# Patient Record
Sex: FEMALE | Race: ASIAN | Employment: OTHER | URBAN - METROPOLITAN AREA
[De-identification: names, ages, dates, MRNs, and addresses within clinical notes are randomized per-mention and may not be internally consistent; named-entity substitution may affect disease eponyms.]

---

## 2017-01-09 DIAGNOSIS — R51.9 PERSISTENT HEADACHES: ICD-10-CM

## 2017-01-18 ENCOUNTER — DOCUMENTATION ONLY (OUTPATIENT)
Dept: FAMILY MEDICINE CLINIC | Age: 67
End: 2017-01-18

## 2017-01-18 ENCOUNTER — OFFICE VISIT (OUTPATIENT)
Dept: FAMILY MEDICINE CLINIC | Age: 67
End: 2017-01-18

## 2017-01-18 VITALS
HEART RATE: 88 BPM | BODY MASS INDEX: 26.88 KG/M2 | WEIGHT: 142.4 LBS | SYSTOLIC BLOOD PRESSURE: 130 MMHG | DIASTOLIC BLOOD PRESSURE: 72 MMHG | HEIGHT: 61 IN | OXYGEN SATURATION: 96 % | TEMPERATURE: 98.3 F

## 2017-01-18 DIAGNOSIS — Z13.1 SCREENING FOR DIABETES MELLITUS: ICD-10-CM

## 2017-01-18 DIAGNOSIS — Z13.6 SCREENING FOR ISCHEMIC HEART DISEASE: ICD-10-CM

## 2017-01-18 DIAGNOSIS — Z23 ENCOUNTER FOR IMMUNIZATION: ICD-10-CM

## 2017-01-18 DIAGNOSIS — Z00.00 ROUTINE GENERAL MEDICAL EXAMINATION AT A HEALTH CARE FACILITY: Primary | ICD-10-CM

## 2017-01-18 DIAGNOSIS — M54.2 NECK PAIN, MUSCULOSKELETAL: ICD-10-CM

## 2017-01-18 DIAGNOSIS — Z13.39 SCREENING FOR ALCOHOLISM: ICD-10-CM

## 2017-01-18 NOTE — PATIENT INSTRUCTIONS
Medicare Part B Preventive Services Limitations Recommendation Scheduled   Bone Mass Measurement  (age 72 & older, biennial) Requires diagnosis related to osteoporosis or estrogen deficiency. Biennial benefit unless patient has history of long-term glucocorticoid tx or baseline is needed because initial test was by other method     Cardiovascular Screening Blood Tests (every 5 years)  Total cholesterol, HDL, Triglycerides Order as a panel if possible     Colorectal Cancer Screening  -Fecal occult blood test (annual)  -Flexible sigmoidoscopy (5y)  -Screening colonoscopy (10y)  -Barium Enema      Counseling to Prevent Tobacco Use (up to 8 sessions per year)  - Counseling greater than 3 and up to 10 minutes  - Counseling greater than 10 minutes Patients must be asymptomatic of tobacco-related conditions to receive as preventive service     Diabetes Screening Tests (at least every 3 years, Medicare covers annually or at 6-month intervals for prediabetic patients)    Fasting blood sugar (FBS) or glucose tolerance test (GTT) Patient must be diagnosed with one of the following:  -Hypertension, Dyslipidemia, obesity, previous impaired FBS or GTT  Or any two of the following: overweight, FH of diabetes, age ? 72, history of gestational diabetes, birth of baby weighing more than 9 pounds     Diabetes Self-Management Training (DSMT) (no USPSTF recommendation) Requires referral by treating physician for patient with diabetes or renal disease. 10 hours of initial DSMT session of no less than 30 minutes each in a continuous 12-month period. 2 hours of follow-up DSMT in subsequent years.      Glaucoma Screening (no USPSTF recommendation) Diabetes mellitus, family history, , age 48 or over,  American, age 72 or over     Human Immunodeficiency Virus (HIV) Screening (annually for increased risk patients)  HIV-1 and HIV-2 by EIA, RAQUEL, rapid antibody test, or oral mucosa transudate Patient must be at increased risk for HIV infection per USPSTF guidelines or pregnant. Tests covered annually for patients at increased risk. Pregnant patients may receive up to 3 test during pregnancy. Medical Nutrition Therapy (MNT) (for diabetes or renal disease not recommended schedule) Requires referral by treating physician for patient with diabetes or renal disease. Can be provided in same year as diabetes self-management training (DSMT), and CMS recommends medical nutrition therapy take place after DSMT. Up to 3 hours for initial year and 2 hours in subsequent years. Shingles Vaccination A shingles vaccine is also recommended once in a lifetime after age 61     Seasonal Influenza Vaccination (annually)      Pneumococcal Vaccination (once after 72)      Hepatitis B Vaccinations (if medium/high risk) Medium/high risk factors:  End-stage renal disease,  Hemophiliacs who received Factor VIII or IX concentrates, Clients of institutions for the mentally retarded, Persons who live in the same house as a HepB virus carrier, Homosexual men, Illicit injectable drug abusers. Screening Mammography (biennial age 54-69) Annually (age 36 or over)     Screening Pap Tests and Pelvic Examination (up to age 79 and after 79 if unknown history or abnormal study last 10 years) Every 25 months except high risk     Ultrasound Screening for Abdominal Aortic Aneurysm (AAA) (once) Patient must be referred through [facility] and not have had a screening for abdominal aortic aneurysm before under Medicare. Limited to patients who meet one of the following criteria:  - Men who are 73-68 years old and have smoked more than 100 cigarettes in their lifetime.  -Anyone with a FH of AAA  -Anyone recommended for screening by USPSTF         Medicare Part B Preventive Services Limitations Recommendation Scheduled   Bone Mass Measurement  (age 72 & older, biennial) Requires diagnosis related to osteoporosis or estrogen deficiency.  Biennial benefit unless patient has history of long-term glucocorticoid tx or baseline is needed because initial test was by other method     Cardiovascular Screening Blood Tests (every 5 years)  Total cholesterol, HDL, Triglycerides Order as a panel if possible     Colorectal Cancer Screening  -Fecal occult blood test (annual)  -Flexible sigmoidoscopy (5y)  -Screening colonoscopy (10y)  -Barium Enema      Counseling to Prevent Tobacco Use (up to 8 sessions per year)  - Counseling greater than 3 and up to 10 minutes  - Counseling greater than 10 minutes Patients must be asymptomatic of tobacco-related conditions to receive as preventive service     Diabetes Screening Tests (at least every 3 years, Medicare covers annually or at 6-month intervals for prediabetic patients)    Fasting blood sugar (FBS) or glucose tolerance test (GTT) Patient must be diagnosed with one of the following:  -Hypertension, Dyslipidemia, obesity, previous impaired FBS or GTT  Or any two of the following: overweight, FH of diabetes, age ? 72, history of gestational diabetes, birth of baby weighing more than 9 pounds     Diabetes Self-Management Training (DSMT) (no USPSTF recommendation) Requires referral by treating physician for patient with diabetes or renal disease. 10 hours of initial DSMT session of no less than 30 minutes each in a continuous 12-month period. 2 hours of follow-up DSMT in subsequent years. Glaucoma Screening (no USPSTF recommendation) Diabetes mellitus, family history, , age 48 or over,  American, age 72 or over     Human Immunodeficiency Virus (HIV) Screening (annually for increased risk patients)  HIV-1 and HIV-2 by EIA, RAQUEL, rapid antibody test, or oral mucosa transudate Patient must be at increased risk for HIV infection per USPSTF guidelines or pregnant. Tests covered annually for patients at increased risk. Pregnant patients may receive up to 3 test during pregnancy.      Medical Nutrition Therapy (MNT) (for diabetes or renal disease not recommended schedule) Requires referral by treating physician for patient with diabetes or renal disease. Can be provided in same year as diabetes self-management training (DSMT), and CMS recommends medical nutrition therapy take place after DSMT. Up to 3 hours for initial year and 2 hours in subsequent years. Shingles Vaccination A shingles vaccine is also recommended once in a lifetime after age 61     Seasonal Influenza Vaccination (annually)      Pneumococcal Vaccination (once after 72)      Hepatitis B Vaccinations (if medium/high risk) Medium/high risk factors:  End-stage renal disease,  Hemophiliacs who received Factor VIII or IX concentrates, Clients of institutions for the mentally retarded, Persons who live in the same house as a HepB virus carrier, Homosexual men, Illicit injectable drug abusers. Screening Mammography (biennial age 54-69) Annually (age 36 or over)     Screening Pap Tests and Pelvic Examination (up to age 79 and after 79 if unknown history or abnormal study last 10 years) Every 25 months except high risk     Ultrasound Screening for Abdominal Aortic Aneurysm (AAA) (once) Patient must be referred through [facility] and not have had a screening for abdominal aortic aneurysm before under Medicare.   Limited to patients who meet one of the following criteria:  - Men who are 73-68 years old and have smoked more than 100 cigarettes in their lifetime.  -Anyone with a FH of AAA  -Anyone recommended for screening by USPSTF

## 2017-01-18 NOTE — MR AVS SNAPSHOT
Visit Information Date & Time Provider Department Dept. Phone Encounter #  
 1/18/2017 10:30 AM Christel Stephen [facility] [phone] [id number] Follow-up Instructions Return in 4 months (on 5/18/2017). Your Appointments 1/25/2017 10:30 AM  
PRE OP with Christel Stephen MD  
[employer] [facility]) Appt Note: [place] surgery procedure 1/31 Dr. Thanh Ding [phone]  
 [address] [facility] [address]  
[phone]  
  
   
 [address] [facility] [address] [facility] Upcoming Health Maintenance Date Due Pneumococcal 65+ Low/Medium Risk (1 of 2 - PCV13) 4/5/2016 BREAST CANCER SCRN MAMMOGRAM 3/10/2017 HEMOGLOBIN A1C Q6M 5/28/2017 FOOT EXAM Q1 8/25/2017 MICROALBUMIN Q1 8/25/2017 LIPID PANEL Q1 8/25/2017 MEDICARE YEARLY EXAM 8/26/2017 EYE EXAM RETINAL OR DILATED Q1 12/7/2017 GLAUCOMA SCREENING Q2Y 12/7/2018 DTaP/Tdap/Td series (2 - Td) 8/11/2020 COLONOSCOPY 12/21/2021 Allergies as of 1/18/2017  Review Complete On: 1/18/2017 By: Gabriel Walton LPN Severity Noted Reaction Type Reactions Codeine  04/01/2016    Nausea and Vomiting Current Immunizations  Reviewed on 1/18/2017 Name Date Influenza High Dose Vaccine PF 8/25/2016 11:56 AM  
 Influenza Vaccine 10/23/2015 Pneumococcal Polysaccharide (PPSV-23) 11/14/2014 Tdap 8/11/2010 Zoster Vaccine, Live 7/20/2011 Reviewed by Christel Stephen MD on 1/18/2017 at 10:46 AM  
You Were Diagnosed With   
  
 Codes Comments Type 2 diabetes mellitus with diabetic polyneuropathy, without long-term current use of insulin (HCC)    -  Primary ICD-10-CM: E11.42 
ICD-9-CM: 250.60, 357.2 Essential hypertension     ICD-10-CM: I10 
ICD-9-CM: 401.9 Pure hypercholesterolemia     ICD-10-CM: E78.00 ICD-9-CM: 272.0 Acquired hypothyroidism     ICD-10-CM: E03.9 ICD-9-CM: 244.9 Neck pain, musculoskeletal     ICD-10-CM: M54.2 ICD-9-CM: 723.1 Routine general medical examination at a health care facility     ICD-10-CM: Z00.00 ICD-9-CM: V70.0 Screening for alcoholism     ICD-10-CM: Z13.89 ICD-9-CM: V79.1 Encounter for immunization     ICD-10-CM: I53 ICD-9-CM: V03.89 Screening for diabetes mellitus     ICD-10-CM: Z13.1 ICD-9-CM: V77.1 Screening for ischemic heart disease     ICD-10-CM: Z13.6 ICD-9-CM: V81.0 Vitals BP Pulse Temp Height(growth percentile) Weight(growth percentile) SpO2  
 130/72 (BP 1 Location: Right arm, BP Patient Position: Sitting) 88 98.3 °F (36.8 °C) (Oral) 5' 1\" (1.549 m) 142 lb 6.4 oz (64.6 kg) 96% BMI OB Status Smoking Status 26.91 kg/m2 Postmenopausal Never Smoker Vitals History BMI and BSA Data Body Mass Index Body Surface Area  
 26.91 kg/m 2 1.67 m 2 Preferred Pharmacy Pharmacy Name Phone [address]  [address] [facility], [facility] Gurwinder Willis Moore [phone] Your Updated Medication List  
  
   
This list is accurate as of: 1/18/17 11:01 AM.  Always use your most recent med list.  
  
  
  
  
 atorvastatin 10 mg tablet Commonly known as:  LIPITOR Take 1 Tab by mouth daily. esomeprazole 20 mg capsule Commonly known as:  Rosalee Benders Take  by mouth daily. fenofibrate 54 mg tablet Commonly known as:  LOFIBRA Take  by mouth daily. fluticasone-salmeterol 100-50 mcg/dose diskus inhaler Commonly known as:  ADVAIR Take 1 Puff by inhalation every twelve (12) hours. gabapentin 100 mg capsule Commonly known as:  NEURONTIN Take 1 Cap by mouth three (3) times daily. hydroCHLOROthiazide 12.5 mg capsule Commonly known as:  Olden Reeders Take 12.5 mg by mouth daily. levothyroxine 50 mcg tablet Commonly known as:  SYNTHROID Take 1 Tab by mouth Daily (before breakfast). lisinopril 40 mg tablet Commonly known as:  Winnie Brianna Take 40 mg by mouth daily. LORazepam 1 mg tablet Commonly known as:  ATIVAN Start with 1/2 tab at night as needed. Can increase to full tab if needed. Indications: ANXIETY, INSOMNIA  
  
 metFORMIN 1,000 mg tablet Commonly known as:  GLUCOPHAGE Take 1 Tab by mouth two (2) times daily (with meals). OCUVITE ADULT 50+ PO Take  by mouth daily. Omega-3-DHA-EPA-Fish Oil 1,000 mg (120 mg-180 mg) Cap Take  by mouth daily. PARoxetine 40 mg tablet Commonly known as:  PAXIL Take 1 Tab by mouth daily. pneumococcal 13 temo conj dip 0.5 mL Syrg injection Commonly known as:  PREVNAR 13 (PF)  
0.5 mL by IntraMUSCular route once for 1 dose. propranolol LA 60 mg SR capsule Commonly known as:  INDERAL LA Take 1 Cap by mouth daily. Indications: MIGRAINE PREVENTION SITagliptin 100 mg tablet Commonly known as:  Shante Chari Take 100 mg by mouth daily. Prescriptions Printed Refills  
 pneumococcal 13 temo conj dip (PREVNAR 13, PF,) 0.5 mL syrg injection 0 Si.5 mL by IntraMUSCular route once for 1 dose. Class: Print Route: IntraMUSCular We Performed the Following REFERRAL TO PHYSICAL THERAPY [XAA90 Custom] Follow-up Instructions Return in 4 months (on 2017). Referral Information Referral ID Referred By Referred To  
  
 [id number] Jacquie SMART Not Available Visits Status Start Date End Date 1 New Request 17 If your referral has a status of pending review or denied, additional information will be sent to support the outcome of this decision. Patient Instructions Medicare Part B Preventive Services Limitations Recommendation Scheduled Bone Mass Measurement 
(age 72 & older, biennial) Requires diagnosis related to osteoporosis or estrogen deficiency.  Biennial benefit unless patient has history of long-term glucocorticoid tx or baseline is needed because initial test was by other method Cardiovascular Screening Blood Tests (every 5 years) Total cholesterol, HDL, Triglycerides Order as a panel if possible Colorectal Cancer Screening 
-Fecal occult blood test (annual) -Flexible sigmoidoscopy (5y) 
-Screening colonoscopy (10y) -Barium Enema Counseling to Prevent Tobacco Use (up to 8 sessions per year) - Counseling greater than 3 and up to 10 minutes - Counseling greater than 10 minutes Patients must be asymptomatic of tobacco-related conditions to receive as preventive service Diabetes Screening Tests (at least every 3 years, Medicare covers annually or at 6-month intervals for prediabetic patients) Fasting blood sugar (FBS) or glucose tolerance test (GTT) Patient must be diagnosed with one of the following: 
-Hypertension, Dyslipidemia, obesity, previous impaired FBS or GTT 
Or any two of the following: overweight, FH of diabetes, age ? 72, history of gestational diabetes, birth of baby weighing more than 9 pounds Diabetes Self-Management Training (DSMT) (no USPSTF recommendation) Requires referral by treating physician for patient with diabetes or renal disease. 10 hours of initial DSMT session of no less than 30 minutes each in a continuous 12-month period. 2 hours of follow-up DSMT in subsequent years. Glaucoma Screening (no USPSTF recommendation) Diabetes mellitus, family history, , age 48 or over,  American, age 72 or over Human Immunodeficiency Virus (HIV) Screening (annually for increased risk patients) HIV-1 and HIV-2 by EIA, RAQUEL, rapid antibody test, or oral mucosa transudate Patient must be at increased risk for HIV infection per USPSTF guidelines or pregnant. Tests covered annually for patients at increased risk. Pregnant patients may receive up to 3 test during pregnancy. Medical Nutrition Therapy (MNT) (for diabetes or renal disease not recommended schedule) Requires referral by treating physician for patient with diabetes or renal disease. Can be provided in same year as diabetes self-management training (DSMT), and CMS recommends medical nutrition therapy take place after DSMT. Up to 3 hours for initial year and 2 hours in subsequent years. Shingles Vaccination A shingles vaccine is also recommended once in a lifetime after age 61 Seasonal Influenza Vaccination (annually) Pneumococcal Vaccination (once after 65) Hepatitis B Vaccinations (if medium/high risk) Medium/high risk factors:  End-stage renal disease, Hemophiliacs who received Factor VIII or IX concentrates, Clients of institutions for the mentally retarded, Persons who live in the same house as a HepB virus carrier, Homosexual men, Illicit injectable drug abusers. Screening Mammography (biennial age 54-69) Annually (age 36 or over) Screening Pap Tests and Pelvic Examination (up to age 79 and after 79 if unknown history or abnormal study last 10 years) Every 24 months except high risk Ultrasound Screening for Abdominal Aortic Aneurysm (AAA) (once) Patient must be referred through IPPE and not have had a screening for abdominal aortic aneurysm before under Medicare. Limited to patients who meet one of the following criteria: 
- Men who are 73-68 years old and have smoked more than 100 cigarettes in their lifetime. 
-Anyone with a FH of AAA 
-Anyone recommended for screening by USPSTF Introducing [facility]! [facility] introduces [employer] patient portal. Now you can access parts of your medical record, email your doctor's office, and request medication refills online. 1. In your internet browser, go to [email or web address] 2. Click on the First Time User? Click Here link in the Sign In box. You will see the New Member Sign Up page. 3. Enter your [employer] Access Code exactly as it appears below. You will not need to use this code after youve completed the sign-up process. If you do not sign up before the expiration date, you must request a new code. · [employer] Access Code: [email or web address] Expires: 2/26/2017 11:14 AM 
 
4. Enter the last four digits of your Social Security Number (xxxx) and Date of Birth (mm/dd/yyyy) as indicated and click Submit. You will be taken to the next sign-up page. 5. Create a Abazab ID. This will be your Abazab login ID and cannot be changed, so think of one that is secure and easy to remember. 6. Create a [employer] password. You can change your password at any time. 7. Enter your Password Reset Question and Answer. This can be used at a later time if you forget your password. 8. Enter your e-mail address. You will receive e-mail notification when new information is available in [address] 9. Click Sign Up. You can now view and download portions of your medical record. 10. Click the Download Summary menu link to download a portable copy of your medical information. If you have questions, please visit the Frequently Asked Questions section of the [employer] website. Remember, [employer]ab is NOT to be used for urgent needs. For medical emergencies, dial 911. Now available from your iPhone and Android! Please provide this summary of care documentation to your next provider. Your primary care clinician is listed as Hector 51. If you have any questions after today's visit, please call [phone].

## 2017-01-18 NOTE — PROGRESS NOTES
Heath Hein is a 73 y.o. female  Chief Complaint   Patient presents with   t Annual Wellness Visit     1. Have you been to the ER, urgent care clinic since your last visit? Hospitalized since your last visit? No    2. Have you seen or consulted any other health care providers outside of the [address] [place] [address] since your last visit? Include any pap smears or colon screening.  No

## 2017-01-19 NOTE — PROGRESS NOTES
This is a \"Welcome to [employer]"  Initial Preventive Physical Examination (IPPE) providing Personalized Prevention Plan Services (Performed in the first 12 months of enrollment)    I have reviewed the patient's medical history in detail and updated the computerized patient record. History     Past Medical History   Diagnosis Date    Acquired hypothyroidism 2016    Asthma 2016    Depression     Diabetes ([address]     Diabetic neuropathy (HCC)      feet    Dizziness     GERD (gastroesophageal reflux disease)     HLD (hyperlipidemia) 2016    Hypercholesterolemia     Hypertension     Sleep disturbance 2016    Thyroid disease       Past Surgical History   Procedure Laterality Date    Hx  section      Hx cholecystectomy      Hx hernia repair  2016    Hx appendectomy      Hx colonoscopy  2016     Repeat colonoscopy in 5 years per Dr. Nando Fan; last 16     Current Outpatient Prescriptions   Medication Sig Dispense Refill    PARoxetine (PAXIL) 40 mg tablet Take 1 Tab by mouth daily. 90 Tab 2    gabapentin (NEURONTIN) 100 mg capsule Take 1 Cap by mouth three (3) times daily. 270 Cap 2    levothyroxine (SYNTHROID) 50 mcg tablet Take 1 Tab by mouth Daily (before breakfast). 90 Tab 2    propranolol LA (INDERAL LA) 60 mg SR capsule Take 1 Cap by mouth daily. Indications: MIGRAINE PREVENTION 90 Cap 1    fluticasone-salmeterol (ADVAIR) 100-50 mcg/dose diskus inhaler Take 1 Puff by inhalation every twelve (12) hours. 3 Inhaler 2    atorvastatin (LIPITOR) 10 mg tablet Take 1 Tab by mouth daily. 90 Tab 0    Omega-3-DHA-EPA-Fish Oil 1,000 mg (120 mg-180 mg) cap Take  by mouth daily.  fenofibrate (LOFIBRA) 54 mg tablet Take  by mouth daily.  esomeprazole (NEXIUM) 20 mg capsule Take  by mouth daily.  lisinopril (PRINIVIL, ZESTRIL) 40 mg tablet Take 40 mg by mouth daily.  hydrochlorothiazide (MICROZIDE) 12.5 mg capsule Take 12.5 mg by mouth daily.  ANTIOX #11/OM3/DHA/EPA/LUT/SHASHANK (OCUVITE ADULT 50+ PO) Take  by mouth daily.  sitaGLIPtin (JANUVIA) 100 mg tablet Take 100 mg by mouth daily.  metFORMIN (GLUCOPHAGE) 1,000 mg tablet Take 1 Tab by mouth two (2) times daily (with meals). 180 Tab 1    LORazepam (ATIVAN) 1 mg tablet Start with 1/2 tab at night as needed. Can increase to full tab if needed. Indications: ANXIETY, INSOMNIA 30 Tab 1     Allergies   Allergen Reactions    Codeine Nausea and Vomiting     Family History   Problem Relation Age of Onset    Diabetes Mother     Heart Disease Mother     Thyroid Disease Sister      Social History   Substance Use Topics    Smoking status: Never Smoker    Smokeless tobacco: Never Used    Alcohol use Yes     Diet, Lifestyle: generally follows a low sodium diet    Exercise level: mildly active    Depression Risk Screen     PHQ 2 / 9, over the last two weeks 4/1/2016   Little interest or pleasure in doing things Several days   Feeling down, depressed or hopeless Several days   Total Score PHQ 2 2     Alcohol Risk Screen   On any occasion during the past 3 months, have you had more than 3 drinks containing alcohol? No    Do you average more than 7 drinks per week? No    Functional Ability and Level of Safety     Hearing Loss   none    Activities of Daily Living   Self-care     Fall Risk Screen     Fall Risk Assessment, last 12 mths 1/18/2017   Able to walk? Yes   Fall in past 12 months? No   Fall with injury? -   Number of falls in past 12 months -   Fall Risk Score -     Abuse Screen   Patient is not abused    Review of Systems   Pertinent items are noted in HPI.     Physical Examination     No exam data present     Visit Vitals    /72 (BP 1 Location: Right arm, BP Patient Position: Sitting)    Pulse 88    Temp 98.3 °F (36.8 °C) (Oral)    Ht 5' 1\" (1.549 m)    Wt 142 lb 6.4 oz (64.6 kg)    SpO2 96%    BMI 26.91 kg/m2     General appearance: alert, cooperative, no distress, appears stated age      Patient Care Team:  Corinna Ramos MD as PCP - General (Internal Medicine)  Kayla Brennan NP (Nurse Practitioner)  Kavita Varela MD (Colon and Rectal Surgery)     End-of-life planning  Advanced Directive discussed and documented: NO: will discuss on next visit. Assessment/Plan   Education and counseling provided:  Are appropriate based on today's review and evaluation    ICD-10-CM ICD-9-CM    1. Routine general medical examination at a health care facility Z00.00 V70.0    2. Neck pain, musculoskeletal M54.2 723.1 REFERRAL TO PHYSICAL THERAPY   3. Screening for alcoholism Z13.89 V79.1    4. Encounter for immunization Z23 V03.89 pneumococcal 13 temo conj dip (PREVNAR 13, PF,) 0.5 mL syrg injection   5. Screening for diabetes mellitus Z13.1 V77.1    6. Screening for ischemic heart disease Z13.6 V81.0    .

## 2017-01-25 ENCOUNTER — OFFICE VISIT (OUTPATIENT)
Dept: FAMILY MEDICINE CLINIC | Age: 67
End: 2017-01-25

## 2017-01-25 ENCOUNTER — TELEPHONE (OUTPATIENT)
Dept: FAMILY MEDICINE CLINIC | Age: 67
End: 2017-01-25

## 2017-01-25 VITALS
SYSTOLIC BLOOD PRESSURE: 132 MMHG | DIASTOLIC BLOOD PRESSURE: 70 MMHG | BODY MASS INDEX: 26.73 KG/M2 | OXYGEN SATURATION: 95 % | HEART RATE: 73 BPM | TEMPERATURE: 98.6 F | RESPIRATION RATE: 20 BRPM | HEIGHT: 61 IN | WEIGHT: 141.6 LBS

## 2017-01-25 DIAGNOSIS — Z01.818 PREOP EXAMINATION: ICD-10-CM

## 2017-01-25 DIAGNOSIS — H53.8 BLURRED VISION: Primary | ICD-10-CM

## 2017-01-25 DIAGNOSIS — I10 ESSENTIAL HYPERTENSION: ICD-10-CM

## 2017-01-25 RX ORDER — LISINOPRIL 40 MG/1
40 TABLET ORAL DAILY
Qty: 90 TAB | Refills: 2 | Status: SHIPPED | OUTPATIENT
Start: 2017-01-25 | End: 2017-03-22 | Stop reason: SDUPTHER

## 2017-01-25 RX ORDER — HYDROCHLOROTHIAZIDE 12.5 MG/1
12.5 CAPSULE ORAL DAILY
Qty: 90 CAP | Refills: 2 | Status: SHIPPED | OUTPATIENT
Start: 2017-01-25 | End: 2017-03-22 | Stop reason: SDUPTHER

## 2017-01-25 NOTE — PROGRESS NOTES
Taylor Patino is a 73 y.o. female  Chief Complaint   Patient presents with    Pre-op Exam     eye surgery     1. Have you been to the ER, urgent care clinic since your last visit? Hospitalized since your last visit? No    2. Have you seen or consulted any other health care providers outside of the [address] [place] [address] since your last visit? Include any pap smears or colon screening.  No

## 2017-01-25 NOTE — MR AVS SNAPSHOT
Visit Information Date & Time Provider Department Dept. Phone Encounter #  
 1/25/2017 10:30 AM Deny Menjivar [facility] [phone] [id number] Your Appointments 3/20/2017  1:00 PM  
Follow Up with Deny Menjivar MD  
[employer] [facility]) Appt Note: 2 month f/u appt [address] [facility] [id number] [phone]  
  
   
 [address] Upcoming Health Maintenance Date Due Pneumococcal 65+ Low/Medium Risk (1 of 2 - PCV13) 4/5/2016 BREAST CANCER SCRN MAMMOGRAM 3/10/2017 HEMOGLOBIN A1C Q6M 5/28/2017 FOOT EXAM Q1 8/25/2017 MICROALBUMIN Q1 8/25/2017 LIPID PANEL Q1 8/25/2017 MEDICARE YEARLY EXAM 8/26/2017 EYE EXAM RETINAL OR DILATED Q1 12/7/2017 GLAUCOMA SCREENING Q2Y 12/7/2018 DTaP/Tdap/Td series (2 - Td) 8/11/2020 COLONOSCOPY 12/21/2021 Allergies as of 1/25/2017  Review Complete On: 1/25/2017 By: Deny Menjivar MD  
  
 Severity Noted Reaction Type Reactions Codeine  04/01/2016    Nausea and Vomiting Current Immunizations  Reviewed on 1/18/2017 Name Date Influenza High Dose Vaccine PF 8/25/2016 11:56 AM  
 Influenza Vaccine 10/23/2015 Pneumococcal Polysaccharide (PPSV-23) 11/14/2014 Tdap 8/11/2010 Zoster Vaccine, Live 7/20/2011 Not reviewed this visit You Were Diagnosed With   
  
 Codes Comments Blurred vision    -  Primary ICD-10-CM: H53.8 ICD-9-CM: 368.8 Preop examination     ICD-10-CM: D37.358 ICD-9-CM: V72.84 Essential hypertension     ICD-10-CM: I10 
ICD-9-CM: 401.9 Vitals BP Pulse Temp Resp Height(growth percentile) Weight(growth percentile) 132/70 (BP 1 Location: Left arm, BP Patient Position: Sitting) 73 98.6 °F (37 °C) (Oral) 20 5' 1\" (1.549 m) 141 lb 9.6 oz (64.2 kg) SpO2 BMI OB Status Smoking Status 95% 26.76 kg/m2 Postmenopausal Never Smoker Vitals History BMI and BSA Data Body Mass Index Body Surface Area  
 26.76 kg/m 2 1.66 m 2 Preferred Pharmacy Pharmacy Name Phone [address], [place] [phone] Your Updated Medication List  
  
   
This list is accurate as of: 1/25/17 11:26 AM.  Always use your most recent med list.  
  
  
  
  
 atorvastatin 10 mg tablet Commonly known as:  LIPITOR Take 1 Tab by mouth daily. esomeprazole 20 mg capsule Commonly known as:  Marrion Favorite Take  by mouth daily. fenofibrate 54 mg tablet Commonly known as:  LOFIBRA Take  by mouth daily. fluticasone-salmeterol 100-50 mcg/dose diskus inhaler Commonly known as:  ADVAIR Take 1 Puff by inhalation every twelve (12) hours. gabapentin 100 mg capsule Commonly known as:  NEURONTIN Take 1 Cap by mouth three (3) times daily. hydroCHLOROthiazide 12.5 mg capsule Commonly known as:  Darnella Ashley Take 1 Cap by mouth daily. levothyroxine 50 mcg tablet Commonly known as:  SYNTHROID Take 1 Tab by mouth Daily (before breakfast). lisinopril 40 mg tablet Commonly known as:  Sarahi Michelle Take 1 Tab by mouth daily. LORazepam 1 mg tablet Commonly known as:  ATIVAN Start with 1/2 tab at night as needed. Can increase to full tab if needed. Indications: ANXIETY, INSOMNIA  
  
 metFORMIN 1,000 mg tablet Commonly known as:  GLUCOPHAGE Take 1 Tab by mouth two (2) times daily (with meals). OCUVITE ADULT 50+ PO Take  by mouth daily. Omega-3-DHA-EPA-Fish Oil 1,000 mg (120 mg-180 mg) Cap Take  by mouth daily. PARoxetine 40 mg tablet Commonly known as:  PAXIL Take 1 Tab by mouth daily. propranolol LA 60 mg SR capsule Commonly known as:  INDERAL LA Take 1 Cap by mouth daily. Indications: MIGRAINE PREVENTION SITagliptin 100 mg tablet Commonly known as:  Gustavo Oats Take 1 Tab by mouth daily. Prescriptions Sent to Pharmacy Refills  
 lisinopril (PRINIVIL, ZESTRIL) 40 mg tablet 2 Sig: Take 1 Tab by mouth daily. Class: Normal  
 Pharmacy: [address], [place] Ph #: [phone] Route: Oral  
 SITagliptin (JANUVIA) 100 mg tablet 2 Sig: Take 1 Tab by mouth daily. Class: Normal  
 Pharmacy: [address], [place] Ph #: [phone] Route: Oral  
 hydroCHLOROthiazide (MICROZIDE) 12.5 mg capsule 2 Sig: Take 1 Cap by mouth daily. Class: Normal  
 Pharmacy: [address], [place] Ph #: [phone] Route: Oral  
  
To-Do List   
 01/26/2017 10:00 AM  
  Appointment with Angelito Kuo, PT at [facility] [address]  ([phone]) Patient Instructions High Blood Pressure: Care Instructions Your Care Instructions If your blood pressure is usually above 140/90, you have high blood pressure, or hypertension. That means the top number is 140 or higher or the bottom number is 90 or higher, or both. Despite what a lot of people think, high blood pressure usually doesn't cause headaches or make you feel dizzy or lightheaded. It usually has no symptoms. But it does increase your risk for heart attack, stroke, and kidney or eye damage. The higher your blood pressure, the more your risk increases. Your doctor will give you a goal for your blood pressure. Your goal will be based on your health and your age. An example of a goal is to keep your blood pressure below 140/90. Lifestyle changes, such as eating healthy and being active, are always important to help lower blood pressure. You might also take medicine to reach your blood pressure goal. 
Follow-up care is a key part of your treatment and safety.  Be sure to make and go to all appointments, and call your doctor if you are having problems. It's also a good idea to know your test results and keep a list of the medicines you take. How can you care for yourself at home? Medical treatment · If you stop taking your medicine, your blood pressure will go back up. You may take one or more types of medicine to lower your blood pressure. Be safe with medicines. Take your medicine exactly as prescribed. Call your doctor if you think you are having a problem with your medicine. · Talk to your doctor before you start taking aspirin every day. Aspirin can help certain people lower their risk of a heart attack or stroke. But taking aspirin isn't right for everyone, because it can cause serious bleeding. · See your doctor regularly. You may need to see the doctor more often at first or until your blood pressure comes down. · If you are taking blood pressure medicine, talk to your doctor before you take decongestants or anti-inflammatory medicine, such as ibuprofen. Some of these medicines can raise blood pressure. · Learn how to check your blood pressure at home. Lifestyle changes · Stay at a healthy weight. This is especially important if you put on weight around the waist. Losing even 10 pounds can help you lower your blood pressure. · If your doctor recommends it, get more exercise. Walking is a good choice. Bit by bit, increase the amount you walk every day. Try for at least 30 minutes on most days of the week. You also may want to swim, bike, or do other activities. · Avoid or limit alcohol. Talk to your doctor about whether you can drink any alcohol. · Try to limit how much sodium you eat to less than 2,300 milligrams (mg) a day. Your doctor may ask you to try to eat less than 1,500 mg a day. · Eat plenty of fruits (such as bananas and oranges), vegetables, legumes, whole grains, and low-fat dairy products. · Lower the amount of saturated fat in your diet. Saturated fat is found in animal products such as milk, cheese, and meat. Limiting these foods may help you lose weight and also lower your risk for heart disease. · Do not smoke. Smoking increases your risk for heart attack and stroke. If you need help quitting, talk to your doctor about stop-smoking programs and medicines. These can increase your chances of quitting for good. When should you call for help? Call 911 anytime you think you may need emergency care. This may mean having symptoms that suggest that your blood pressure is causing a serious heart or blood vessel problem. Your blood pressure may be over 180/110. For example, call 911 if: 
· You have symptoms of a heart attack. These may include: ¨ Chest pain or pressure, or a strange feeling in the chest. 
¨ Sweating. ¨ Shortness of breath. ¨ Nausea or vomiting. ¨ Pain, pressure, or a strange feeling in the back, neck, jaw, or upper belly or in one or both shoulders or arms. ¨ Lightheadedness or sudden weakness. ¨ A fast or irregular heartbeat. · You have symptoms of a stroke. These may include: 
¨ Sudden numbness, tingling, weakness, or loss of movement in your face, arm, or leg, especially on only one side of your body. ¨ Sudden vision changes. ¨ Sudden trouble speaking. ¨ Sudden confusion or trouble understanding simple statements. ¨ Sudden problems with walking or balance. ¨ A sudden, severe headache that is different from past headaches. · You have severe back or belly pain. Do not wait until your blood pressure comes down on its own. Get help right away. Call your doctor now or seek immediate care if: 
· Your blood pressure is much higher than normal (such as 180/110 or higher), but you don't have symptoms. · You think high blood pressure is causing symptoms, such as: ¨ Severe headache. ¨ Blurry vision.  
Watch closely for changes in your health, and be sure to contact your doctor if: 
· Your blood pressure measures 140/90 or higher at least 2 times. That means the top number is 140 or higher or the bottom number is 90 or higher, or both. · You think you may be having side effects from your blood pressure medicine. · Your blood pressure is usually normal, but it goes above normal at least 2 times. Where can you learn more? Go to http://bertha-shayla.info/. Enter O335 in the search box to learn more about \"High Blood Pressure: Care Instructions. \" Current as of: August 8, 2016 Content Version: 11.1 © 3764-5938 [employer]. Care instructions adapted under license by [employer] (which disclaims liability or warranty for this information). If you have questions about a medical condition or this instruction, always ask your healthcare professional. [address] any warranty or liability for your use of this information. Introducing [facility]! Kate Barlow introduces [employer] patient portal. Now you can access parts of your medical record, email your doctor's office, and request medication refills online. 1. In your internet browser, go to [email or web address] 2. Click on the First Time User? Click Here link in the Sign In box. You will see the New Member Sign Up page. 3. Enter your [employer] Access Code exactly as it appears below. You will not need to use this code after youve completed the sign-up process. If you do not sign up before the expiration date, you must request a new code. · [employer] Access Code: [email or web address] Expires: 2/26/2017 11:14 AM 
 
4. Enter the last four digits of your Social Security Number (xxxx) and Date of Birth (mm/dd/yyyy) as indicated and click Submit. You will be taken to the next sign-up page. 5. Create a [employer] ID. This will be your [employer] login ID and cannot be changed, so think of one that is secure and easy to remember. 6. Create a [employer] password. You can change your password at any time. 7. Enter your Password Reset Question and Answer. This can be used at a later time if you forget your password. 8. Enter your e-mail address. You will receive e-mail notification when new information is available in [address] 9. Click Sign Up. You can now view and download portions of your medical record. 10. Click the Download Summary menu link to download a portable copy of your medical information. If you have questions, please visit the Frequently Asked Questions section of the [employer] website. Remember, [employer] is NOT to be used for urgent needs. For medical emergencies, dial 911. Now available from your iPhone and Android! Please provide this summary of care documentation to your next provider. Your primary care clinician is listed as H[address]. If you have any questions after today's visit, please call [phone].

## 2017-01-25 NOTE — PROGRESS NOTES
Preoperative Evaluation    Date of Exam: 2017    Mine Meyers is a 73 y.o. female (:1950) who presents for preoperative evaluation. Pt will be having RT eye cataract extraction with IOL placement, with Dr. Ildefonso Falk on 17. HTN: well controlled. Latex Allergy: no    Problem List:     Patient Active Problem List    Diagnosis Date Noted    Sleep disturbance 2016    HLD (hyperlipidemia) 2016    Acquired hypothyroidism 2016    Dizziness 2016    Asthma 2016    Hypertension     Diabetes ([address]     GERD (gastroesophageal reflux disease)     Depression      Allergies: Allergies   Allergen Reactions    Codeine Nausea and Vomiting      Medications:     Current Outpatient Prescriptions   Medication Sig    lisinopril (PRINIVIL, ZESTRIL) 40 mg tablet Take 1 Tab by mouth daily.  SITagliptin (JANUVIA) 100 mg tablet Take 1 Tab by mouth daily.  hydroCHLOROthiazide (MICROZIDE) 12.5 mg capsule Take 1 Cap by mouth daily.  LORazepam (ATIVAN) 1 mg tablet Start with 1/2 tab at night as needed. Can increase to full tab if needed. Indications: ANXIETY, INSOMNIA    PARoxetine (PAXIL) 40 mg tablet Take 1 Tab by mouth daily.  gabapentin (NEURONTIN) 100 mg capsule Take 1 Cap by mouth three (3) times daily.  levothyroxine (SYNTHROID) 50 mcg tablet Take 1 Tab by mouth Daily (before breakfast).  propranolol LA (INDERAL LA) 60 mg SR capsule Take 1 Cap by mouth daily. Indications: MIGRAINE PREVENTION    fluticasone-salmeterol (ADVAIR) 100-50 mcg/dose diskus inhaler Take 1 Puff by inhalation every twelve (12) hours.  atorvastatin (LIPITOR) 10 mg tablet Take 1 Tab by mouth daily.  Omega-3-DHA-EPA-Fish Oil 1,000 mg (120 mg-180 mg) cap Take  by mouth daily.  fenofibrate (LOFIBRA) 54 mg tablet Take  by mouth daily.  esomeprazole (NEXIUM) 20 mg capsule Take  by mouth daily.     ANTIOX #11/OM3/DHA/EPA/LUT/SHASHANK (OCUVITE ADULT 50+ PO) Take  by mouth daily.  metFORMIN (GLUCOPHAGE) 1,000 mg tablet Take 1 Tab by mouth two (2) times daily (with meals). No current facility-administered medications for this visit. Surgical History:     Past Surgical History   Procedure Laterality Date    Hx  section      Hx cholecystectomy      Hx hernia repair  2016    Hx appendectomy      Hx colonoscopy  2016     Repeat colonoscopy in 5 years per Dr. Michael Moreno; last 16     Social History:     Social History     Social History    Marital status:      Spouse name: N/A    Number of children: N/A    Years of education: N/A     Social History Main Topics    Smoking status: Never Smoker    Smokeless tobacco: Never Used    Alcohol use Yes    Drug use: No    Sexual activity: No     Other Topics Concern    None     Social History Narrative       Anesthesia Complications: None  History of abnormal bleeding : None  History of Blood Transfusions: no      Objective:     Review of Systems - Negative       Physical Examination: General appearance - alert, well appearing, and in no distress  Chest - clear to auscultation, no wheezes, rales or rhonchi, symmetric air entry  Heart - normal rate and regular rhythm  Abdomen - soft, nontender, nondistended, no masses or organomegaly  Extremities - peripheral pulses normal, no pedal edema, no clubbing or cyanosis      Wilver Daily was seen today for pre-op exam.    Diagnoses and all orders for this visit:    Blurred vision    Preop examination    Essential hypertension    Other orders  -     lisinopril (PRINIVIL, ZESTRIL) 40 mg tablet; Take 1 Tab by mouth daily.  -     SITagliptin (JANUVIA) 100 mg tablet; Take 1 Tab by mouth daily. -     hydroCHLOROthiazide (MICROZIDE) 12.5 mg capsule; Take 1 Cap by mouth daily. IMPRESSION:   Low risk for planned surgery  No contraindications to planned surgery.     Evonne Hendricks MD   [address], [address] [facility]  [phone] (office #)  [phone] (fax #)  1/25/2017

## 2017-01-25 NOTE — PATIENT INSTRUCTIONS

## 2017-01-26 ENCOUNTER — HOSPITAL ENCOUNTER (OUTPATIENT)
Dept: PHYSICAL THERAPY | Age: 67
Discharge: HOME | End: 2017-01-26
Payer: MEDICARE

## 2017-01-26 PROCEDURE — G8982 BODY POS GOAL STATUS: HCPCS

## 2017-01-26 PROCEDURE — 97162 PT EVAL MOD COMPLEX 30 MIN: CPT

## 2017-01-26 PROCEDURE — G8983 BODY POS D/C STATUS: HCPCS

## 2017-01-26 PROCEDURE — G8981 BODY POS CURRENT STATUS: HCPCS

## 2017-01-26 PROCEDURE — 97110 THERAPEUTIC EXERCISES: CPT

## 2017-01-26 NOTE — PROGRESS NOTES
[address]  [facility] PHYSICAL THERAPY  [address], [address], [place], [address] - Phone: [phone]  Fax: [phone] / [address]  Patient Name: Casper Wan : 1950   Medical   Diagnosis: Cervical Spine Pain Treatment Diagnosis: Neck pain [M54.2]   Onset Date: 3 Weeks ago     Referral Source: Ailssa Paniagua MD [facility]): 2017   Prior Hospitalization: See medical history Provider #: [id number]   Prior Level of Function: No difficulty with lying on her back, turning her head, looking up/down   Comorbidities: Depression, Type II Diabetes, Arthritis, Hypothyroidism, HTN, Asthma   Medications: Verified on Patient Summary List   The Plan of Care and following information is based on the information from the initial evaluation.   ===========================================================================================  Assessment / key information:  Patient is a 73year old female presenting to therapy with signs and symptoms consistent with cervical spine pain. Patient reports her symptoms began insidiously about 3 weeks ago and have been persistent ever since. Patient also notes feeling dizzy on occasion, particularly when standing after sitting for awhile. Patient denies any trauma to the head or neck over the past year and reports she did have an MRI of her brain but is unable to recall the results. Patient has been utilizing ice to help with neck pain and HA's. Patient reports increased difficulty with turning her head, looking up/down and lying on her back. Patient notes symptoms feel better with ice and rest. Patient rates pain at worst 8/10 and 6/10 at best.   Objective Data: Inspection-Poor seated posture, forward head, rounded shoulders. Cervical Spine AROM: flex 51, ext 31 (P!), R rot 50, L rot 25 (P!), R SB 18 (P!), L Sb 20 (P!).  B shoulder AROM grossly limited into flexion and abduction. Strength Testing 4/5 gross strength for B shoulder strength testing. Increased dizziness reported with horizontal and vertical tracking, no symptoms with horizontal or vertical VOR. (+) Cervical compression test, (+) response to manual cervical traction. Increased tenderness and muscular tone to B cervical paraspinals, suboccipitals and UT/LS. Poor cervical spine PIVM to all planes. FOTO: 32/100. Patient educated on diagnosis, prognosis, POC and HEP. Patient issued copy of HEP and denied additional questions. Patient will benefit from skilled PT in order to address these impairments and functional limitations.   ===========================================================================================  Eval Complexity: History HIGH Complexity :3+ comorbidities / personal factors will impact the outcome/ POC ;  Examination  HIGH Complexity : 4+ Standardized tests and measures addressing body structure, function, activity limitation and / or participation in recreation ; Presentation MEDIUM Complexity : Evolving with changing characteristics ; Decision Making MEDIUM Complexity : FOTO score of 26-74; Overall Complexity MEDIUM  Problem List: pain affecting function, decrease ROM, decrease strength, decrease ADL/ functional abilitiies, decrease activity tolerance and decrease flexibility/ joint mobility   Treatment Plan may include any combination of the following: Therapeutic exercise, Therapeutic activities, Neuromuscular re-education, Physical agent/modality, Manual therapy, Patient education and Functional mobility training  Patient / Family readiness to learn indicated by: asking questions, trying to perform skills and interest  Persons(s) to be included in education: patient (P)  Barriers to Learning/Limitations: no  Measures taken:    Patient Goal (s): Move neck without pain, reduce dizziness. Patient self reported health status: fair  Rehabilitation Potential: good   Short Term Goals:  To be accomplished in  3  weeks:  1. Patient will demonstrate independence with HEP for self management of symptoms. 2. Patient will report reduction in pain at worst to 4-5/10 in order to improve tolerance to lying supine.  Long Term Goals: To be accomplished in  6  weeks:  1. Patient will improve FOTO to >/= 55/100 in order to improve quality of life. 2. Patient will report reduction in pain at worst to 1-2/10 in order to improve tolerance to head turns. 3.patient will improve cervical spine AROM by 10-15 degrees in areas of deficit in order to improve ADL function. Frequency / Duration:   Patient to be seen  2  times per week for 6  weeks:  Patient / Caregiver education and instruction: self care, activity modification and exercises  G-Codes (GP): [id number] Current  CL= 60-79%   Goal  CK= 40-59%. The severity rating is based on the FOTO Score  Therapist Signature: Cara Smallwood PT Date: 7/68/8268   Certification Period: 1/26/17-4/20/17 Time: 11:03 AM   ===========================================================================================  I certify that the above Physical Therapy Services are being furnished while the patient is under my care. I agree with the treatment plan and certify that this therapy is necessary. Physician Signature:        Date:       Time:     Please sign and return to [facility] or you may fax the signed copy to [phone]. Thank you.

## 2017-01-31 ENCOUNTER — APPOINTMENT (OUTPATIENT)
Dept: PHYSICAL THERAPY | Age: 67
End: 2017-01-31
Payer: MEDICARE

## 2017-02-02 ENCOUNTER — APPOINTMENT (OUTPATIENT)
Dept: PHYSICAL THERAPY | Age: 67
End: 2017-02-02

## 2017-02-09 NOTE — PROGRESS NOTES
DOS 01/18/17, based on documentation should of been billed as a . But was billed as [phone], have corrected code to match documentation. Reviewed to make sure the code could be billed.

## 2017-02-13 ENCOUNTER — APPOINTMENT (OUTPATIENT)
Dept: PHYSICAL THERAPY | Age: 67
End: 2017-02-13

## 2017-02-16 ENCOUNTER — APPOINTMENT (OUTPATIENT)
Dept: PHYSICAL THERAPY | Age: 67
End: 2017-02-16

## 2017-02-17 DIAGNOSIS — F32.89 OTHER DEPRESSION: ICD-10-CM

## 2017-02-17 RX ORDER — LISINOPRIL 40 MG/1
40 TABLET ORAL DAILY
Qty: 90 TAB | Refills: 2 | Status: CANCELLED | OUTPATIENT
Start: 2017-02-17

## 2017-02-17 RX ORDER — HYDROCHLOROTHIAZIDE 12.5 MG/1
12.5 CAPSULE ORAL DAILY
Qty: 90 CAP | Refills: 2 | Status: CANCELLED | OUTPATIENT
Start: 2017-02-17

## 2017-02-17 RX ORDER — ATORVASTATIN CALCIUM 10 MG/1
10 TABLET, FILM COATED ORAL DAILY
Qty: 90 TAB | Refills: 0 | Status: CANCELLED | OUTPATIENT
Start: 2017-02-17

## 2017-02-17 RX ORDER — FENOFIBRATE 54 MG/1
TABLET ORAL DAILY
Status: CANCELLED | OUTPATIENT
Start: 2017-02-17

## 2017-02-17 RX ORDER — PAROXETINE HYDROCHLORIDE 40 MG/1
40 TABLET, FILM COATED ORAL DAILY
Qty: 90 TAB | Refills: 2 | Status: CANCELLED | OUTPATIENT
Start: 2017-02-17

## 2017-02-17 NOTE — TELEPHONE ENCOUNTER
Pt put in a call for refills on all her meds. But, she should have refills on all.  She needs to check with the base first because all these were ordered between Nov and Jan.

## 2017-02-21 ENCOUNTER — APPOINTMENT (OUTPATIENT)
Dept: PHYSICAL THERAPY | Age: 67
End: 2017-02-21

## 2017-02-23 ENCOUNTER — APPOINTMENT (OUTPATIENT)
Dept: PHYSICAL THERAPY | Age: 67
End: 2017-02-23

## 2017-02-24 NOTE — PROGRESS NOTES
[address]  [facility] PHYSICAL THERAPY  [address] , [facility], [address] [place] [address] - Phone: [phone]  Fax: [phone] FOR PHYSICAL THERAPY          Patient Name: Kemi Chávez : 1950   Treatment/Medical Diagnosis: Neck pain [M54.2]   Onset Date: 2017    Referral Source: Jf Duarte MD [facility]): 17   Prior Hospitalization: See Medical History Provider #: [id number]   Prior Level of Function: No difficulty with lying on her back, turning her head, looking up/down   Comorbidities: Depression, Type II Diabetes, Arthritis, Hypothyroidism, HTN, Asthma   Medications: Verified on Patient Summary List   Visits from [place] campus: 1 Missed Visits: 2   Key Functional Changes/Progress: Patient contacted our office on 17 stating she would like to be discharged as she recently underwent eye surgery and will be heading out of town for vacation. At this time, patient will be discharged from PT, thank you for this referral.     G-Codes (GP): [id number] Goal  CK= 40-59%   D/C  CL= 60-79%. The severity rating is based on the FOTO Score  Assessments/Recommendations: Discontinue therapy due to lack of attendance or compliance. If you have any questions/comments please contact us directly at [phone]. Thank you for allowing us to assist in the care of your patient. Therapist Signature:  Pola Zurita PT Date: 17   Reporting Period: 17-17 Time: 9:21 AM

## 2017-03-10 DIAGNOSIS — F32.89 OTHER DEPRESSION: ICD-10-CM

## 2017-03-10 RX ORDER — PAROXETINE HYDROCHLORIDE 40 MG/1
40 TABLET, FILM COATED ORAL DAILY
Qty: 90 TAB | Refills: 2 | Status: SHIPPED | OUTPATIENT
Start: 2017-03-10 | End: 2018-01-16 | Stop reason: SDUPTHER

## 2017-03-22 ENCOUNTER — OFFICE VISIT (OUTPATIENT)
Dept: FAMILY MEDICINE CLINIC | Age: 67
End: 2017-03-22

## 2017-03-22 VITALS
TEMPERATURE: 97.2 F | RESPIRATION RATE: 20 BRPM | HEIGHT: 61 IN | HEART RATE: 63 BPM | BODY MASS INDEX: 26.36 KG/M2 | WEIGHT: 139.6 LBS | SYSTOLIC BLOOD PRESSURE: 128 MMHG | OXYGEN SATURATION: 95 % | DIASTOLIC BLOOD PRESSURE: 58 MMHG

## 2017-03-22 DIAGNOSIS — E11.42 TYPE 2 DIABETES MELLITUS WITH DIABETIC POLYNEUROPATHY, WITHOUT LONG-TERM CURRENT USE OF INSULIN (HCC): Primary | ICD-10-CM

## 2017-03-22 DIAGNOSIS — R21 RASH: ICD-10-CM

## 2017-03-22 DIAGNOSIS — E78.00 PURE HYPERCHOLESTEROLEMIA: ICD-10-CM

## 2017-03-22 DIAGNOSIS — I10 ESSENTIAL HYPERTENSION: ICD-10-CM

## 2017-03-22 LAB — HBA1C MFR BLD HPLC: 6.3 %

## 2017-03-22 RX ORDER — TRIAMCINOLONE ACETONIDE 0.25 MG/G
CREAM TOPICAL 2 TIMES DAILY
Qty: 15 G | Refills: 0 | Status: SHIPPED | OUTPATIENT
Start: 2017-03-22 | End: 2017-06-06 | Stop reason: ALTCHOICE

## 2017-03-22 RX ORDER — FENOFIBRATE 54 MG/1
54 TABLET ORAL DAILY
Qty: 90 TAB | Refills: 2 | Status: SHIPPED | OUTPATIENT
Start: 2017-03-22 | End: 2017-12-08 | Stop reason: SDUPTHER

## 2017-03-22 RX ORDER — HYDROCHLOROTHIAZIDE 12.5 MG/1
12.5 CAPSULE ORAL DAILY
Qty: 90 CAP | Refills: 2 | Status: SHIPPED | OUTPATIENT
Start: 2017-03-22 | End: 2017-06-26 | Stop reason: ALTCHOICE

## 2017-03-22 RX ORDER — LISINOPRIL 40 MG/1
40 TABLET ORAL DAILY
Qty: 90 TAB | Refills: 2 | Status: SHIPPED | OUTPATIENT
Start: 2017-03-22 | End: 2017-10-23 | Stop reason: SDUPTHER

## 2017-03-22 RX ORDER — ATORVASTATIN CALCIUM 10 MG/1
10 TABLET, FILM COATED ORAL DAILY
Qty: 90 TAB | Refills: 2 | Status: SHIPPED | OUTPATIENT
Start: 2017-03-22 | End: 2018-01-16 | Stop reason: SDUPTHER

## 2017-03-22 NOTE — PATIENT INSTRUCTIONS

## 2017-03-22 NOTE — MR AVS SNAPSHOT
Visit Information Date & Time Provider Department Dept. Phone Encounter #  
 3/22/2017 10:00 AM Enis Litten, [facility] [phone] [id number] Upcoming Health Maintenance Date Due Pneumococcal 65+ Low/Medium Risk (1 of 2 - PCV13) 4/5/2016 BREAST CANCER SCRN MAMMOGRAM 3/10/2017 HEMOGLOBIN A1C Q6M 5/28/2017 FOOT EXAM Q1 8/25/2017 MICROALBUMIN Q1 8/25/2017 LIPID PANEL Q1 8/25/2017 EYE EXAM RETINAL OR DILATED Q1 12/7/2017 MEDICARE YEARLY EXAM 1/19/2018 GLAUCOMA SCREENING Q2Y 12/7/2018 DTaP/Tdap/Td series (2 - Td) 8/11/2020 COLONOSCOPY 12/21/2021 Allergies as of 3/22/2017  Review Complete On: 3/22/2017 By: Enis Litten, MD  
  
 Severity Noted Reaction Type Reactions Codeine  04/01/2016    Nausea and Vomiting Current Immunizations  Reviewed on 1/18/2017 Name Date Influenza High Dose Vaccine PF 8/25/2016 11:56 AM  
 Influenza Vaccine 10/23/2015 Pneumococcal Polysaccharide (PPSV-23) 11/14/2014 Tdap 8/11/2010 Zoster Vaccine, Live 7/20/2011 Not reviewed this visit You Were Diagnosed With   
  
 Codes Comments Type 2 diabetes mellitus with diabetic polyneuropathy, without long-term current use of insulin (HCC)    -  Primary ICD-10-CM: E11.42 
ICD-9-CM: 250.60, 357.2 Essential hypertension     ICD-10-CM: I10 
ICD-9-CM: 401.9 Pure hypercholesterolemia     ICD-10-CM: E78.00 ICD-9-CM: 272.0 Acquired hypothyroidism     ICD-10-CM: E03.9 ICD-9-CM: 244.9 Rash     ICD-10-CM: R21 
ICD-9-CM: 782.1 Vitals BP Pulse Temp Resp Height(growth percentile) Weight(growth percentile) 128/58 (BP 1 Location: Left arm, BP Patient Position: Sitting) 63 97.2 °F (36.2 °C) 20 5' 1\" (1.549 m) 139 lb 9.6 oz (63.3 kg) SpO2 BMI OB Status Smoking Status 95% 26.38 kg/m2 Postmenopausal Never Smoker Vitals History BMI and BSA Data Body Mass Index Body Surface Area 26.38 kg/m 2 1.65 m 2 Preferred Pharmacy Pharmacy Name Phone [address], [place] [phone] Your Updated Medication List  
  
   
This list is accurate as of: 3/22/17 11:06 AM.  Always use your most recent med list.  
  
  
  
  
 atorvastatin 10 mg tablet Commonly known as:  LIPITOR Take 1 Tab by mouth daily. esomeprazole 20 mg capsule Commonly known as:  Miracle Dame Take  by mouth daily. fenofibrate 54 mg tablet Commonly known as:  LOFIBRA Take 1 Tab by mouth daily. fluticasone-salmeterol 100-50 mcg/dose diskus inhaler Commonly known as:  ADVAIR Take 1 Puff by inhalation every twelve (12) hours. gabapentin 100 mg capsule Commonly known as:  NEURONTIN Take 1 Cap by mouth three (3) times daily. hydroCHLOROthiazide 12.5 mg capsule Commonly known as:  Ashleigh Bue Take 1 Cap by mouth daily. levothyroxine 50 mcg tablet Commonly known as:  SYNTHROID Take 1 Tab by mouth Daily (before breakfast). lisinopril 40 mg tablet Commonly known as:  Mollie Ripa Take 1 Tab by mouth daily. LORazepam 1 mg tablet Commonly known as:  ATIVAN Start with 1/2 tab at night as needed. Can increase to full tab if needed. Indications: ANXIETY, INSOMNIA  
  
 metFORMIN 1,000 mg tablet Commonly known as:  GLUCOPHAGE Take 1 Tab by mouth two (2) times daily (with meals). OCUVITE ADULT 50+ PO Take  by mouth daily. Omega-3-DHA-EPA-Fish Oil 1,000 mg (120 mg-180 mg) Cap Take  by mouth daily. PARoxetine 40 mg tablet Commonly known as:  PAXIL Take 1 Tab by mouth daily. propranolol LA 60 mg SR capsule Commonly known as:  INDERAL LA Take 1 Cap by mouth daily. Indications: MIGRAINE PREVENTION SITagliptin 100 mg tablet Commonly known as:  Arthea Legato Take 1 Tab by mouth daily.   
  
 triamcinolone acetonide 0.025 % topical cream  
 Commonly known as:  KENALOG Apply  to affected area two (2) times a day. use thin layer Prescriptions Sent to Pharmacy Refills  
 triamcinolone acetonide (KENALOG) 0.025 % topical cream 0 Sig: Apply  to affected area two (2) times a day. use thin layer Class: Normal  
 Pharmacy: [address] [place] [address] [address], [facility] [place] [facility] Ph #: [phone] Route: Topical  
 hydroCHLOROthiazide (MICROZIDE) 12.5 mg capsule 2 Sig: Take 1 Cap by mouth daily. Class: Normal  
 Pharmacy: [address] [place] [address] [address], [facility] Ph #: [phone] Route: Oral  
 atorvastatin (LIPITOR) 10 mg tablet 2 Sig: Take 1 Tab by mouth daily. Class: Normal  
 Pharmacy: [address] [place] [address] [address], [facility] Ph #: [phone] Route: Oral  
 fenofibrate (LOFIBRA) 54 mg tablet 2 Sig: Take 1 Tab by mouth daily. Class: Normal  
 Pharmacy: [address] [place] [address] [address], [place] Ph #: [phone] Route: Oral  
 lisinopril (PRINIVIL, ZESTRIL) 40 mg tablet 2 Sig: Take 1 Tab by mouth daily. Class: Normal  
 Pharmacy: [address] [place] [address] [address], [facility] Ph #: [phone] Route: Oral  
  
We Performed the Following AMB POC HEMOGLOBIN A1C [68161 CPT(R)] Patient Instructions Learning About Diabetes Food Guidelines Your Care Instructions Meal planning is important to manage diabetes. It helps keep your blood sugar at a target level (which you set with your doctor). You don't have to eat special foods. You can eat what your family eats, including sweets once in a while. But you do have to pay attention to how often you eat and how much you eat of certain foods.  
You may want to work with a dietitian or a certified diabetes educator (CDE) to help you plan meals and snacks. A dietitian or CDE can also help you lose weight if that is one of your goals. What should you know about eating carbs? Managing the amount of carbohydrate (carbs) you eat is an important part of healthy meals when you have diabetes. Carbohydrate is found in many foods. · Learn which foods have carbs. And learn the amounts of carbs in different foods. ¨ Bread, cereal, pasta, and rice have about 15 grams of carbs in a serving. A serving is 1 slice of bread (1 ounce), ½ cup of cooked cereal, or 1/3 cup of cooked pasta or rice. ¨ Fruits have 15 grams of carbs in a serving. A serving is 1 small fresh fruit, such as an apple or orange; ½ of a banana; ½ cup of cooked or canned fruit; ½ cup of fruit juice; 1 cup of melon or raspberries; or 2 tablespoons of dried fruit. ¨ Milk and no-sugar-added yogurt have 15 grams of carbs in a serving. A serving is 1 cup of milk or 2/3 cup of no-sugar-added yogurt. ¨ Starchy vegetables have 15 grams of carbs in a serving. A serving is ½ cup of mashed potatoes or sweet potato; 1 cup winter squash; ½ of a small baked potato; ½ cup of cooked beans; or ½ cup cooked corn or green peas. · Learn how much carbs to eat each day and at each meal. A dietitian or CDE can teach you how to keep track of the amount of carbs you eat. This is called carbohydrate counting. · If you are not sure how to count carbohydrate grams, use the Plate Method to plan meals. It is a good, quick way to make sure that you have a balanced meal. It also helps you spread carbs throughout the day. ¨ Divide your plate by types of foods. Put non-starchy vegetables on half the plate, meat or other protein food on one-quarter of the plate, and a grain or starchy vegetable in the final quarter of the plate.  To this you can add a small piece of fruit and 1 cup of milk or yogurt, depending on how many carbs you are supposed to eat at a meal. 
 · Try to eat about the same amount of carbs at each meal. Do not \"save up\" your daily allowance of carbs to eat at one meal. 
· Proteins have very little or no carbs per serving. Examples of proteins are beef, chicken, turkey, fish, eggs, tofu, cheese, cottage cheese, and peanut butter. A serving size of meat is 3 ounces, which is about the size of a deck of cards. Examples of meat substitute serving sizes (equal to 1 ounce of meat) are 1/4 cup of cottage cheese, 1 egg, 1 tablespoon of peanut butter, and ½ cup of tofu. How can you eat out and still eat healthy? · Learn to estimate the serving sizes of foods that have carbohydrate. If you measure food at home, it will be easier to estimate the amount in a serving of restaurant food. · If the meal you order has too much carbohydrate (such as potatoes, corn, or baked beans), ask to have a low-carbohydrate food instead. Ask for a salad or green vegetables. · If you use insulin, check your blood sugar before and after eating out to help you plan how much to eat in the future. · If you eat more carbohydrate at a meal than you had planned, take a walk or do other exercise. This will help lower your blood sugar. What else should you know? · Limit saturated fat, such as the fat from meat and dairy products. This is a healthy choice because people who have diabetes are at higher risk of heart disease. So choose lean cuts of meat and nonfat or low-fat dairy products. Use olive or canola oil instead of butter or shortening when cooking. · Don't skip meals. Your blood sugar may drop too low if you skip meals and take insulin or certain medicines for diabetes. · Check with your doctor before you drink alcohol. Alcohol can cause your blood sugar to drop too low. Alcohol can also cause a bad reaction if you take certain diabetes medicines. Follow-up care is a key part of your treatment and safety.  Be sure to make and go to all appointments, and call your doctor if you are having problems. It's also a good idea to know your test results and keep a list of the medicines you take. Where can you learn more? Go to http://bertha-shayla.info/. Enter A489 in the search box to learn more about \"Learning About Diabetes Food Guidelines. \" Current as of: May 23, 2016 Content Version: 11.1 © 4693-5095 [employer]. Care instructions adapted under license by [employer] (which disclaims liability or warranty for this information). If you have questions about a medical condition or this instruction, always ask your healthcare professional. [address] any warranty or liability for your use of this information. Introducing [facility]! Carl Echavarria introduces [employer] patient portal. Now you can access parts of your medical record, email your doctor's office, and request medication refills online. 1. In your internet browser, go to [email or web address] 2. Click on the First Time User? Click Here link in the Sign In box. You will see the New Member Sign Up page. 3. Enter your [employer] Access Code exactly as it appears below. You will not need to use this code after youve completed the sign-up process. If you do not sign up before the expiration date, you must request a new code. · [employer] Access Code: [email or web address] Expires: 6/18/2017 12:09 PM 
 
4. Enter the last four digits of your Social Security Number (xxxx) and Date of Birth (mm/dd/yyyy) as indicated and click Submit. You will be taken to the next sign-up page. 5. Create a [employer] ID. This will be your [employer] login ID and cannot be changed, so think of one that is secure and easy to remember. 6. Create a [employer] password. You can change your password at any time. 7. Enter your Password Reset Question and Answer.  This can be used at a later time if you forget your password. 8. Enter your e-mail address. You will receive e-mail notification when new information is available in [address] 9. Click Sign Up. You can now view and download portions of your medical record. 10. Click the Download Summary menu link to download a portable copy of your medical information. If you have questions, please visit the Frequently Asked Questions section of the [employer] website. Remember, [employer] is NOT to be used for urgent needs. For medical emergencies, dial 911. Now available from your iPhone and Android! Please provide this summary of care documentation to your next provider. Your primary care clinician is listed as Hector [address]. If you have any questions after today's visit, please call [phone].

## 2017-03-22 NOTE — PROGRESS NOTES
Assessment/Plan:    Live Solorzano was seen today for hypertension. Diagnoses and all orders for this visit:    Type 2 diabetes mellitus with diabetic polyneuropathy, without long-term current use of insulin ([facility])  -     AMB POC HEMOGLOBIN A1C    Essential hypertension  -     hydroCHLOROthiazide (MICROZIDE) 12.5 mg capsule; Take 1 Cap by mouth daily. -     lisinopril (PRINIVIL, ZESTRIL) 40 mg tablet; Take 1 Tab by mouth daily. Pure hypercholesterolemia  -     atorvastatin (LIPITOR) 10 mg tablet; Take 1 Tab by mouth daily. -     fenofibrate (LOFIBRA) 54 mg tablet; Take 1 Tab by mouth daily. Rash  -     triamcinolone acetonide (KENALOG) 0.025 % topical cream; Apply  to affected area two (2) times a day. use thin layer      Will cont meds the same. F/u 3 months. Just A1c. The plan was discussed with the patient. The patient verbalized understanding and is in agreement with the plan. All medication potential side effects were discussed with the patient.    -------------------------------------------------------------------------------------------------------------------        Rigo Velazco is a 73 y.o. female and presents with Hypertension (3 months follow up)         Subjective:  DM: well controlled, stable. A1c in office 6.3%. HTN: well controlled. HLD: at goal on last BW. Has a new spotty rash, just 4 spots, spread out over the body. Has been 1 week, very itchy. ROS:  Constitutional: No recent weight change. No weakness/fatigue. No f/c. Skin: No rashes, change in nails/hair, itching   HENT: No HA, dizziness. No hearing loss/tinnitus. No nasal congestion/discharge. Eyes: No change in vision, double/blurred vision or eye pain/redness. Cardiovascular: No CP/palpitations. No CRABTREE/orthopnea/PND. Respiratory: No cough/sputum, dyspnea, wheezing. Gastointestinal: No dysphagia, reflux. No n/v. No constipation/diarrhea. No melena/rectal bleeding.    Genitourinary: No dysuria, urinary hesitancy, nocturia, hematuria. No incontinence. Musculoskeletal: No joint pain/stiffness. No muscle pain/tenderness. Endo: No heat/cold intolerance, no polyuria/polydypsia. Heme: No h/o anemia. No easy bleeding/bruising. Allergy/Immunology: No seasonal rhinitis. Denies frequent colds, sinus/ear infections. Neurological: No seizures/numbness/weakness. No paresthesias. Psychiatric:  No depression, anxiety. The problem list was updated as a part of today's visit. Patient Active Problem List   Diagnosis Code    Hypertension I10    Diabetes ([address] E11.9    GERD (gastroesophageal reflux disease) K21.9    Depression F32.9    HLD (hyperlipidemia) E78.5    Acquired hypothyroidism E03.9    Dizziness R42    Asthma J45.909    Sleep disturbance G47.9       The PSH, FH were reviewed. SH:  Social History   Substance Use Topics    Smoking status: Never Smoker    Smokeless tobacco: Never Used    Alcohol use Yes       Medications/Allergies:  Current Outpatient Prescriptions on File Prior to Visit   Medication Sig Dispense Refill    SITagliptin (JANUVIA) 100 mg tablet Take 1 Tab by mouth daily. 90 Tab 2    PARoxetine (PAXIL) 40 mg tablet Take 1 Tab by mouth daily. 90 Tab 2    LORazepam (ATIVAN) 1 mg tablet Start with 1/2 tab at night as needed. Can increase to full tab if needed. Indications: ANXIETY, INSOMNIA 30 Tab 1    gabapentin (NEURONTIN) 100 mg capsule Take 1 Cap by mouth three (3) times daily. 270 Cap 2    levothyroxine (SYNTHROID) 50 mcg tablet Take 1 Tab by mouth Daily (before breakfast). 90 Tab 2    propranolol LA (INDERAL LA) 60 mg SR capsule Take 1 Cap by mouth daily. Indications: MIGRAINE PREVENTION 90 Cap 1    fluticasone-salmeterol (ADVAIR) 100-50 mcg/dose diskus inhaler Take 1 Puff by inhalation every twelve (12) hours. 3 Inhaler 2    Omega-3-DHA-EPA-Fish Oil 1,000 mg (120 mg-180 mg) cap Take  by mouth daily.      Siria Caioler #11/OM3/DHA/EPA/LUT/SHASHANK ([address] 50+ PO) Take  by mouth daily.  metFORMIN (GLUCOPHAGE) 1,000 mg tablet Take 1 Tab by mouth two (2) times daily (with meals). 180 Tab 1    esomeprazole (NEXIUM) 20 mg capsule Take  by mouth daily. No current facility-administered medications on file prior to visit. Allergies   Allergen Reactions    Codeine Nausea and Vomiting         Health Maintenance:   Health Maintenance   Topic Date Due    Pneumococcal 65+ Low/Medium Risk (1 of 2 - PCV13) 04/05/2016    BREAST CANCER SCRN MAMMOGRAM  03/10/2017    HEMOGLOBIN A1C Q6M  05/28/2017    FOOT EXAM Q1  08/25/2017    MICROALBUMIN Q1  08/25/2017    LIPID PANEL Q1  08/25/2017    EYE EXAM RETINAL OR DILATED Q1  12/07/2017    MEDICARE YEARLY EXAM  01/19/2018    GLAUCOMA SCREENING Q2Y  12/07/2018    DTaP/Tdap/Td series (2 - Td) 08/11/2020    COLONOSCOPY  12/21/2021    Hepatitis C Screening  Completed    OSTEOPOROSIS SCREENING (DEXA)  Completed    ZOSTER VACCINE AGE 60>  Completed    INFLUENZA AGE 9 TO ADULT  Completed       Objective:  Visit Vitals    /58 (BP 1 Location: Left arm, BP Patient Position: Sitting)    Pulse 63    Temp 97.2 °F (36.2 °C)    Resp 20    Ht 5' 1\" (1.549 m)    Wt 139 lb 9.6 oz (63.3 kg)    SpO2 95%    BMI 26.38 kg/m2          Nurses notes and VS reviewed. Physical Examination: General appearance - alert, well appearing, and in no distress  Chest - clear to auscultation, no wheezes, rales or rhonchi, symmetric air entry  Heart - normal rate, regular rhythm, normal S1, S2, no murmurs, rubs, clicks or gallops  Skin - erythematous, pea sized lesions, one on each arm, on chest and leg.         Labwork and Ancillary Studies:    CBC w/Diff  Lab Results   Component Value Date/Time    WBC 10.4 08/25/2016 12:07 PM    HGB 11.9 08/25/2016 12:07 PM    PLATELET 479 28/37/2365 12:07 PM         Basic Metabolic Profile  Lab Results   Component Value Date/Time    Sodium 139 08/25/2016 12:07 PM    Potassium 3.4 08/25/2016 12:07 PM    Chloride 101 08/25/2016 12:07 PM    CO2 27 08/25/2016 12:07 PM    Anion gap 11 08/25/2016 12:07 PM    Glucose 178 08/25/2016 12:07 PM    BUN 7 08/25/2016 12:07 PM    Creatinine 0.62 08/25/2016 12:07 PM    BUN/Creatinine ratio 11 08/25/2016 12:07 PM    GFR est AA >60 08/25/2016 12:07 PM    GFR est non-AA >60 08/25/2016 12:07 PM    Calcium 8.9 08/25/2016 12:07 PM         LFT  Lab Results   Component Value Date/Time    ALT (SGPT) 17 08/25/2016 12:07 PM    AST (SGOT) 14 08/25/2016 12:07 PM    Alk.  phosphatase 53 08/25/2016 12:07 PM    Bilirubin, direct 0.2 08/25/2016 12:07 PM    Bilirubin, total 0.5 08/25/2016 12:07 PM         Cholesterol  Lab Results   Component Value Date/Time    Cholesterol, total 146 08/25/2016 12:07 PM    HDL Cholesterol 42 08/25/2016 12:07 PM    LDL, calculated 75.2 08/25/2016 12:07 PM    Triglyceride 144 08/25/2016 12:07 PM    CHOL/HDL Ratio 3.5 08/25/2016 12:07 PM

## 2017-03-22 NOTE — PROGRESS NOTES
Lg Mcclain is a 73 y.o. female  Chief Complaint   Patient presents with    Hypertension     3 months follow up     1. Have you been to the ER, urgent care clinic since your last visit? Hospitalized since your last visit? No    2. Have you seen or consulted any other health care providers outside of the [address] [place] [address] since your last visit? Include any pap smears or colon screening.  No

## 2017-04-14 RX ORDER — METFORMIN HYDROCHLORIDE 1000 MG/1
1000 TABLET ORAL 2 TIMES DAILY WITH MEALS
Qty: 180 TAB | Refills: 1 | Status: SHIPPED | OUTPATIENT
Start: 2017-04-14 | End: 2018-01-16 | Stop reason: SDUPTHER

## 2017-04-14 NOTE — TELEPHONE ENCOUNTER
Pt called needing refill of metformin right away. Also, her itching is not helped by the current triam.cream. Is there something else you can give to help with itching or a referral to derm? Please advise.

## 2017-06-02 DIAGNOSIS — R51.9 PERSISTENT HEADACHES: ICD-10-CM

## 2017-06-02 DIAGNOSIS — E03.9 ACQUIRED HYPOTHYROIDISM: ICD-10-CM

## 2017-06-02 NOTE — TELEPHONE ENCOUNTER
Pt also requesting :  Dulcosate Sodium 100 mg    Also stated Patient [facility] prescribed for: Migraine and veritgo  Ondansepron 4mg (take one once a day)   And   Meclizine 25mg (3x daily, every 8 hours)  Has about 8 tablets of these left wanting to know if Dr. Sia Odonnell will refill.

## 2017-06-06 ENCOUNTER — OFFICE VISIT (OUTPATIENT)
Dept: FAMILY MEDICINE CLINIC | Age: 67
End: 2017-06-06

## 2017-06-06 VITALS
HEART RATE: 68 BPM | HEIGHT: 61 IN | WEIGHT: 143 LBS | BODY MASS INDEX: 27 KG/M2 | RESPIRATION RATE: 16 BRPM | SYSTOLIC BLOOD PRESSURE: 138 MMHG | OXYGEN SATURATION: 98 % | DIASTOLIC BLOOD PRESSURE: 62 MMHG | TEMPERATURE: 98.1 F

## 2017-06-06 DIAGNOSIS — Z78.0 POSTMENOPAUSAL: ICD-10-CM

## 2017-06-06 DIAGNOSIS — E78.00 PURE HYPERCHOLESTEROLEMIA: ICD-10-CM

## 2017-06-06 DIAGNOSIS — I10 ESSENTIAL HYPERTENSION: ICD-10-CM

## 2017-06-06 DIAGNOSIS — R10.84 GENERALIZED ABDOMINAL PAIN: ICD-10-CM

## 2017-06-06 DIAGNOSIS — Z12.39 BREAST CANCER SCREENING: ICD-10-CM

## 2017-06-06 DIAGNOSIS — E11.42 TYPE 2 DIABETES MELLITUS WITH DIABETIC POLYNEUROPATHY, WITHOUT LONG-TERM CURRENT USE OF INSULIN (HCC): Primary | ICD-10-CM

## 2017-06-06 DIAGNOSIS — Z13.820 OSTEOPOROSIS SCREENING: ICD-10-CM

## 2017-06-06 LAB — HBA1C MFR BLD HPLC: 8.1 %

## 2017-06-06 RX ORDER — MECLIZINE HYDROCHLORIDE 25 MG/1
TABLET ORAL
COMMUNITY
End: 2017-06-26

## 2017-06-06 RX ORDER — ONDANSETRON 4 MG/1
4 TABLET, FILM COATED ORAL
COMMUNITY
End: 2017-09-01 | Stop reason: SDUPTHER

## 2017-06-06 NOTE — PROGRESS NOTES
Erma Rausch is a 78 y.o. female here today for follow up     1. Have you been to the ER, urgent care clinic or hospitalized since your last visit? YES. Urgent care for vertigo     2. Have you seen or consulted any other health care providers outside of the [address] [place] [address] since your last visit (Include any pap smears or colon screening)? NO      Do you have an Advanced Directive? NO    Would you like information on Advanced Directives?  NO      Learning Assessment 4/1/2016   PRIMARY LEARNER Patient   HIGHEST LEVEL OF EDUCATION - PRIMARY LEARNER  SOME COLLEGE   PRIMARY LANGUAGE ENGLISH   LEARNER PREFERENCE PRIMARY DEMONSTRATION   ANSWERED BY patient   RELATIONSHIP SELF

## 2017-06-06 NOTE — PROGRESS NOTES
Assessment/Plan:    Daniel Zambrano was seen today for hypertension and diabetes. Diagnoses and all orders for this visit:    Type 2 diabetes mellitus with diabetic polyneuropathy, without long-term current use of insulin (HCC)  -     AMB POC HEMOGLOBIN A1C    Essential hypertension    Pure hypercholesterolemia    Generalized abdominal pain  -     XR ABD FLAT/ ERECT; Future    Breast cancer screening  -     SREEDHAR MAMMO BI SCREENING INCL CAD; Future    Osteoporosis screening  -     DEXA BONE DENSITY STUDY AXIAL; Future    Postmenopausal  -     DEXA BONE DENSITY STUDY AXIAL; Future        Pt will f/u 3 mon. Will improve on diet and exercise. Will call her with results of xray. The plan was discussed with the patient. The patient verbalized understanding and is in agreement with the plan. All medication potential side effects were discussed with the patient.    -------------------------------------------------------------------------------------------------------------------        Stephon Gary is a 78 y.o. female and presents with Hypertension and Diabetes         Subjective:  Pt here for 3 mon f/u. She no showed for our last visit. DM: A1c in office today was 8.1%. Needs to be better. Having an abd pain, can be constipated at times. Has been going on 2 weeks. Had an appendectomy in July 2016. HTN: controlled. Pt has not been compliant in getting her mammo and DEXA done since last year. ROS:  Constitutional: No recent weight change. No weakness/fatigue. No f/c. Skin: No rashes, change in nails/hair, itching   HENT: No HA, dizziness. No hearing loss/tinnitus. No nasal congestion/discharge. Eyes: No change in vision, double/blurred vision or eye pain/redness. Cardiovascular: No CP/palpitations. No CRABTREE/orthopnea/PND. Respiratory: No cough/sputum, dyspnea, wheezing. Gastointestinal: No dysphagia, reflux. No n/v. No constipation/diarrhea. No melena/rectal bleeding. Genitourinary: No dysuria, urinary hesitancy, nocturia, hematuria. No incontinence. Musculoskeletal: No joint pain/stiffness. No muscle pain/tenderness. Endo: No heat/cold intolerance, no polyuria/polydypsia. Heme: No h/o anemia. No easy bleeding/bruising. Allergy/Immunology: No seasonal rhinitis. Denies frequent colds, sinus/ear infections. Neurological: No seizures/numbness/weakness. No paresthesias. Psychiatric:  No depression, anxiety. The problem list was updated as a part of today's visit. Patient Active Problem List   Diagnosis Code    Hypertension I10    Diabetes ([facility] [address] E11.9    GERD (gastroesophageal reflux disease) K21.9    Depression F32.9    HLD (hyperlipidemia) E78.5    Acquired hypothyroidism E03.9    Dizziness R42    Asthma J45.909    Sleep disturbance G47.9       The PSH, FH were reviewed. SH:  Social History   Substance Use Topics    Smoking status: Never Smoker    Smokeless tobacco: Never Used    Alcohol use Yes       Medications/Allergies:  Current Outpatient Prescriptions on File Prior to Visit   Medication Sig Dispense Refill    metFORMIN (GLUCOPHAGE) 1,000 mg tablet Take 1 Tab by mouth two (2) times daily (with meals). 180 Tab 1    hydroCHLOROthiazide (MICROZIDE) 12.5 mg capsule Take 1 Cap by mouth daily. 90 Cap 2    atorvastatin (LIPITOR) 10 mg tablet Take 1 Tab by mouth daily. 90 Tab 2    fenofibrate (LOFIBRA) 54 mg tablet Take 1 Tab by mouth daily. 90 Tab 2    lisinopril (PRINIVIL, ZESTRIL) 40 mg tablet Take 1 Tab by mouth daily. 90 Tab 2    SITagliptin (JANUVIA) 100 mg tablet Take 1 Tab by mouth daily. 90 Tab 2    PARoxetine (PAXIL) 40 mg tablet Take 1 Tab by mouth daily. 90 Tab 2    fluticasone-salmeterol (ADVAIR) 100-50 mcg/dose diskus inhaler Take 1 Puff by inhalation every twelve (12) hours. 3 Inhaler 2    Omega-3-DHA-EPA-Fish Oil 1,000 mg (120 mg-180 mg) cap Take  by mouth daily.      Shahnaz Parra #11/OM3/DHA/EPA/LUT/SHASHANK ([address] ADULT 50+ PO) Take  by mouth daily.  levothyroxine (SYNTHROID) 50 mcg tablet Take 1 Tab by mouth Daily (before breakfast). 90 Tab 2    gabapentin (NEURONTIN) 100 mg capsule Take 1 Cap by mouth three (3) times daily. 270 Cap 2    propranolol LA (INDERAL LA) 60 mg SR capsule Take 1 Cap by mouth daily. Indications: MIGRAINE PREVENTION 90 Cap 2    docusate sodium (COLACE) 100 mg capsule Take 1 Cap by mouth two (2) times a day for 90 days. 60 Cap 1    ondansetron hcl (ZOFRAN, AS HYDROCHLORIDE,) 4 mg tablet Take 1 Tab by mouth every eight (8) hours as needed for Nausea. 60 Tab 1    meclizine (ANTIVERT) 25 mg tablet Take 1 Tab by mouth three (3) times daily as needed. 60 Tab 1     No current facility-administered medications on file prior to visit. Allergies   Allergen Reactions    Codeine Nausea and Vomiting         Health Maintenance:   Health Maintenance   Topic Date Due    Pneumococcal 65+ Low/Medium Risk (1 of 2 - PCV13) 04/05/2016    BREAST CANCER SCRN MAMMOGRAM  03/10/2017    INFLUENZA AGE 9 TO ADULT  08/01/2017    FOOT EXAM Q1  08/25/2017    MICROALBUMIN Q1  08/25/2017    LIPID PANEL Q1  08/25/2017    HEMOGLOBIN A1C Q6M  12/06/2017    EYE EXAM RETINAL OR DILATED Q1  12/07/2017    MEDICARE YEARLY EXAM  01/19/2018    GLAUCOMA SCREENING Q2Y  12/07/2018    DTaP/Tdap/Td series (2 - Td) 08/11/2020    COLONOSCOPY  12/21/2021    Hepatitis C Screening  Completed    OSTEOPOROSIS SCREENING (DEXA)  Completed    ZOSTER VACCINE AGE 60>  Completed       Objective:  Visit Vitals    /62 (BP 1 Location: Left arm, BP Patient Position: Sitting)    Pulse 68    Temp 98.1 °F (36.7 °C) (Oral)    Resp 16    Ht 5' 1\" (1.549 m)    Wt 143 lb (64.9 kg)    SpO2 98%    BMI 27.02 kg/m2          Nurses notes and VS reviewed.       Physical Examination: General appearance - alert, well appearing, and in no distress  Chest - clear to auscultation, no wheezes, rales or rhonchi, symmetric air entry  Heart - normal rate and regular rhythm, systolic murmur 2/6  Abdomen - soft, nontender, nondistended, no masses or organomegaly  no rebound tenderness noted  bowel sounds hyperactive        Labwork and Ancillary Studies:    CBC w/Diff  Lab Results   Component Value Date/Time    WBC 10.4 08/25/2016 12:07 PM    HGB 11.9 08/25/2016 12:07 PM    PLATELET 844 84/53/1043 12:07 PM         Basic Metabolic Profile  Lab Results   Component Value Date/Time    Sodium 139 08/25/2016 12:07 PM    Potassium 3.4 08/25/2016 12:07 PM    Chloride 101 08/25/2016 12:07 PM    CO2 27 08/25/2016 12:07 PM    Anion gap 11 08/25/2016 12:07 PM    Glucose 178 08/25/2016 12:07 PM    BUN 7 08/25/2016 12:07 PM    Creatinine 0.62 08/25/2016 12:07 PM    BUN/Creatinine ratio 11 08/25/2016 12:07 PM    GFR est AA >60 08/25/2016 12:07 PM    GFR est non-AA >60 08/25/2016 12:07 PM    Calcium 8.9 08/25/2016 12:07 PM         LFT  Lab Results   Component Value Date/Time    ALT (SGPT) 17 08/25/2016 12:07 PM    AST (SGOT) 14 08/25/2016 12:07 PM    Alk.  phosphatase 53 08/25/2016 12:07 PM    Bilirubin, direct 0.2 08/25/2016 12:07 PM    Bilirubin, total 0.5 08/25/2016 12:07 PM         Cholesterol  Lab Results   Component Value Date/Time    Cholesterol, total 146 08/25/2016 12:07 PM    HDL Cholesterol 42 08/25/2016 12:07 PM    LDL, calculated 75.2 08/25/2016 12:07 PM    Triglyceride 144 08/25/2016 12:07 PM    CHOL/HDL Ratio 3.5 08/25/2016 12:07 PM

## 2017-06-06 NOTE — PATIENT INSTRUCTIONS

## 2017-06-06 NOTE — MR AVS SNAPSHOT
Visit Information Date & Time Provider Department Dept. Phone Encounter #  
 6/6/2017  9:30 AM Vickey Sheikh, 3 [facility] [phone] [id number] Upcoming Health Maintenance Date Due Pneumococcal 65+ Low/Medium Risk (1 of 2 - PCV13) 4/5/2016 BREAST CANCER SCRN MAMMOGRAM 3/10/2017 INFLUENZA AGE 9 TO ADULT 8/1/2017 FOOT EXAM Q1 8/25/2017 MICROALBUMIN Q1 8/25/2017 LIPID PANEL Q1 8/25/2017 HEMOGLOBIN A1C Q6M 9/22/2017 EYE EXAM RETINAL OR DILATED Q1 12/7/2017 MEDICARE YEARLY EXAM 1/19/2018 GLAUCOMA SCREENING Q2Y 12/7/2018 DTaP/Tdap/Td series (2 - Td) 8/11/2020 COLONOSCOPY 12/21/2021 Allergies as of 6/6/2017  Review Complete On: 6/6/2017 By: Vickey Sheikh MD  
  
 Severity Noted Reaction Type Reactions Codeine  04/01/2016    Nausea and Vomiting Current Immunizations  Reviewed on 1/18/2017 Name Date Influenza High Dose Vaccine PF 8/25/2016 11:56 AM  
 Influenza Vaccine 10/23/2015 Pneumococcal Polysaccharide (PPSV-23) 11/14/2014 Tdap 8/11/2010 Zoster Vaccine, Live 7/20/2011 Not reviewed this visit You Were Diagnosed With   
  
 Codes Comments Type 2 diabetes mellitus with diabetic polyneuropathy, without long-term current use of insulin (HCC)    -  Primary ICD-10-CM: E11.42 
ICD-9-CM: 250.60, 357.2 Essential hypertension     ICD-10-CM: I10 
ICD-9-CM: 401.9 Pure hypercholesterolemia     ICD-10-CM: E78.00 ICD-9-CM: 272.0 Generalized abdominal pain     ICD-10-CM: R10.84 ICD-9-CM: 789.07 Breast cancer screening     ICD-10-CM: Z12.39 
ICD-9-CM: V76.10 Osteoporosis screening     ICD-10-CM: Z13.820 ICD-9-CM: V82.81 Vitals BP Pulse Temp Resp Height(growth percentile) Weight(growth percentile) 150/72 (BP 1 Location: Left arm, BP Patient Position: Sitting) 68 98.1 °F (36.7 °C) (Oral) 16 5' 1\" (1.549 m) 143 lb (64.9 kg) SpO2 BMI OB Status Smoking Status 98% 27.02 kg/m2 Postmenopausal Never Smoker BMI and BSA Data Body Mass Index Body Surface Area  
 27.02 kg/m 2 1.67 m 2 Preferred Pharmacy Pharmacy Name Phone [address], [place] [phone] Your Updated Medication List  
  
   
This list is accurate as of: 6/6/17  9:54 AM.  Always use your most recent med list.  
  
  
  
  
 atorvastatin 10 mg tablet Commonly known as:  LIPITOR Take 1 Tab by mouth daily. esomeprazole 20 mg capsule Commonly known as:  Raejean Kirill Take  by mouth daily. fenofibrate 54 mg tablet Commonly known as:  LOFIBRA Take 1 Tab by mouth daily. fluticasone-salmeterol 100-50 mcg/dose diskus inhaler Commonly known as:  ADVAIR Take 1 Puff by inhalation every twelve (12) hours. gabapentin 100 mg capsule Commonly known as:  NEURONTIN Take 1 Cap by mouth three (3) times daily. hydroCHLOROthiazide 12.5 mg capsule Commonly known as:  Adelina Fells Take 1 Cap by mouth daily. levothyroxine 50 mcg tablet Commonly known as:  SYNTHROID Take 1 Tab by mouth Daily (before breakfast). lisinopril 40 mg tablet Commonly known as:  Trenton Banner Take 1 Tab by mouth daily. LORazepam 1 mg tablet Commonly known as:  ATIVAN Start with 1/2 tab at night as needed. Can increase to full tab if needed. Indications: ANXIETY, INSOMNIA  
  
 meclizine 25 mg tablet Commonly known as:  ANTIVERT Take  by mouth three (3) times daily as needed. metFORMIN 1,000 mg tablet Commonly known as:  GLUCOPHAGE Take 1 Tab by mouth two (2) times daily (with meals). OCUVITE ADULT 50+ PO Take  by mouth daily. Omega-3-DHA-EPA-Fish Oil 1,000 mg (120 mg-180 mg) Cap Take  by mouth daily. PARoxetine 40 mg tablet Commonly known as:  PAXIL Take 1 Tab by mouth daily. propranolol LA 60 mg SR capsule Commonly known as:  INDERAL LA  
 Take 1 Cap by mouth daily. Indications: MIGRAINE PREVENTION SITagliptin 100 mg tablet Commonly known as:  [place] Peggy Take 1 Tab by mouth daily. triamcinolone acetonide 0.025 % topical cream  
Commonly known as:  KENALOG Apply  to affected area two (2) times a day. use thin layer ZOFRAN (AS HYDROCHLORIDE) 4 mg tablet Generic drug:  ondansetron hcl Take 4 mg by mouth every eight (8) hours as needed for Nausea. To-Do List   
 06/06/2017 Imaging:  DEXA BONE DENSITY STUDY AXIAL   
  
 06/06/2017 Imaging:  SREEDHAR MAMMO BI SCREENING INCL CAD   
  
 06/06/2017 Imaging:  XR ABD FLAT/ ERECT Patient Instructions Learning About Diabetes Food Guidelines Your Care Instructions Meal planning is important to manage diabetes. It helps keep your blood sugar at a target level (which you set with your doctor). You don't have to eat special foods. You can eat what your family eats, including sweets once in a while. But you do have to pay attention to how often you eat and how much you eat of certain foods. You may want to work with a dietitian or a certified diabetes educator (CDE) to help you plan meals and snacks. A dietitian or CDE can also help you lose weight if that is one of your goals. What should you know about eating carbs? Managing the amount of carbohydrate (carbs) you eat is an important part of healthy meals when you have diabetes. Carbohydrate is found in many foods. · Learn which foods have carbs. And learn the amounts of carbs in different foods. ¨ Bread, cereal, pasta, and rice have about 15 grams of carbs in a serving. A serving is 1 slice of bread (1 ounce), ½ cup of cooked cereal, or 1/3 cup of cooked pasta or rice. ¨ Fruits have 15 grams of carbs in a serving.  A serving is 1 small fresh fruit, such as an apple or orange; ½ of a banana; ½ cup of cooked or canned fruit; ½ cup of fruit juice; 1 cup of melon or raspberries; or 2 tablespoons of dried fruit. ¨ Milk and no-sugar-added yogurt have 15 grams of carbs in a serving. A serving is 1 cup of milk or 2/3 cup of no-sugar-added yogurt. ¨ Starchy vegetables have 15 grams of carbs in a serving. A serving is ½ cup of mashed potatoes or sweet potato; 1 cup winter squash; ½ of a small baked potato; ½ cup of cooked beans; or ½ cup cooked corn or green peas. · Learn how much carbs to eat each day and at each meal. A dietitian or CDE can teach you how to keep track of the amount of carbs you eat. This is called carbohydrate counting. · If you are not sure how to count carbohydrate grams, use the Plate Method to plan meals. It is a good, quick way to make sure that you have a balanced meal. It also helps you spread carbs throughout the day. ¨ Divide your plate by types of foods. Put non-starchy vegetables on half the plate, meat or other protein food on one-quarter of the plate, and a grain or starchy vegetable in the final quarter of the plate. To this you can add a small piece of fruit and 1 cup of milk or yogurt, depending on how many carbs you are supposed to eat at a meal. 
· Try to eat about the same amount of carbs at each meal. Do not \"save up\" your daily allowance of carbs to eat at one meal. 
· Proteins have very little or no carbs per serving. Examples of proteins are beef, chicken, turkey, fish, eggs, tofu, cheese, cottage cheese, and peanut butter. A serving size of meat is 3 ounces, which is about the size of a deck of cards. Examples of meat substitute serving sizes (equal to 1 ounce of meat) are 1/4 cup of cottage cheese, 1 egg, 1 tablespoon of peanut butter, and ½ cup of tofu. How can you eat out and still eat healthy? · Learn to estimate the serving sizes of foods that have carbohydrate. If you measure food at home, it will be easier to estimate the amount in a serving of restaurant food.  
· If the meal you order has too much carbohydrate (such as potatoes, corn, or baked beans), ask to have a low-carbohydrate food instead. Ask for a salad or green vegetables. · If you use insulin, check your blood sugar before and after eating out to help you plan how much to eat in the future. · If you eat more carbohydrate at a meal than you had planned, take a walk or do other exercise. This will help lower your blood sugar. What else should you know? · Limit saturated fat, such as the fat from meat and dairy products. This is a healthy choice because people who have diabetes are at higher risk of heart disease. So choose lean cuts of meat and nonfat or low-fat dairy products. Use olive or canola oil instead of butter or shortening when cooking. · Don't skip meals. Your blood sugar may drop too low if you skip meals and take insulin or certain medicines for diabetes. · Check with your doctor before you drink alcohol. Alcohol can cause your blood sugar to drop too low. Alcohol can also cause a bad reaction if you take certain diabetes medicines. Follow-up care is a key part of your treatment and safety. Be sure to make and go to all appointments, and call your doctor if you are having problems. It's also a good idea to know your test results and keep a list of the medicines you take. Where can you learn more? Go to http://bertha-shayla.info/. Enter G033 in the search box to learn more about \"Learning About Diabetes Food Guidelines. \" Current as of: May 23, 2016 Content Version: 11.2 © 8171-3577 [employer]. Care instructions adapted under license by [employer] (which disclaims liability or warranty for this information). If you have questions about a medical condition or this instruction, always ask your healthcare professional. [place] [address] any warranty or liability for your use of this information. Introducing [facility]!    
 [employer] introduces [employer] patient portal. Now you can access parts of your medical record, email your doctor's office, and request medication refills online. 1. In your internet browser, go to https://"[employer]. Patient Home Monitoring/"[employer] 2. Click on the First Time User? Click Here link in the Sign In box. You will see the New Member Sign Up page. 3. Enter your amSTATZ Access Code exactly as it appears below. You will not need to use this code after youve completed the sign-up process. If you do not sign up before the expiration date, you must request a new code. · amSTATZ Access Code: [email or web address] Expires: 6/18/2017 12:09 PM 
 
4. Enter the last four digits of your Social Security Number (xxxx) and Date of Birth (mm/dd/yyyy) as indicated and click Submit. You will be taken to the next sign-up page. 5. Create a amSTATZ ID. This will be your amSTATZ login ID and cannot be changed, so think of one that is secure and easy to remember. 6. Create a amSTATZ password. You can change your password at any time. 7. Enter your Password Reset Question and Answer. This can be used at a later time if you forget your password. 8. Enter your e-mail address. You will receive e-mail notification when new information is available in [address] 9. Click Sign Up. You can now view and download portions of your medical record. 10. Click the Download Summary menu link to download a portable copy of your medical information. If you have questions, please visit the Frequently Asked Questions section of the amSTATZ website. Remember, amSTATZ is NOT to be used for urgent needs. For medical emergencies, dial 911. Now available from your iPhone and Android! Please provide this summary of care documentation to your next provider. Your primary care clinician is listed as [address] [facility]. If you have any questions after today's visit, please call [phone].

## 2017-06-07 RX ORDER — LEVOTHYROXINE SODIUM 50 UG/1
50 TABLET ORAL
Qty: 90 TAB | Refills: 2 | Status: SHIPPED | OUTPATIENT
Start: 2017-06-07 | End: 2017-07-21 | Stop reason: SDUPTHER

## 2017-06-07 RX ORDER — GABAPENTIN 100 MG/1
100 CAPSULE ORAL 3 TIMES DAILY
Qty: 270 CAP | Refills: 2 | Status: SHIPPED | OUTPATIENT
Start: 2017-06-07 | End: 2018-01-30 | Stop reason: SDUPTHER

## 2017-06-07 RX ORDER — DOCUSATE SODIUM 100 MG/1
100 CAPSULE, LIQUID FILLED ORAL 2 TIMES DAILY
Qty: 60 CAP | Refills: 1 | Status: SHIPPED | OUTPATIENT
Start: 2017-06-07 | End: 2017-09-05

## 2017-06-07 RX ORDER — MECLIZINE HYDROCHLORIDE 25 MG/1
25 TABLET ORAL
Qty: 60 TAB | Refills: 1 | Status: SHIPPED | OUTPATIENT
Start: 2017-06-07 | End: 2018-09-11 | Stop reason: SDUPTHER

## 2017-06-07 RX ORDER — ONDANSETRON 4 MG/1
4 TABLET, FILM COATED ORAL
Qty: 60 TAB | Refills: 1 | Status: SHIPPED | OUTPATIENT
Start: 2017-06-07 | End: 2018-09-11 | Stop reason: ALTCHOICE

## 2017-06-07 RX ORDER — PROPRANOLOL HYDROCHLORIDE 60 MG/1
60 CAPSULE, EXTENDED RELEASE ORAL DAILY
Qty: 90 CAP | Refills: 2 | Status: SHIPPED | OUTPATIENT
Start: 2017-06-07 | End: 2017-07-21 | Stop reason: SDUPTHER

## 2017-06-13 ENCOUNTER — TELEPHONE (OUTPATIENT)
Dept: FAMILY MEDICINE CLINIC | Age: 67
End: 2017-06-13

## 2017-06-13 NOTE — TELEPHONE ENCOUNTER
Jacinta from [facility] called stating she spoke with Daphney Cousins about the pt's bone density scan. She states she asked Yuliet for the pt's diagnosis codes over the phone but did not remember to ask for an updated order with new diagnosis.      [address] is requesting new order be faxed to [phone]

## 2017-06-23 ENCOUNTER — TELEPHONE (OUTPATIENT)
Dept: FAMILY MEDICINE CLINIC | Age: 67
End: 2017-06-23

## 2017-06-23 NOTE — TELEPHONE ENCOUNTER
Pt called stating bleeding after BM with blood clots x 2 days. Verified she is taking the Colace daily. She said she has \"nice\" stools sometimes. In Dr. Tahis Watson absence, Dr. Gracie Quintero advised to go to the ER if she feels dizzy, lightheaded or symptoms get worse. Continue Colace, fluids, no straining with bowel movements. Appt to follow up next week. Set appt for 6/26/17.

## 2017-06-26 ENCOUNTER — OFFICE VISIT (OUTPATIENT)
Dept: FAMILY MEDICINE CLINIC | Age: 67
End: 2017-06-26

## 2017-06-26 VITALS
HEIGHT: 61 IN | DIASTOLIC BLOOD PRESSURE: 74 MMHG | HEART RATE: 76 BPM | TEMPERATURE: 98.3 F | OXYGEN SATURATION: 95 % | WEIGHT: 142 LBS | BODY MASS INDEX: 26.81 KG/M2 | SYSTOLIC BLOOD PRESSURE: 176 MMHG | RESPIRATION RATE: 18 BRPM

## 2017-06-26 DIAGNOSIS — R41.3 MEMORY CHANGES: ICD-10-CM

## 2017-06-26 DIAGNOSIS — R42 DIZZINESS: ICD-10-CM

## 2017-06-26 DIAGNOSIS — K62.5 RECTAL BLEEDING: Primary | ICD-10-CM

## 2017-06-26 DIAGNOSIS — R10.33 PERIUMBILICAL ABDOMINAL PAIN: ICD-10-CM

## 2017-06-26 RX ORDER — CHLORTHALIDONE 25 MG/1
25 TABLET ORAL DAILY
Qty: 90 TAB | Refills: 1 | Status: SHIPPED | OUTPATIENT
Start: 2017-06-26 | End: 2017-10-27 | Stop reason: SDUPTHER

## 2017-06-26 NOTE — PATIENT INSTRUCTIONS
Dizziness: Care Instructions  Your Care Instructions  Dizziness is the feeling of unsteadiness or fuzziness in your head. It is different than having vertigo, which is a feeling that the room is spinning or that you are moving or falling. It is also different from lightheadedness, which is the feeling that you are about to faint. It can be hard to know what causes dizziness. Some people feel dizzy when they have migraine headaches. Sometimes bouts of flu can make you feel dizzy. Some medical conditions, such as heart problems or high blood pressure, can make you feel dizzy. Many medicines can cause dizziness, including medicines for high blood pressure, pain, or anxiety. If a medicine causes your symptoms, your doctor may recommend that you stop or change the medicine. If it is a problem with your heart, you may need medicine to help your heart work better. If there is no clear reason for your symptoms, your doctor may suggest watching and waiting for a while to see if the dizziness goes away on its own. Follow-up care is a key part of your treatment and safety. Be sure to make and go to all appointments, and call your doctor if you are having problems. It's also a good idea to know your test results and keep a list of the medicines you take. How can you care for yourself at home? · If your doctor recommends or prescribes medicine, take it exactly as directed. Call your doctor if you think you are having a problem with your medicine. · Do not drive while you feel dizzy. · Try to prevent falls. Steps you can take include:  ¨ Using nonskid mats, adding grab bars near the tub, and using night-lights. ¨ Clearing your home so that walkways are free of anything you might trip on. ¨ Letting family and friends know that you have been feeling dizzy. This will help them know how to help you. When should you call for help? Call 911 anytime you think you may need emergency care.  For example, call if:  · You passed out (lost consciousness). · You have dizziness along with symptoms of a heart attack. These may include:  ¨ Chest pain or pressure, or a strange feeling in the chest.  ¨ Sweating. ¨ Shortness of breath. ¨ Nausea or vomiting. ¨ Pain, pressure, or a strange feeling in the back, neck, jaw, or upper belly or in one or both shoulders or arms. ¨ Lightheadedness or sudden weakness. ¨ A fast or irregular heartbeat. · You have symptoms of a stroke. These may include:  ¨ Sudden numbness, tingling, weakness, or loss of movement in your face, arm, or leg, especially on only one side of your body. ¨ Sudden vision changes. ¨ Sudden trouble speaking. ¨ Sudden confusion or trouble understanding simple statements. ¨ Sudden problems with walking or balance. ¨ A sudden, severe headache that is different from past headaches. Call your doctor now or seek immediate medical care if:  · You feel dizzy and have a fever, headache, or ringing in your ears. · You have new or increased nausea and vomiting. · Your dizziness does not go away or comes back. Watch closely for changes in your health, and be sure to contact your doctor if:  · You do not get better as expected. Where can you learn more? Go to http://bertha-shayla.info/. Enter A202 in the search box to learn more about \"Dizziness: Care Instructions. \"  Current as of: March 20, 2017  Content Version: 11.3  © 6853-5311 [employer]. Care instructions adapted under license by [employer] (which disclaims liability or warranty for this information). If you have questions about a medical condition or this instruction, always ask your healthcare professional. [place] [address] any warranty or liability for your use of this information.

## 2017-06-26 NOTE — PROGRESS NOTES
Assessment/Plan:    Anika Cao was seen today for rectal bleeding. Diagnoses and all orders for this visit:    Rectal bleeding  -     REFERRAL TO COLON AND RECTAL SURGERY    Dizziness  -     REFERRAL TO ENT-OTOLARYNGOLOGY    Memory changes    Periumbilical abdominal pain  -     REFERRAL TO SURGERY    Other orders  -     chlorthalidone (HYGROTEN) 25 mg tablet; Take 1 Tab by mouth daily. Will stop HCTZ and start Chlorthalidone 25 mg.    F/u 4 weeks for HTN. The plan was discussed with the patient. The patient verbalized understanding and is in agreement with the plan. All medication potential side effects were discussed with the patient.    -------------------------------------------------------------------------------------------------------------------        Tyrese Lee is a 78 y.o. female and presents with Rectal Bleeding         Subjective:  Pt here for a few things. Has had about 2-3 episodes of rectal bleeding, in the form of a clot. Painless, is up to date with her colonoscopy. Has some issues with constipation and straining. Still dealing with the dizziness and sinus congestion. [facility] They found hearing loss and recommended she see ENT but she still has not done this. Has continued to have issues with her memory, seems to be getting worse. Has never been evaluated. Still dealing with periumbilical pain. Has appendectomy with Dr. Rody Whittington last Dec 2016. HTN: not controlled well. ROS:  Constitutional: No recent weight change. No weakness/fatigue. No f/c. Skin: No rashes, change in nails/hair, itching   HENT: No HA, dizziness. No hearing loss/tinnitus. No nasal congestion/discharge. Eyes: No change in vision, double/blurred vision or eye pain/redness. Cardiovascular: No CP/palpitations. No CRABTREE/orthopnea/PND. Respiratory: No cough/sputum, dyspnea, wheezing. Gastointestinal: No dysphagia, reflux. No n/v.   No constipation/diarrhea. No melena/rectal bleeding. Genitourinary: No dysuria, urinary hesitancy, nocturia, hematuria. No incontinence. Musculoskeletal: No joint pain/stiffness. No muscle pain/tenderness. Endo: No heat/cold intolerance, no polyuria/polydypsia. Heme: No h/o anemia. No easy bleeding/bruising. Allergy/Immunology: No seasonal rhinitis. Denies frequent colds, sinus/ear infections. Neurological: No seizures/numbness/weakness. No paresthesias. Psychiatric:  No depression, anxiety. The problem list was updated as a part of today's visit. Patient Active Problem List   Diagnosis Code    Hypertension I10    Diabetes ([facility] [address] E11.9    GERD (gastroesophageal reflux disease) K21.9    Depression F32.9    HLD (hyperlipidemia) E78.5    Acquired hypothyroidism E03.9    Dizziness R42    Asthma J45.909    Sleep disturbance G47.9       The PSH, FH were reviewed. SH:  Social History   Substance Use Topics    Smoking status: Never Smoker    Smokeless tobacco: Never Used    Alcohol use Yes       Medications/Allergies:  Current Outpatient Prescriptions on File Prior to Visit   Medication Sig Dispense Refill    levothyroxine (SYNTHROID) 50 mcg tablet Take 1 Tab by mouth Daily (before breakfast). 90 Tab 2    gabapentin (NEURONTIN) 100 mg capsule Take 1 Cap by mouth three (3) times daily. 270 Cap 2    propranolol LA (INDERAL LA) 60 mg SR capsule Take 1 Cap by mouth daily. Indications: MIGRAINE PREVENTION 90 Cap 2    docusate sodium (COLACE) 100 mg capsule Take 1 Cap by mouth two (2) times a day for 90 days. 60 Cap 1    ondansetron hcl (ZOFRAN, AS HYDROCHLORIDE,) 4 mg tablet Take 1 Tab by mouth every eight (8) hours as needed for Nausea. 60 Tab 1    meclizine (ANTIVERT) 25 mg tablet Take 1 Tab by mouth three (3) times daily as needed. 60 Tab 1    ondansetron hcl (ZOFRAN, AS HYDROCHLORIDE,) 4 mg tablet Take 4 mg by mouth every eight (8) hours as needed for Nausea.       metFORMIN (GLUCOPHAGE) 1,000 mg tablet Take 1 Tab by mouth two (2) times daily (with meals). 180 Tab 1    atorvastatin (LIPITOR) 10 mg tablet Take 1 Tab by mouth daily. 90 Tab 2    lisinopril (PRINIVIL, ZESTRIL) 40 mg tablet Take 1 Tab by mouth daily. 90 Tab 2    SITagliptin (JANUVIA) 100 mg tablet Take 1 Tab by mouth daily. 90 Tab 2    PARoxetine (PAXIL) 40 mg tablet Take 1 Tab by mouth daily. 90 Tab 2    fluticasone-salmeterol (ADVAIR) 100-50 mcg/dose diskus inhaler Take 1 Puff by inhalation every twelve (12) hours. 3 Inhaler 2    Omega-3-DHA-EPA-Fish Oil 1,000 mg (120 mg-180 mg) cap Take  by mouth daily.  ANTIOX #11/OM3/DHA/EPA/LUT/SHASHANK (OCUVITE ADULT 50+ PO) Take  by mouth daily.  fenofibrate (LOFIBRA) 54 mg tablet Take 1 Tab by mouth daily. 90 Tab 2     No current facility-administered medications on file prior to visit. Allergies   Allergen Reactions    Codeine Nausea and Vomiting         Health Maintenance:   Health Maintenance   Topic Date Due    Pneumococcal 65+ Low/Medium Risk (1 of 2 - PCV13) 04/05/2016    BREAST CANCER SCRN MAMMOGRAM  03/10/2017    INFLUENZA AGE 9 TO ADULT  08/01/2017    FOOT EXAM Q1  08/25/2017    MICROALBUMIN Q1  08/25/2017    LIPID PANEL Q1  08/25/2017    HEMOGLOBIN A1C Q6M  12/06/2017    EYE EXAM RETINAL OR DILATED Q1  12/07/2017    MEDICARE YEARLY EXAM  01/19/2018    GLAUCOMA SCREENING Q2Y  12/07/2018    DTaP/Tdap/Td series (2 - Td) 08/11/2020    COLONOSCOPY  12/21/2021    Hepatitis C Screening  Completed    OSTEOPOROSIS SCREENING (DEXA)  Completed    ZOSTER VACCINE AGE 60>  Completed       Objective:  Visit Vitals    /74    Pulse 76    Temp 98.3 °F (36.8 °C)    Resp 18    Ht 5' 1\" (1.549 m)    Wt 142 lb (64.4 kg)    SpO2 95%    BMI 26.83 kg/m2          Nurses notes and VS reviewed.       Physical Examination: General appearance - alert, well appearing, and in no distress        Labwork and Ancillary Studies:    CBC w/Diff  Lab Results Component Value Date/Time    WBC 10.4 08/25/2016 12:07 PM    HGB 11.9 08/25/2016 12:07 PM    PLATELET 340 19/92/4888 12:07 PM         Basic Metabolic Profile  Lab Results   Component Value Date/Time    Sodium 139 08/25/2016 12:07 PM    Potassium 3.4 08/25/2016 12:07 PM    Chloride 101 08/25/2016 12:07 PM    CO2 27 08/25/2016 12:07 PM    Anion gap 11 08/25/2016 12:07 PM    Glucose 178 08/25/2016 12:07 PM    BUN 7 08/25/2016 12:07 PM    Creatinine 0.62 08/25/2016 12:07 PM    BUN/Creatinine ratio 11 08/25/2016 12:07 PM    GFR est AA >60 08/25/2016 12:07 PM    GFR est non-AA >60 08/25/2016 12:07 PM    Calcium 8.9 08/25/2016 12:07 PM         LFT  Lab Results   Component Value Date/Time    ALT (SGPT) 17 08/25/2016 12:07 PM    AST (SGOT) 14 08/25/2016 12:07 PM    Alk.  phosphatase 53 08/25/2016 12:07 PM    Bilirubin, direct 0.2 08/25/2016 12:07 PM    Bilirubin, total 0.5 08/25/2016 12:07 PM         Cholesterol  Lab Results   Component Value Date/Time    Cholesterol, total 146 08/25/2016 12:07 PM    HDL Cholesterol 42 08/25/2016 12:07 PM    LDL, calculated 75.2 08/25/2016 12:07 PM    Triglyceride 144 08/25/2016 12:07 PM    CHOL/HDL Ratio 3.5 08/25/2016 12:07 PM

## 2017-06-26 NOTE — MR AVS SNAPSHOT
Visit Information Date & Time Provider Department Dept. Phone Encounter #  
 6/26/2017 10:45 AM Ulisses Ford, 3 [facility] [phone] [id number] Upcoming Health Maintenance Date Due Pneumococcal 65+ Low/Medium Risk (1 of 2 - PCV13) 4/5/2016 BREAST CANCER SCRN MAMMOGRAM 3/10/2017 INFLUENZA AGE 9 TO ADULT 8/1/2017 FOOT EXAM Q1 8/25/2017 MICROALBUMIN Q1 8/25/2017 LIPID PANEL Q1 8/25/2017 HEMOGLOBIN A1C Q6M 12/6/2017 EYE EXAM RETINAL OR DILATED Q1 12/7/2017 MEDICARE YEARLY EXAM 1/19/2018 GLAUCOMA SCREENING Q2Y 12/7/2018 DTaP/Tdap/Td series (2 - Td) 8/11/2020 COLONOSCOPY 12/21/2021 Allergies as of 6/26/2017  Review Complete On: 6/26/2017 By: Ulisses Ford MD  
  
 Severity Noted Reaction Type Reactions Codeine  04/01/2016    Nausea and Vomiting Current Immunizations  Reviewed on 1/18/2017 Name Date Influenza High Dose Vaccine PF 8/25/2016 11:56 AM  
 Influenza Vaccine 10/23/2015 Pneumococcal Polysaccharide (PPSV-23) 11/14/2014 Tdap 8/11/2010 Zoster Vaccine, Live 7/20/2011 Not reviewed this visit You Were Diagnosed With   
  
 Codes Comments Rectal bleeding    -  Primary ICD-10-CM: K62.5 ICD-9-CM: 569.3 Dizziness     ICD-10-CM: X61 ICD-9-CM: 780.4 Memory changes     ICD-10-CM: R41.3 ICD-9-CM: 780.93 Periumbilical abdominal pain     ICD-10-CM: R10.33 ICD-9-CM: 789.05 Vitals BP Pulse Temp Resp Height(growth percentile) Weight(growth percentile) 176/74 76 98.3 °F (36.8 °C) 18 5' 1\" (1.549 m) 142 lb (64.4 kg) SpO2 BMI OB Status Smoking Status 95% 26.83 kg/m2 Postmenopausal Never Smoker Vitals History BMI and BSA Data Body Mass Index Body Surface Area  
 26.83 kg/m 2 1.66 m 2 Preferred Pharmacy Pharmacy Name Phone [address] [phone] Your Updated Medication List  
  
   
This list is accurate as of: 6/26/17 11:16 AM.  Always use your most recent med list.  
  
  
  
  
 atorvastatin 10 mg tablet Commonly known as:  LIPITOR Take 1 Tab by mouth daily. chlorthalidone 25 mg tablet Commonly known as:  Aries Roots Take 1 Tab by mouth daily. docusate sodium 100 mg capsule Commonly known as:  David Waltoner Take 1 Cap by mouth two (2) times a day for 90 days. fenofibrate 54 mg tablet Commonly known as:  LOFIBRA Take 1 Tab by mouth daily. fluticasone-salmeterol 100-50 mcg/dose diskus inhaler Commonly known as:  ADVAIR Take 1 Puff by inhalation every twelve (12) hours. gabapentin 100 mg capsule Commonly known as:  NEURONTIN Take 1 Cap by mouth three (3) times daily. levothyroxine 50 mcg tablet Commonly known as:  SYNTHROID Take 1 Tab by mouth Daily (before breakfast). lisinopril 40 mg tablet Commonly known as:  Rowdy Lofty Take 1 Tab by mouth daily. meclizine 25 mg tablet Commonly known as:  ANTIVERT Take 1 Tab by mouth three (3) times daily as needed. metFORMIN 1,000 mg tablet Commonly known as:  GLUCOPHAGE Take 1 Tab by mouth two (2) times daily (with meals). OCUVITE ADULT 50+ PO Take  by mouth daily. Omega-3-DHA-EPA-Fish Oil 1,000 mg (120 mg-180 mg) Cap Take  by mouth daily. * ZOFRAN (AS HYDROCHLORIDE) 4 mg tablet Generic drug:  ondansetron hcl Take 4 mg by mouth every eight (8) hours as needed for Nausea. * ondansetron hcl 4 mg tablet Commonly known as:  ZOFRAN (AS HYDROCHLORIDE) Take 1 Tab by mouth every eight (8) hours as needed for Nausea. PARoxetine 40 mg tablet Commonly known as:  PAXIL Take 1 Tab by mouth daily. propranolol LA 60 mg SR capsule Commonly known as:  INDERAL LA Take 1 Cap by mouth daily. Indications: MIGRAINE PREVENTION SITagliptin 100 mg tablet Commonly known as:  Candida Trent Take 1 Tab by mouth daily. * Notice: This list has 2 medication(s) that are the same as other medications prescribed for you. Read the directions carefully, and ask your doctor or other care provider to review them with you. Prescriptions Sent to Pharmacy Refills  
 chlorthalidone (HYGROTEN) 25 mg tablet 1 Sig: Take 1 Tab by mouth daily. Class: Normal  
 Pharmacy: [address] [facility] [address] [facility], [facility] Gurwinder Funes  #: [phone] Route: Oral  
  
We Performed the Following REFERRAL TO COLON AND RECTAL SURGERY [REF17 Custom] REFERRAL TO ENT-OTOLARYNGOLOGY [LPH35 Custom] REFERRAL TO SURGERY [ZVT080 Custom] Referral Information Referral ID Referred By Referred To  
  
 [id number] [facility] 671[address] [facility] [address] Phone: [phone] Fax: [phone] Visits Status Start Date End Date 1 New Request 6/26/17 6/26/18 If your referral has a status of pending review or denied, additional information will be sent to support the outcome of this decision. Referral ID Referred By Referred To  
 [id number] [facility] [address] [facility] [address] [place] [address] Phone: [phone] Fax: [phone] Visits Status Start Date End Date 1 New Request 6/26/17 6/26/18 If your referral has a status of pending review or denied, additional information will be sent to support the outcome of this decision. Referral ID Referred By Referred To  
 [id number] Ramone Caputo MD  
   [address] [facility] [address] [facility] Phone: [phone] Fax: [phone] Visits Status Start Date End Date 1 New Request 6/26/17 6/26/18  If your referral has a status of pending review or denied, additional information will be sent to support the outcome of this decision. Patient Instructions Dizziness: Care Instructions Your Care Instructions Dizziness is the feeling of unsteadiness or fuzziness in your head. It is different than having vertigo, which is a feeling that the room is spinning or that you are moving or falling. It is also different from lightheadedness, which is the feeling that you are about to faint. It can be hard to know what causes dizziness. Some people feel dizzy when they have migraine headaches. Sometimes bouts of flu can make you feel dizzy. Some medical conditions, such as heart problems or high blood pressure, can make you feel dizzy. Many medicines can cause dizziness, including medicines for high blood pressure, pain, or anxiety. If a medicine causes your symptoms, your doctor may recommend that you stop or change the medicine. If it is a problem with your heart, you may need medicine to help your heart work better. If there is no clear reason for your symptoms, your doctor may suggest watching and waiting for a while to see if the dizziness goes away on its own. Follow-up care is a key part of your treatment and safety. Be sure to make and go to all appointments, and call your doctor if you are having problems. It's also a good idea to know your test results and keep a list of the medicines you take. How can you care for yourself at home? · If your doctor recommends or prescribes medicine, take it exactly as directed. Call your doctor if you think you are having a problem with your medicine. · Do not drive while you feel dizzy. · Try to prevent falls. Steps you can take include: ¨ Using nonskid mats, adding grab bars near the tub, and using night-lights. ¨ Clearing your home so that walkways are free of anything you might trip on. ¨ Letting family and friends know that you have been feeling dizzy. This will help them know how to help you. When should you call for help? Call 911 anytime you think you may need emergency care. For example, call if: 
· You passed out (lost consciousness). · You have dizziness along with symptoms of a heart attack. These may include: ¨ Chest pain or pressure, or a strange feeling in the chest. 
¨ Sweating. ¨ Shortness of breath. ¨ Nausea or vomiting. ¨ Pain, pressure, or a strange feeling in the back, neck, jaw, or upper belly or in one or both shoulders or arms. ¨ Lightheadedness or sudden weakness. ¨ A fast or irregular heartbeat. · You have symptoms of a stroke. These may include: 
¨ Sudden numbness, tingling, weakness, or loss of movement in your face, arm, or leg, especially on only one side of your body. ¨ Sudden vision changes. ¨ Sudden trouble speaking. ¨ Sudden confusion or trouble understanding simple statements. ¨ Sudden problems with walking or balance. ¨ A sudden, severe headache that is different from past headaches. Call your doctor now or seek immediate medical care if: 
· You feel dizzy and have a fever, headache, or ringing in your ears. · You have new or increased nausea and vomiting. · Your dizziness does not go away or comes back. Watch closely for changes in your health, and be sure to contact your doctor if: 
· You do not get better as expected. Where can you learn more? Go to http://bertha-shayla.info/. Enter F721 in the search box to learn more about \"Dizziness: Care Instructions. \" Current as of: March 20, 2017 Content Version: 11.3 © 0413-9254 [employer]. Care instructions adapted under license by [employer] (which disclaims liability or warranty for this information). If you have questions about a medical condition or this instruction, always ask your healthcare professional. [place] [address] any warranty or liability for your use of this information. Introducing [facility]! [address] [facility] introduces [employer] patient portal. Now you can access parts of your medical record, email your doctor's office, and request medication refills online. 1. In your internet browser, go to [email or web address] 2. Click on the First Time User? Click Here link in the Sign In box. You will see the New Member Sign Up page. 3. Enter your [employer] Access Code exactly as it appears below. You will not need to use this code after youve completed the sign-up process. If you do not sign up before the expiration date, you must request a new code. · [employer] Access Code: [email or web address] Expires: 9/24/2017 11:11 AM 
 
4. Enter the last four digits of your Social Security Number (xxxx) and Date of Birth (mm/dd/yyyy) as indicated and click Submit. You will be taken to the next sign-up page. 5. Create a [employer] ID. This will be your [employer] login ID and cannot be changed, so think of one that is secure and easy to remember. 6. Create a [employer] password. You can change your password at any time. 7. Enter your Password Reset Question and Answer. This can be used at a later time if you forget your password. 8. Enter your e-mail address. You will receive e-mail notification when new information is available in [address] 9. Click Sign Up. You can now view and download portions of your medical record. 10. Click the Download Summary menu link to download a portable copy of your medical information. If you have questions, please visit the Frequently Asked Questions section of the [employer] website. Remember, [employer] is NOT to be used for urgent needs. For medical emergencies, dial 911. Now available from your iPhone and Android! Please provide this summary of care documentation to your next provider. Your primary care clinician is listed as Hector 51. If you have any questions after today's visit, please call [phone].

## 2017-06-26 NOTE — PROGRESS NOTES
Zee Herrera is a 78 y.o. female  Here today with complaints blood in stool. 1. Have you been to the ER, urgent care clinic since your last visit? Hospitalized since your last visit? Yes Reason for visit: urgent care 6/15/17 vertigo    2. Have you seen or consulted any other health care providers outside of the [address] [place] [address] since your last visit? Include any pap smears or colon screening.  No

## 2017-07-21 DIAGNOSIS — E03.9 ACQUIRED HYPOTHYROIDISM: ICD-10-CM

## 2017-07-21 DIAGNOSIS — R51.9 PERSISTENT HEADACHES: ICD-10-CM

## 2017-07-23 RX ORDER — LEVOTHYROXINE SODIUM 50 UG/1
50 TABLET ORAL
Qty: 90 TAB | Refills: 2 | Status: SHIPPED | OUTPATIENT
Start: 2017-07-23 | End: 2018-01-30 | Stop reason: SDUPTHER

## 2017-07-23 RX ORDER — PROPRANOLOL HYDROCHLORIDE 60 MG/1
60 CAPSULE, EXTENDED RELEASE ORAL DAILY
Qty: 90 CAP | Refills: 2 | Status: SHIPPED | OUTPATIENT
Start: 2017-07-23 | End: 2018-01-30 | Stop reason: SDUPTHER

## 2017-07-25 ENCOUNTER — TELEPHONE (OUTPATIENT)
Dept: FAMILY MEDICINE CLINIC | Age: 67
End: 2017-07-25

## 2017-07-25 NOTE — TELEPHONE ENCOUNTER
Pt called stating she went to the [facility] yesterday and they did not have any medications there for her to . I informed the pt that her Levothyroxine and Propranolol was sent in on 7/23. Pt states she was told by the pharmacist that there was nothing there for her to .     Please advise

## 2017-07-25 NOTE — TELEPHONE ENCOUNTER
Spoke to pharmacy, they do have the scripts, called the patient and let her know she must say \" outside physician\" ordered her medications.

## 2017-07-26 ENCOUNTER — OFFICE VISIT (OUTPATIENT)
Dept: SURGERY | Age: 67
End: 2017-07-26

## 2017-07-26 NOTE — PROGRESS NOTES
Patient was inadvertently scheduled for a nurse visit for colon screening when she really needs to be seen by Dr. Marianne White for rectal bleeding. Pt had colonoscopy by Dr. Marianne White performed 12/12/16. She is experiencing rectal bleeding and possible hemorrhoids or prolapse. Rescheduled patient for an appointment with Dr. Marianne White. Chart closed for admin purposes.

## 2017-07-27 ENCOUNTER — DOCUMENTATION ONLY (OUTPATIENT)
Dept: FAMILY MEDICINE CLINIC | Age: 67
End: 2017-07-27

## 2017-07-27 NOTE — PROGRESS NOTES
Patient called questioning if she should be taking hydrochlorothiazide she has 2 refills left spoke with dr Hunter House chlorthalidone took the place of this and patient should discontinue hctz and discard this medication patient voiced understanding

## 2017-08-08 ENCOUNTER — OFFICE VISIT (OUTPATIENT)
Dept: SURGERY | Age: 67
End: 2017-08-08

## 2017-08-08 VITALS
RESPIRATION RATE: 20 BRPM | BODY MASS INDEX: 26.81 KG/M2 | HEART RATE: 69 BPM | DIASTOLIC BLOOD PRESSURE: 76 MMHG | HEIGHT: 61 IN | SYSTOLIC BLOOD PRESSURE: 133 MMHG | TEMPERATURE: 96.9 F | WEIGHT: 142 LBS

## 2017-08-08 DIAGNOSIS — K62.5 RECTAL BLEEDING: Primary | ICD-10-CM

## 2017-08-08 NOTE — PROGRESS NOTES
[facility]  Colon and Rectal Surgery  [address] [address], [address] [place] [address]              Colon and Rectal Surgery        Patient: Vivien Harris  MRN: [id number]  Date: 8/8/2017     Age:  78 y.o.,      Sex: female    YOB: 1950      Subjective    Ms. Amadeo Louis is an 78 y.o. female here for evaluation of her recent episode of painless bright red anal outlet bleeding. This occurred about 3 weeks ago and has not recurred. She denies any previous similar episodes. She has not had previous rectal surgery.  denies associated fever. A history of inflammatory bowel disease has not been reported. Bowel habits are reported as constipated often. Otherwise the patient denies any anorectal pain, change in bowel habits, weight changes, nor any abdominal pain. Patient denies vomiting, diarrhea, bloody stools, mucousy stools, reflux and nausea. Colonoscopy was performed on 12/12/2016 with complete removal of 2 adenomatous polyps. The family history is negative for colon cancer/polyps, other GI malignancies, nor inflammatory bowel diseases. Past Medical History:   Diagnosis Date    Acquired hypothyroidism 4/1/2016    Asthma 4/1/2016    Depression     Diabetes ([address]     Diabetic neuropathy (HCC)     feet    Dizziness     GERD (gastroesophageal reflux disease)     HLD (hyperlipidemia) 4/1/2016    Hypercholesterolemia     Hypertension     Sleep disturbance 7/25/2016    Thyroid disease        Past Surgical History:   Procedure Laterality Date    HX APPENDECTOMY  07/14/2016    Dr. Loi Franco HX COLONOSCOPY  12/12/2016    Repeat colonoscopy in 5 years per Dr. Neo Jones; last 12/12/16    HX HERNIA REPAIR  July 2016       Allergies   Allergen Reactions    Codeine Nausea and Vomiting       Prior to Admission medications    Medication Sig Start Date End Date Taking?  Authorizing Provider   propranolol LA (INDERAL LA) 60 mg SR capsule Take 1 Cap by mouth daily. Indications: MIGRAINE PREVENTION 7/23/17  Yes Bridgette Flower MD   levothyroxine (SYNTHROID) 50 mcg tablet Take 1 Tab by mouth Daily (before breakfast). 7/23/17  Yes Bridgette Flower MD   chlorthalidone (HYGROTEN) 25 mg tablet Take 1 Tab by mouth daily. 6/26/17  Yes Bridgette Flower MD   gabapentin (NEURONTIN) 100 mg capsule Take 1 Cap by mouth three (3) times daily. 6/7/17  Yes Bridgette Flower MD   docusate sodium (COLACE) 100 mg capsule Take 1 Cap by mouth two (2) times a day for 90 days. 6/7/17 9/5/17 Yes Bridgette Flower MD   ondansetron hcl (ZOFRAN, AS HYDROCHLORIDE,) 4 mg tablet Take 1 Tab by mouth every eight (8) hours as needed for Nausea. 6/7/17  Yes Bridgette Flower MD   meclizine (ANTIVERT) 25 mg tablet Take 1 Tab by mouth three (3) times daily as needed. 6/7/17  Yes Bridgette Flower MD   ondansetron hcl (ZOFRAN, AS HYDROCHLORIDE,) 4 mg tablet Take 4 mg by mouth every eight (8) hours as needed for Nausea. Yes Historical Provider   metFORMIN (GLUCOPHAGE) 1,000 mg tablet Take 1 Tab by mouth two (2) times daily (with meals). 4/14/17  Yes Bridgette Flower MD   atorvastatin (LIPITOR) 10 mg tablet Take 1 Tab by mouth daily. 3/22/17  Yes Bridgette Flower MD   fenofibrate (LOFIBRA) 54 mg tablet Take 1 Tab by mouth daily. 3/22/17  Yes Bridgette Flower MD   lisinopril (PRINIVIL, ZESTRIL) 40 mg tablet Take 1 Tab by mouth daily. 3/22/17  Yes Bridgette Flower MD   SITagliptin (JANUVIA) 100 mg tablet Take 1 Tab by mouth daily. 3/10/17  Yes Bridgette Flower MD   PARoxetine (PAXIL) 40 mg tablet Take 1 Tab by mouth daily. 3/10/17  Yes Bridgette Flower MD   fluticasone-salmeterol (ADVAIR) 100-50 mcg/dose diskus inhaler Take 1 Puff by inhalation every twelve (12) hours. 9/19/16  Yes Bridgette Flower MD   Omega-3-DHA-EPA-Fish Oil 1,000 mg (120 mg-180 mg) cap Take  by mouth daily.    Yes Historical Provider   ANTIOX #11/OM3/DHA/EPA/LUT/SHASHANK (OCUVITE ADULT 50+ PO) Take by mouth daily. Yes Historical Provider       Current Outpatient Prescriptions   Medication Sig Dispense Refill    propranolol LA (INDERAL LA) 60 mg SR capsule Take 1 Cap by mouth daily. Indications: MIGRAINE PREVENTION 90 Cap 2    levothyroxine (SYNTHROID) 50 mcg tablet Take 1 Tab by mouth Daily (before breakfast). 90 Tab 2    chlorthalidone (HYGROTEN) 25 mg tablet Take 1 Tab by mouth daily. 90 Tab 1    gabapentin (NEURONTIN) 100 mg capsule Take 1 Cap by mouth three (3) times daily. 270 Cap 2    docusate sodium (COLACE) 100 mg capsule Take 1 Cap by mouth two (2) times a day for 90 days. 60 Cap 1    ondansetron hcl (ZOFRAN, AS HYDROCHLORIDE,) 4 mg tablet Take 1 Tab by mouth every eight (8) hours as needed for Nausea. 60 Tab 1    meclizine (ANTIVERT) 25 mg tablet Take 1 Tab by mouth three (3) times daily as needed. 60 Tab 1    ondansetron hcl (ZOFRAN, AS HYDROCHLORIDE,) 4 mg tablet Take 4 mg by mouth every eight (8) hours as needed for Nausea.  metFORMIN (GLUCOPHAGE) 1,000 mg tablet Take 1 Tab by mouth two (2) times daily (with meals). 180 Tab 1    atorvastatin (LIPITOR) 10 mg tablet Take 1 Tab by mouth daily. 90 Tab 2    fenofibrate (LOFIBRA) 54 mg tablet Take 1 Tab by mouth daily. 90 Tab 2    lisinopril (PRINIVIL, ZESTRIL) 40 mg tablet Take 1 Tab by mouth daily. 90 Tab 2    SITagliptin (JANUVIA) 100 mg tablet Take 1 Tab by mouth daily. 90 Tab 2    PARoxetine (PAXIL) 40 mg tablet Take 1 Tab by mouth daily. 90 Tab 2    fluticasone-salmeterol (ADVAIR) 100-50 mcg/dose diskus inhaler Take 1 Puff by inhalation every twelve (12) hours. 3 Inhaler 2    Omega-3-DHA-EPA-Fish Oil 1,000 mg (120 mg-180 mg) cap Take  by mouth daily.  ANTIOX #11/OM3/DHA/EPA/LUT/SHASHANK (OCUVITE ADULT 50+ PO) Take  by mouth daily. Social History     Social History    Marital status:      Spouse name: N/A    Number of children: N/A    Years of education: N/A     Occupational History    Not on file. Social History Main Topics    Smoking status: Never Smoker    Smokeless tobacco: Never Used    Alcohol use Yes    Drug use: No    Sexual activity: No     Other Topics Concern    Not on file     Social History Narrative       Family History   Problem Relation Age of Onset    Diabetes Mother     Heart Disease Mother     Thyroid Disease Sister          Objective:        Visit Vitals    /76 (BP 1 Location: Left arm, BP Patient Position: At rest)    Pulse 69    Temp 96.9 °F (36.1 °C) (Oral)    Resp 20    Ht 5' 1\" (1.549 m)    Wt 64.4 kg (142 lb)    BMI 26.83 kg/m2       Physical Exam:   GENERAL: alert, cooperative, no distress, appears stated age  ABDOMEN: soft, non-tender. Bowel sounds normal. No masses,  no organomegaly  EXTREMITIES:  extremities normal, atraumatic, no cyanosis or edema     Anorectal:  With the patient in the prone position the anus appeared within normal limits with small benign sentinel tags. Digital rectal examination revealed Normal sphincter tone and squeeze pressure. Palpation revealed No Masses. Anoscopy revealed minimally inflamed but non-diseased internal hemorrhoids. Assessment / Plan    Ms. Tono Arias is an 78 y.o. female with recent episode of isolated anal outlet bleeding. This has not recurred. Also clinical exam suggests isolated hemorrhoid bleeding with resolution. The patient was reassured. I recommended high fiber diet to manage her constipation and sitz baths as needed. Otherwise the patient will contact me if she has any recurrent bleeding. She will also follow up in about 4 years for her colonoscopy exam as scheduled.         Balta Fuentes MD, FACS, FASCRS  Colon and Rectal Surgery   [facility]  Office [phone]  Fax     [phone]  8/8/2017  2:06 PM

## 2017-08-08 NOTE — MR AVS SNAPSHOT
Visit Information Date & Time Provider Department Dept. Phone Encounter #  
 8/8/2017  1:00 PM Emerson Hendricks MD [facility] [phone] [id number] Upcoming Health Maintenance Date Due Pneumococcal 65+ Low/Medium Risk (1 of 2 - PCV13) 4/5/2016 BREAST CANCER SCRN MAMMOGRAM 3/10/2017 INFLUENZA AGE 9 TO ADULT 8/1/2017 FOOT EXAM Q1 8/25/2017 MICROALBUMIN Q1 8/25/2017 LIPID PANEL Q1 8/25/2017 HEMOGLOBIN A1C Q6M 12/6/2017 EYE EXAM RETINAL OR DILATED Q1 12/7/2017 MEDICARE YEARLY EXAM 1/19/2018 GLAUCOMA SCREENING Q2Y 12/7/2018 DTaP/Tdap/Td series (2 - Td) 8/11/2020 COLONOSCOPY 12/21/2021 Allergies as of 8/8/2017  Review Complete On: 8/8/2017 By: Margret Waterman Severity Noted Reaction Type Reactions Codeine  04/01/2016    Nausea and Vomiting Current Immunizations  Reviewed on 1/18/2017 Name Date Influenza High Dose Vaccine PF 8/25/2016 11:56 AM  
 Influenza Vaccine 10/23/2015 Pneumococcal Polysaccharide (PPSV-23) 11/14/2014 Tdap 8/11/2010 Zoster Vaccine, Live 7/20/2011 Not reviewed this visit Vitals BP Pulse Temp Resp Height(growth percentile) Weight(growth percentile) 133/76 (BP 1 Location: Left arm, BP Patient Position: At rest) 69 96.9 °F (36.1 °C) (Oral) 20 5' 1\" (1.549 m) 142 lb (64.4 kg) BMI OB Status Smoking Status 26.83 kg/m2 Postmenopausal Never Smoker Vitals History BMI and BSA Data Body Mass Index Body Surface Area  
 26.83 kg/m 2 1.66 m 2 Preferred Pharmacy Pharmacy Name Phone [address], [place] [phone] Your Updated Medication List  
  
   
This list is accurate as of: 8/8/17  1:56 PM.  Always use your most recent med list.  
  
  
  
  
 atorvastatin 10 mg tablet Commonly known as:  LIPITOR Take 1 Tab by mouth daily. chlorthalidone 25 mg tablet Commonly known as:  Ulyess Cirri Take 1 Tab by mouth daily. docusate sodium 100 mg capsule Commonly known as:  Hope Marianne Take 1 Cap by mouth two (2) times a day for 90 days. fenofibrate 54 mg tablet Commonly known as:  LOFIBRA Take 1 Tab by mouth daily. fluticasone-salmeterol 100-50 mcg/dose diskus inhaler Commonly known as:  ADVAIR Take 1 Puff by inhalation every twelve (12) hours. gabapentin 100 mg capsule Commonly known as:  NEURONTIN Take 1 Cap by mouth three (3) times daily. levothyroxine 50 mcg tablet Commonly known as:  SYNTHROID Take 1 Tab by mouth Daily (before breakfast). lisinopril 40 mg tablet Commonly known as:  Velton Gallegos Take 1 Tab by mouth daily. meclizine 25 mg tablet Commonly known as:  ANTIVERT Take 1 Tab by mouth three (3) times daily as needed. metFORMIN 1,000 mg tablet Commonly known as:  GLUCOPHAGE Take 1 Tab by mouth two (2) times daily (with meals). OCUVITE ADULT 50+ PO Take  by mouth daily. Omega-3-DHA-EPA-Fish Oil 1,000 mg (120 mg-180 mg) Cap Take  by mouth daily. * ZOFRAN (AS HYDROCHLORIDE) 4 mg tablet Generic drug:  ondansetron hcl Take 4 mg by mouth every eight (8) hours as needed for Nausea. * ondansetron hcl 4 mg tablet Commonly known as:  ZOFRAN (AS HYDROCHLORIDE) Take 1 Tab by mouth every eight (8) hours as needed for Nausea. PARoxetine 40 mg tablet Commonly known as:  PAXIL Take 1 Tab by mouth daily. propranolol LA 60 mg SR capsule Commonly known as:  INDERAL LA Take 1 Cap by mouth daily. Indications: MIGRAINE PREVENTION SITagliptin 100 mg tablet Commonly known as:  Christa Repress Take 1 Tab by mouth daily. * Notice: This list has 2 medication(s) that are the same as other medications prescribed for you. Read the directions carefully, and ask your doctor or other care provider to review them with you. Introducing [facility]! [facility] introduces [facility] patient portal. Now you can access parts of your medical record, email your doctor's office, and request medication refills online. 1. In your internet browser, go to [email or web address] 2. Click on the First Time User? Click Here link in the Sign In box. You will see the New Member Sign Up page. 3. Enter your [facility] Access Code exactly as it appears below. You will not need to use this code after youve completed the sign-up process. If you do not sign up before the expiration date, you must request a new code. · [facility] Access Code: [email or web address] Expires: 9/24/2017 11:11 AM 
 
4. Enter the last four digits of your Social Security Number (xxxx) and Date of Birth (mm/dd/yyyy) as indicated and click Submit. You will be taken to the next sign-up page. 5. Create a [facility] ID. This will be your [facility] login ID and cannot be changed, so think of one that is secure and easy to remember. 6. Create a [facility] password. You can change your password at any time. 7. Enter your Password Reset Question and Answer. This can be used at a later time if you forget your password. 8. Enter your e-mail address. You will receive e-mail notification when new information is available in [address] 9. Click Sign Up. You can now view and download portions of your medical record. 10. Click the Download Summary menu link to download a portable copy of your medical information. If you have questions, please visit the Frequently Asked Questions section of the [facility] website. Remember, [facility] is NOT to be used for urgent needs. For medical emergencies, dial 911. Now available from your iPhone and Android! Please provide this summary of care documentation to your next provider. Your primary care clinician is listed as [address]. If you have any questions after today's visit, please call [phone].

## 2017-08-08 NOTE — PROGRESS NOTES
Jerry Pedraza is a 78 y.o. female who presents today with   Chief Complaint   Patient presents with    Hemorrhoids     Pt presents today c/o rectal bleeding after BM about 3 weeks ago. Last colonoscopy wast 12/12/2016                1. Have you been to the ER, urgent care clinic since your last visit? Hospitalized since your last visit? No    2. Have you seen or consulted any other health care providers outside of the [facility] since your last visit? Include any pap smears or colon screening.  No

## 2017-09-01 RX ORDER — ONDANSETRON 4 MG/1
4 TABLET, FILM COATED ORAL
Qty: 60 TAB | Refills: 1 | Status: SHIPPED | OUTPATIENT
Start: 2017-09-01 | End: 2017-11-02 | Stop reason: SDUPTHER

## 2017-09-19 ENCOUNTER — TELEPHONE (OUTPATIENT)
Dept: FAMILY MEDICINE CLINIC | Age: 67
End: 2017-09-19

## 2017-09-19 NOTE — TELEPHONE ENCOUNTER
Pt called this morning @ 7:05am wanting to see Dr Nayla Elkins today. I informed her that unfortunately Dr Nayla Elkins was booked for today and her next available was Tuesday 9/26 at 7:30am. Pt became very upset because she states she just came out of the hospital yesterday after her surgery and she is having a lot of problems. She states it's important that and she really needs to speak to Dr Nayla Elkins. Pt requesting to either be squeezed in today or talk to Dr Nayla Elkins, please advise.

## 2017-10-23 ENCOUNTER — OFFICE VISIT (OUTPATIENT)
Dept: FAMILY MEDICINE CLINIC | Age: 67
End: 2017-10-23

## 2017-10-23 VITALS
HEART RATE: 87 BPM | TEMPERATURE: 98 F | BODY MASS INDEX: 25.86 KG/M2 | RESPIRATION RATE: 16 BRPM | HEIGHT: 61 IN | SYSTOLIC BLOOD PRESSURE: 132 MMHG | WEIGHT: 137 LBS | DIASTOLIC BLOOD PRESSURE: 80 MMHG | OXYGEN SATURATION: 97 %

## 2017-10-23 DIAGNOSIS — M25.522 BILATERAL ELBOW JOINT PAIN: Primary | ICD-10-CM

## 2017-10-23 DIAGNOSIS — E03.9 ACQUIRED HYPOTHYROIDISM: ICD-10-CM

## 2017-10-23 DIAGNOSIS — M25.521 BILATERAL ELBOW JOINT PAIN: Primary | ICD-10-CM

## 2017-10-23 DIAGNOSIS — I10 ESSENTIAL HYPERTENSION: ICD-10-CM

## 2017-10-23 DIAGNOSIS — Z12.39 BREAST CANCER SCREENING: ICD-10-CM

## 2017-10-23 DIAGNOSIS — E55.9 HYPOVITAMINOSIS D: ICD-10-CM

## 2017-10-23 DIAGNOSIS — E11.42 TYPE 2 DIABETES MELLITUS WITH DIABETIC POLYNEUROPATHY, WITHOUT LONG-TERM CURRENT USE OF INSULIN (HCC): ICD-10-CM

## 2017-10-23 DIAGNOSIS — E78.00 PURE HYPERCHOLESTEROLEMIA: ICD-10-CM

## 2017-10-23 DIAGNOSIS — Z23 ENCOUNTER FOR IMMUNIZATION: ICD-10-CM

## 2017-10-23 RX ORDER — POLYETHYLENE GLYCOL 3350 17 G/17G
17 POWDER, FOR SOLUTION ORAL DAILY
COMMUNITY
End: 2018-08-20 | Stop reason: SDUPTHER

## 2017-10-23 RX ORDER — DOCUSATE SODIUM 100 MG/1
100 CAPSULE, LIQUID FILLED ORAL 2 TIMES DAILY
COMMUNITY
End: 2017-12-08 | Stop reason: SDUPTHER

## 2017-10-23 RX ORDER — ALBUTEROL SULFATE 90 UG/1
AEROSOL, METERED RESPIRATORY (INHALATION)
COMMUNITY
End: 2018-05-01 | Stop reason: SDUPTHER

## 2017-10-23 RX ORDER — LISINOPRIL 40 MG/1
40 TABLET ORAL DAILY
Qty: 90 TAB | Refills: 2 | Status: SHIPPED | OUTPATIENT
Start: 2017-10-23 | End: 2018-01-16 | Stop reason: SDUPTHER

## 2017-10-23 RX ORDER — GARLIC 1000 MG
CAPSULE ORAL
COMMUNITY
End: 2020-01-01 | Stop reason: ALTCHOICE

## 2017-10-23 RX ORDER — MULTIVITAMIN
TABLET ORAL
COMMUNITY
End: 2018-05-15 | Stop reason: ALTCHOICE

## 2017-10-23 NOTE — PROGRESS NOTES
Hailey Bautista is a 78 y.o. female (: 1950) presenting to address:    Chief Complaint   Patient presents with    Hypertension       Vitals:    10/23/17 1306   BP: 132/80   Pulse: 87   Resp: 16   Temp: 98 °F (36.7 °C)   TempSrc: Oral   SpO2: 97%   Weight: 137 lb (62.1 kg)   Height: 5' 1\" (1.549 m)   PainSc:   0 - No pain       Hearing/Vision:   No exam data present    Learning Assessment:     Learning Assessment 2016   PRIMARY LEARNER Patient   HIGHEST LEVEL OF EDUCATION - PRIMARY LEARNER  SOME COLLEGE   PRIMARY LANGUAGE ENGLISH   LEARNER PREFERENCE PRIMARY DEMONSTRATION   ANSWERED BY patient   RELATIONSHIP SELF     Depression Screening:     PHQ over the last two weeks 2016   Little interest or pleasure in doing things Several days   Feeling down, depressed or hopeless Several days   Total Score PHQ 2 2     Fall Risk Assessment:     Fall Risk Assessment, last 12 mths 2017   Able to walk? Yes   Fall in past 12 months? No   Fall with injury? -   Number of falls in past 12 months -   Fall Risk Score -     Abuse Screening:     Abuse Screening Questionnaire 10/23/2017   Do you ever feel afraid of your partner? N   Are you in a relationship with someone who physically or mentally threatens you? N   Is it safe for you to go home? Y     Coordination of Care Questionaire:   1. Have you been to the ER, urgent care clinic since your last visit? Hospitalized since your last visit? YES hospital for surgery     2. Have you seen or consulted any other health care providers outside of the [address] [place] [address] since your last visit? Include any pap smears or colon screening. NO    Advanced Directive:   1. Do you have an Advanced Directive? NO    2. Would you like information on Advanced Directives? NO      Flu shot Immunization/s administered 10/23/2017 by Regina Montano LPN with guardian's consent. Patient tolerated procedure well. No reactions noted.

## 2017-10-23 NOTE — MR AVS SNAPSHOT
Visit Information Date & Time Provider Department Dept. Phone Encounter #  
 10/23/2017  1:00 PM Candis Alves, 3 [facility] [phone] [id number] Upcoming Health Maintenance Date Due Pneumococcal 65+ Low/Medium Risk (1 of 2 - PCV13) 4/5/2016 BREAST CANCER SCRN MAMMOGRAM 3/10/2017 INFLUENZA AGE 9 TO ADULT 8/1/2017 FOOT EXAM Q1 8/25/2017 MICROALBUMIN Q1 8/25/2017 LIPID PANEL Q1 8/25/2017 HEMOGLOBIN A1C Q6M 12/6/2017 EYE EXAM RETINAL OR DILATED Q1 12/7/2017 MEDICARE YEARLY EXAM 1/19/2018 GLAUCOMA SCREENING Q2Y 12/7/2018 DTaP/Tdap/Td series (2 - Td) 8/11/2020 COLONOSCOPY 12/21/2021 Allergies as of 10/23/2017  Review Complete On: 10/23/2017 By: Candis Alves MD  
  
 Severity Noted Reaction Type Reactions Codeine  04/01/2016    Nausea and Vomiting Current Immunizations  Reviewed on 1/18/2017 Name Date Influenza High Dose Vaccine PF  Incomplete, 8/25/2016 11:56 AM  
 Influenza Vaccine 10/23/2015 Pneumococcal Polysaccharide (PPSV-23) 11/14/2014 Tdap 8/11/2010 Zoster Vaccine, Live 7/20/2011 Not reviewed this visit You Were Diagnosed With   
  
 Codes Comments Bilateral elbow joint pain    -  Primary ICD-10-CM: M25.521, M25.522 ICD-9-CM: 719.42 Essential hypertension     ICD-10-CM: I10 
ICD-9-CM: 401.9 Type 2 diabetes mellitus with diabetic polyneuropathy, without long-term current use of insulin (HCC)     ICD-10-CM: E11.42 
ICD-9-CM: 250.60, 357.2 Pure hypercholesterolemia     ICD-10-CM: E78.00 ICD-9-CM: 272.0 Acquired hypothyroidism     ICD-10-CM: E03.9 ICD-9-CM: 244.9 Hypovitaminosis D     ICD-10-CM: E55.9 ICD-9-CM: 268.9 Encounter for immunization     ICD-10-CM: S63 ICD-9-CM: V03.89 Breast cancer screening     ICD-10-CM: Z12.31 
ICD-9-CM: V76.10 Vitals BP Pulse Temp Resp Height(growth percentile) Weight(growth percentile) 132/80 (BP 1 Location: Left arm, BP Patient Position: Sitting) 87 98 °F (36.7 °C) (Oral) 16 5' 1\" (1.549 m) 137 lb (62.1 kg) SpO2 BMI OB Status Smoking Status 97% 25.89 kg/m2 Postmenopausal Never Smoker Vitals History BMI and BSA Data Body Mass Index Body Surface Area  
 25.89 kg/m 2 1.63 m 2 Preferred Pharmacy Pharmacy Name Phone [address], [place] [phone] Your Updated Medication List  
  
   
This list is accurate as of: 10/23/17  1:49 PM.  Always use your most recent med list.  
  
  
  
  
 albuterol 90 mcg/actuation inhaler Commonly known as:  PROVENTIL HFA, VENTOLIN HFA, PROAIR HFA Take  by inhalation. atorvastatin 10 mg tablet Commonly known as:  LIPITOR Take 1 Tab by mouth daily. chlorthalidone 25 mg tablet Commonly known as:  Beckey Severance Take 1 Tab by mouth daily. CINNAMON 500 mg Cap Generic drug:  cinnamon bark Take  by mouth. docusate sodium 100 mg capsule Commonly known as:  Ethyl Hoit Take 100 mg by mouth two (2) times a day. fenofibrate 54 mg tablet Commonly known as:  LOFIBRA Take 1 Tab by mouth daily. fluticasone-salmeterol 100-50 mcg/dose diskus inhaler Commonly known as:  ADVAIR Take 1 Puff by inhalation every twelve (12) hours. gabapentin 100 mg capsule Commonly known as:  NEURONTIN Take 1 Cap by mouth three (3) times daily. garlic 5,981 mg Cap Take  by mouth. GINGER EXTRACT 250 mg Cap Generic drug:  ginger (Zingiber officinalis) Take  by mouth.  
  
 levothyroxine 50 mcg tablet Commonly known as:  SYNTHROID Take 1 Tab by mouth Daily (before breakfast). lisinopril 40 mg tablet Commonly known as:  Grace November Take 1 Tab by mouth daily. meclizine 25 mg tablet Commonly known as:  ANTIVERT Take 1 Tab by mouth three (3) times daily as needed. metFORMIN 1,000 mg tablet Commonly known as:  GLUCOPHAGE Take 1 Tab by mouth two (2) times daily (with meals). MIRALAX 17 gram packet Generic drug:  polyethylene glycol Take 17 g by mouth daily. OCUVITE ADULT 50+ PO Take  by mouth daily. Omega-3-DHA-EPA-Fish Oil 1,000 mg (120 mg-180 mg) Cap Take  by mouth daily. * ondansetron hcl 4 mg tablet Commonly known as:  ZOFRAN (AS HYDROCHLORIDE) Take 1 Tab by mouth every eight (8) hours as needed for Nausea. * ondansetron hcl 4 mg tablet Commonly known as:  ZOFRAN (AS HYDROCHLORIDE) Take 1 Tab by mouth every eight (8) hours as needed for Nausea. PARoxetine 40 mg tablet Commonly known as:  PAXIL Take 1 Tab by mouth daily. propranolol LA 60 mg SR capsule Commonly known as:  INDERAL LA Take 1 Cap by mouth daily. Indications: MIGRAINE PREVENTION SITagliptin 100 mg tablet Commonly known as:  Aminata [place] Take 1 Tab by mouth daily. * Notice: This list has 2 medication(s) that are the same as other medications prescribed for you. Read the directions carefully, and ask your doctor or other care provider to review them with you. Prescriptions Sent to Pharmacy Refills  
 lisinopril (PRINIVIL, ZESTRIL) 40 mg tablet 2 Sig: Take 1 Tab by mouth daily. Class: Normal  
 Pharmacy: [address] [facility] [address] [facility], [place] Ph #: [phone] Route: Oral  
  
We Performed the Following ADMIN INFLUENZA VIRUS VAC [ [facility]]  DIABETES FOOT EXAM [HM7 Custom] INFLUENZA VIRUS VACCINE, HIGH DOSE SEASONAL, PRESERVATIVE FREE [84593 CPT(R)] REFERRAL TO SPORTS MEDICINE [RQG409 Custom] To-Do List   
 10/23/2017 Lab:  CBC WITH AUTOMATED DIFF   
  
 10/23/2017 Lab:  HEMOGLOBIN A1C W/O EAG   
  
 10/23/2017 Lab:  HEPATIC FUNCTION PANEL   
  
 10/23/2017 Lab:  LIPID PANEL   
  
 10/23/2017   Imaging:  [facility] MAMMO BI SCREENING INCL CAD   
  
 10/23/2017 Lab:  METABOLIC PANEL, BASIC   
  
 10/23/2017 Lab:  MICROALBUMIN, UR, RAND W/ MICROALBUMIN/CREA RATIO   
  
 10/23/2017 Lab:  T4, FREE   
  
 10/23/2017 Lab:  TSH 3RD GENERATION   
  
 10/23/2017 Lab:  URINALYSIS W/ RFLX MICROSCOPIC   
  
 10/23/2017 Lab:  VITAMIN D, 25 HYDROXY   
  
 10/23/2017 Imaging:  XR ELBOW LT MIN 3 V   
  
 10/23/2017 Imaging:  XR ELBOW RT MIN 3 V Referral Information Referral ID Referred By Referred To  
  
 [id number] Rina Meyers[address] [employer] [place], [address] [facility] [address] Phone: [phone] Fax: [phone] Visits Status Start Date End Date 1 New Request 10/23/17 10/23/18 If your referral has a status of pending review or denied, additional information will be sent to support the outcome of this decision. Patient Instructions Learning About Diabetes Food Guidelines Your Care Instructions Meal planning is important to manage diabetes. It helps keep your blood sugar at a target level (which you set with your doctor). You don't have to eat special foods. You can eat what your family eats, including sweets once in a while. But you do have to pay attention to how often you eat and how much you eat of certain foods. You may want to work with a dietitian or a certified diabetes educator (CDE) to help you plan meals and snacks. A dietitian or CDE can also help you lose weight if that is one of your goals. What should you know about eating carbs? Managing the amount of carbohydrate (carbs) you eat is an important part of healthy meals when you have diabetes. Carbohydrate is found in many foods. · Learn which foods have carbs. And learn the amounts of carbs in different foods. ¨ Bread, cereal, pasta, and rice have about 15 grams of carbs in a serving.  A serving is 1 slice of bread (1 ounce), ½ cup of cooked cereal, or 1/3 cup of cooked pasta or rice. ¨ Fruits have 15 grams of carbs in a serving. A serving is 1 small fresh fruit, such as an apple or orange; ½ of a banana; ½ cup of cooked or canned fruit; ½ cup of fruit juice; 1 cup of melon or raspberries; or 2 tablespoons of dried fruit. ¨ Milk and no-sugar-added yogurt have 15 grams of carbs in a serving. A serving is 1 cup of milk or 2/3 cup of no-sugar-added yogurt. ¨ Starchy vegetables have 15 grams of carbs in a serving. A serving is ½ cup of mashed potatoes or sweet potato; 1 cup winter squash; ½ of a small baked potato; ½ cup of cooked beans; or ½ cup cooked corn or green peas. · Learn how much carbs to eat each day and at each meal. A dietitian or CDE can teach you how to keep track of the amount of carbs you eat. This is called carbohydrate counting. · If you are not sure how to count carbohydrate grams, use the Plate Method to plan meals. It is a good, quick way to make sure that you have a balanced meal. It also helps you spread carbs throughout the day. ¨ Divide your plate by types of foods. Put non-starchy vegetables on half the plate, meat or other protein food on one-quarter of the plate, and a grain or starchy vegetable in the final quarter of the plate. To this you can add a small piece of fruit and 1 cup of milk or yogurt, depending on how many carbs you are supposed to eat at a meal. 
· Try to eat about the same amount of carbs at each meal. Do not \"save up\" your daily allowance of carbs to eat at one meal. 
· Proteins have very little or no carbs per serving. Examples of proteins are beef, chicken, turkey, fish, eggs, tofu, cheese, cottage cheese, and peanut butter. A serving size of meat is 3 ounces, which is about the size of a deck of cards. Examples of meat substitute serving sizes (equal to 1 ounce of meat) are 1/4 cup of cottage cheese, 1 egg, 1 tablespoon of peanut butter, and ½ cup of tofu. How can you eat out and still eat healthy? · Learn to estimate the serving sizes of foods that have carbohydrate. If you measure food at home, it will be easier to estimate the amount in a serving of restaurant food. · If the meal you order has too much carbohydrate (such as potatoes, corn, or baked beans), ask to have a low-carbohydrate food instead. Ask for a salad or green vegetables. · If you use insulin, check your blood sugar before and after eating out to help you plan how much to eat in the future. · If you eat more carbohydrate at a meal than you had planned, take a walk or do other exercise. This will help lower your blood sugar. What else should you know? · Limit saturated fat, such as the fat from meat and dairy products. This is a healthy choice because people who have diabetes are at higher risk of heart disease. So choose lean cuts of meat and nonfat or low-fat dairy products. Use olive or canola oil instead of butter or shortening when cooking. · Don't skip meals. Your blood sugar may drop too low if you skip meals and take insulin or certain medicines for diabetes. · Check with your doctor before you drink alcohol. Alcohol can cause your blood sugar to drop too low. Alcohol can also cause a bad reaction if you take certain diabetes medicines. Follow-up care is a key part of your treatment and safety. Be sure to make and go to all appointments, and call your doctor if you are having problems. It's also a good idea to know your test results and keep a list of the medicines you take. Where can you learn more? Go to http://bertha-shayla.info/. Enter D230 in the search box to learn more about \"Learning About Diabetes Food Guidelines. \" Current as of: March 13, 2017 Content Version: 11.3 © 8293-1693 [employer]. Care instructions adapted under license by [employer] (which disclaims liability or warranty for this information).  If you have questions about a medical condition or this instruction, always ask your healthcare professional. [address]  any warranty or liability for your use of this information. Introducing [facility] [place] [facility]! [facility] introduces Miew patient portal. Now you can access parts of your medical record, email your doctor's office, and request medication refills online. 1. In your internet browser, go to [email or web address] 2. Click on the First Time User? Click Here link in the Sign In box. You will see the New Member Sign Up page. 3. Enter your Miew Access Code exactly as it appears below. You will not need to use this code after youve completed the sign-up process. If you do not sign up before the expiration date, you must request a new code. · Miew Access Code: [email or web address] Expires: 1/21/2018  1:47 PM 
 
4. Enter the last four digits of your Social Security Number (xxxx) and Date of Birth (mm/dd/yyyy) as indicated and click Submit. You will be taken to the next sign-up page. 5. Create a Miew ID. This will be your Miew login ID and cannot be changed, so think of one that is secure and easy to remember. 6. Create a Miew password. You can change your password at any time. 7. Enter your Password Reset Question and Answer. This can be used at a later time if you forget your password. 8. Enter your e-mail address. You will receive e-mail notification when new information is available in [address]. 9. Click Sign Up. You can now view and download portions of your medical record. 10. Click the Download Summary menu link to download a portable copy of your medical information. If you have questions, please visit the Frequently Asked Questions section of the Miew website. Remember, Miew is NOT to be used for urgent needs. For medical emergencies, dial 911. Now available from your iPhone and Android! Please provide this summary of care documentation to your next provider. Your primary care clinician is listed as Hector 51. If you have any questions after today's visit, please call [phone].

## 2017-10-23 NOTE — PATIENT INSTRUCTIONS

## 2017-10-23 NOTE — PROGRESS NOTES
Assessment/Plan:    *Diagnoses and all orders for this visit:    1. Bilateral elbow joint pain  -     Formerly named [facility]  -     XR ELBOW LT MIN 3 V; Future  -     XR ELBOW RT MIN 3 V; Future    2. Essential hypertension  -     lisinopril (PRINIVIL, ZESTRIL) 40 mg tablet; Take 1 Tab by mouth daily. 3. Type 2 diabetes mellitus with diabetic polyneuropathy, without long-term current use of insulin (HCC)  -      DIABETES FOOT EXAM  -     CBC WITH AUTOMATED DIFF; Future  -     HEPATIC FUNCTION PANEL; Future  -     LIPID PANEL; Future  -     METABOLIC PANEL, BASIC; Future  -     TSH 3RD GENERATION; Future  -     T4, FREE; Future  -     URINALYSIS W/ RFLX MICROSCOPIC; Future  -     HEMOGLOBIN A1C W/O EAG; Future  -     MICROALBUMIN, UR, RAND W/ MICROALBUMIN/CREA RATIO; Future    4. Pure hypercholesterolemia  -     LIPID PANEL; Future    5. Acquired hypothyroidism  -     TSH 3RD GENERATION; Future  -     T4, FREE; Future    6. Hypovitaminosis D  -     VITAMIN D, 25 HYDROXY; Future    7. Encounter for immunization  -     Influenza virus vaccine ([address]) 72 years and older ([address]54)  -     Administration fee () for Medicare insured patients    8. Breast cancer screening  -     [facility] MAMMO BI SCREENING INCL CAD; Future        Pt still has not done DEXA. Re-printed the order for her. Pt thinks she had mammo in 2016 at [facility]. Release signed. Labs tomorrow. [address] in Jan 2018. The plan was discussed with the patient. The patient verbalized understanding and is in agreement with the plan. All medication potential side effects were discussed with the patient.    -------------------------------------------------------------------------------------------------------------------        Najma Hairston is a 78 y.o. female and presents with Hypertension         Subjective:  Here for f/u. Since last seeing her, has had umbilical hernia repair with Dr. Eliana Forrest. She is doing well.     Has developed a repeat issue with her elbows. Roughly 3 years ago, she recalls having LT elbow pain, was seen at [facility] and had \"an injection\" which helped greatly. things have been fine but in recent weeks, having pain in both, LT>RT. DM: stable on last BW. Needs repeat labs. HTN: well controlled. Needs refill on Lisinopril. Does not want to call for it herself b/c then she needs to wait 3 days and she is out. ROS:  Constitutional: No recent weight change. No weakness/fatigue. No f/c. Skin: No rashes, change in nails/hair, itching   HENT: No HA, dizziness. No hearing loss/tinnitus. No nasal congestion/discharge. Eyes: No change in vision, double/blurred vision or eye pain/redness. Cardiovascular: No CP/palpitations. No CRABTREE/orthopnea/PND. Respiratory: No cough/sputum, dyspnea, wheezing. Gastointestinal: No dysphagia, reflux. No n/v. No constipation/diarrhea. No melena/rectal bleeding. Genitourinary: No dysuria, urinary hesitancy, nocturia, hematuria. No incontinence. Musculoskeletal: No joint pain/stiffness. No muscle pain/tenderness. Endo: No heat/cold intolerance, no polyuria/polydypsia. Heme: No h/o anemia. No easy bleeding/bruising. Allergy/Immunology: No seasonal rhinitis. Denies frequent colds, sinus/ear infections. Neurological: No seizures/numbness/weakness. No paresthesias. Psychiatric:  No depression, anxiety. The problem list was updated as a part of today's visit. Patient Active Problem List   Diagnosis Code    Hypertension I10    Diabetes ([address] E11.9    GERD (gastroesophageal reflux disease) K21.9    Depression F32.9    HLD (hyperlipidemia) E78.5    Acquired hypothyroidism E03.9    Dizziness R42    Asthma J45.909    Sleep disturbance G47.9       The PSH, FH were reviewed.         SH:  Social History   Substance Use Topics    Smoking status: Never Smoker    Smokeless tobacco: Never Used    Alcohol use Yes       Medications/Allergies:  Current Outpatient Prescriptions on File Prior to Visit   Medication Sig Dispense Refill    propranolol LA (INDERAL LA) 60 mg SR capsule Take 1 Cap by mouth daily. Indications: MIGRAINE PREVENTION 90 Cap 2    levothyroxine (SYNTHROID) 50 mcg tablet Take 1 Tab by mouth Daily (before breakfast). 90 Tab 2    chlorthalidone (HYGROTEN) 25 mg tablet Take 1 Tab by mouth daily. 90 Tab 1    gabapentin (NEURONTIN) 100 mg capsule Take 1 Cap by mouth three (3) times daily. 270 Cap 2    meclizine (ANTIVERT) 25 mg tablet Take 1 Tab by mouth three (3) times daily as needed. 60 Tab 1    metFORMIN (GLUCOPHAGE) 1,000 mg tablet Take 1 Tab by mouth two (2) times daily (with meals). (Patient taking differently: Take 88 mg by mouth two (2) times daily (with meals). ) 180 Tab 1    atorvastatin (LIPITOR) 10 mg tablet Take 1 Tab by mouth daily. 90 Tab 2    fenofibrate (LOFIBRA) 54 mg tablet Take 1 Tab by mouth daily. 90 Tab 2    SITagliptin (JANUVIA) 100 mg tablet Take 1 Tab by mouth daily. 90 Tab 2    PARoxetine (PAXIL) 40 mg tablet Take 1 Tab by mouth daily. 90 Tab 2    fluticasone-salmeterol (ADVAIR) 100-50 mcg/dose diskus inhaler Take 1 Puff by inhalation every twelve (12) hours. 3 Inhaler 2    Omega-3-DHA-EPA-Fish Oil 1,000 mg (120 mg-180 mg) cap Take  by mouth daily.  ANTIOX #11/OM3/DHA/EPA/LUT/SHASHANK (OCUVITE ADULT 50+ PO) Take  by mouth daily.  ondansetron hcl (ZOFRAN, AS HYDROCHLORIDE,) 4 mg tablet Take 1 Tab by mouth every eight (8) hours as needed for Nausea. 60 Tab 1    ondansetron hcl (ZOFRAN, AS HYDROCHLORIDE,) 4 mg tablet Take 1 Tab by mouth every eight (8) hours as needed for Nausea. 60 Tab 1     No current facility-administered medications on file prior to visit.          Allergies   Allergen Reactions    Codeine Nausea and Vomiting         Health Maintenance:   Health Maintenance   Topic Date Due    Pneumococcal 65+ Low/Medium Risk (1 of 2 - PCV13) 04/05/2016    BREAST CANCER SCRN MAMMOGRAM  03/10/2017    INFLUENZA AGE 9 TO ADULT  08/01/2017    FOOT EXAM Q1  08/25/2017    MICROALBUMIN Q1  08/25/2017    LIPID PANEL Q1  08/25/2017    HEMOGLOBIN A1C Q6M  12/06/2017    EYE EXAM RETINAL OR DILATED Q1  12/07/2017    MEDICARE YEARLY EXAM  01/19/2018    GLAUCOMA SCREENING Q2Y  12/07/2018    DTaP/Tdap/Td series (2 - Td) 08/11/2020    COLONOSCOPY  12/21/2021    Hepatitis C Screening  Completed    OSTEOPOROSIS SCREENING (DEXA)  Completed    ZOSTER VACCINE AGE 60>  Completed       Objective:  Visit Vitals    /80 (BP 1 Location: Left arm, BP Patient Position: Sitting)    Pulse 87    Temp 98 °F (36.7 °C) (Oral)    Resp 16    Ht 5' 1\" (1.549 m)    Wt 137 lb (62.1 kg)    SpO2 97%    BMI 25.89 kg/m2          Nurses notes and VS reviewed.       Physical Examination: General appearance - alert, well appearing, and in no distress  Chest - clear to auscultation, no wheezes, rales or rhonchi, symmetric air entry  Heart - normal rate, regular rhythm, normal S1, S2, no murmurs, rubs, clicks or gallops  Abdomen - soft, nontender, nondistended, no masses or organomegaly  Diabetic foot exam:     Left:    Filament test normal sensation with micro filament   Pulse DP: 2+ (normal)   Pulse PT: 2+ (normal)   Deformities: None  Right:    Filament test normal sensation with micro filament   Pulse DP: 2+ (normal)   Pulse PT: 2+ (normal)   Deformities: None        Labwork and Ancillary Studies:    CBC w/Diff  Lab Results   Component Value Date/Time    WBC 10.4 08/25/2016 12:07 PM    HGB 11.9 08/25/2016 12:07 PM    PLATELET 425 83/85/9409 12:07 PM         Basic Metabolic Profile  Lab Results   Component Value Date/Time    Sodium 139 08/25/2016 12:07 PM    Potassium 3.4 08/25/2016 12:07 PM    Chloride 101 08/25/2016 12:07 PM    CO2 27 08/25/2016 12:07 PM    Anion gap 11 08/25/2016 12:07 PM    Glucose 178 08/25/2016 12:07 PM    BUN 7 08/25/2016 12:07 PM    Creatinine 0.62 08/25/2016 12:07 PM    BUN/Creatinine ratio 11 08/25/2016 12:07 PM    GFR est AA >60 08/25/2016 12:07 PM    GFR est non-AA >60 08/25/2016 12:07 PM    Calcium 8.9 08/25/2016 12:07 PM         LFT  Lab Results   Component Value Date/Time    ALT (SGPT) 17 08/25/2016 12:07 PM    AST (SGOT) 14 08/25/2016 12:07 PM    Alk.  phosphatase 53 08/25/2016 12:07 PM    Bilirubin, direct 0.2 08/25/2016 12:07 PM    Bilirubin, total 0.5 08/25/2016 12:07 PM         Cholesterol  Lab Results   Component Value Date/Time    Cholesterol, total 146 08/25/2016 12:07 PM    HDL Cholesterol 42 08/25/2016 12:07 PM    LDL, calculated 75.2 08/25/2016 12:07 PM    Triglyceride 144 08/25/2016 12:07 PM    CHOL/HDL Ratio 3.5 08/25/2016 12:07 PM

## 2017-10-24 ENCOUNTER — HOSPITAL ENCOUNTER (OUTPATIENT)
Dept: LAB | Age: 67
Discharge: HOME | End: 2017-10-24
Payer: MEDICARE

## 2017-10-24 DIAGNOSIS — E03.9 ACQUIRED HYPOTHYROIDISM: ICD-10-CM

## 2017-10-24 DIAGNOSIS — E55.9 HYPOVITAMINOSIS D: ICD-10-CM

## 2017-10-24 DIAGNOSIS — E78.00 PURE HYPERCHOLESTEROLEMIA: ICD-10-CM

## 2017-10-24 DIAGNOSIS — E11.42 TYPE 2 DIABETES MELLITUS WITH DIABETIC POLYNEUROPATHY, WITHOUT LONG-TERM CURRENT USE OF INSULIN (HCC): ICD-10-CM

## 2017-10-24 LAB
25(OH)D3 SERPL-MCNC: 36.8 NG/ML (ref 30–100)
ALBUMIN SERPL-MCNC: 3.8 G/DL (ref 3.4–5)
ALBUMIN/GLOB SERPL: 1.2 {RATIO} (ref 0.8–1.7)
ALP SERPL-CCNC: 45 U/L (ref 45–117)
ALT SERPL-CCNC: 19 U/L (ref 13–56)
ANION GAP SERPL CALC-SCNC: 12 MMOL/L (ref 3–18)
APPEARANCE UR: CLEAR
AST SERPL-CCNC: 11 U/L (ref 15–37)
BACTERIA URNS QL MICRO: NEGATIVE /HPF
BASOPHILS # BLD: 0 K/UL (ref 0–0.06)
BASOPHILS NFR BLD: 0 % (ref 0–2)
BILIRUB DIRECT SERPL-MCNC: 0.1 MG/DL (ref 0–0.2)
BILIRUB SERPL-MCNC: 0.3 MG/DL (ref 0.2–1)
BILIRUB UR QL: NEGATIVE
BUN SERPL-MCNC: 10 MG/DL (ref 7–18)
BUN/CREAT SERPL: 15 (ref 12–20)
CALCIUM SERPL-MCNC: 9.2 MG/DL (ref 8.5–10.1)
CHLORIDE SERPL-SCNC: 97 MMOL/L (ref 100–108)
CHOLEST SERPL-MCNC: 132 MG/DL
CO2 SERPL-SCNC: 27 MMOL/L (ref 21–32)
COLOR UR: YELLOW
CREAT SERPL-MCNC: 0.66 MG/DL (ref 0.6–1.3)
CREAT UR-MCNC: 87.76 MG/DL (ref 30–125)
DIFFERENTIAL METHOD BLD: ABNORMAL
EOSINOPHIL # BLD: 0.3 K/UL (ref 0–0.4)
EOSINOPHIL NFR BLD: 4 % (ref 0–5)
EPITH CASTS URNS QL MICRO: NORMAL /LPF (ref 0–5)
ERYTHROCYTE [DISTWIDTH] IN BLOOD BY AUTOMATED COUNT: 12.8 % (ref 11.6–14.5)
GLOBULIN SER CALC-MCNC: 3.1 G/DL (ref 2–4)
GLUCOSE SERPL-MCNC: 216 MG/DL (ref 74–99)
GLUCOSE UR STRIP.AUTO-MCNC: NEGATIVE MG/DL
HBA1C MFR BLD: 6.3 % (ref 4.2–5.6)
HCT VFR BLD AUTO: 34 % (ref 35–45)
HDLC SERPL-MCNC: 45 MG/DL (ref 40–60)
HDLC SERPL: 2.9 {RATIO} (ref 0–5)
HGB BLD-MCNC: 11.2 G/DL (ref 12–16)
HGB UR QL STRIP: NEGATIVE
KETONES UR QL STRIP.AUTO: NEGATIVE MG/DL
LDLC SERPL CALC-MCNC: 64.2 MG/DL (ref 0–100)
LEUKOCYTE ESTERASE UR QL STRIP.AUTO: ABNORMAL
LIPID PROFILE,FLP: NORMAL
LYMPHOCYTES # BLD: 1.2 K/UL (ref 0.9–3.6)
LYMPHOCYTES NFR BLD: 14 % (ref 21–52)
MCH RBC QN AUTO: 29.6 PG (ref 24–34)
MCHC RBC AUTO-ENTMCNC: 32.9 G/DL (ref 31–37)
MCV RBC AUTO: 89.7 FL (ref 74–97)
MICROALBUMIN UR-MCNC: 1.7 MG/DL (ref 0–3)
MICROALBUMIN/CREAT UR-RTO: 19 MG/G (ref 0–30)
MONOCYTES # BLD: 0.6 K/UL (ref 0.05–1.2)
MONOCYTES NFR BLD: 7 % (ref 3–10)
NEUTS SEG # BLD: 6.4 K/UL (ref 1.8–8)
NEUTS SEG NFR BLD: 75 % (ref 40–73)
NITRITE UR QL STRIP.AUTO: NEGATIVE
PH UR STRIP: 6.5 [PH] (ref 5–8)
PLATELET # BLD AUTO: 148 K/UL (ref 135–420)
PMV BLD AUTO: 11.9 FL (ref 9.2–11.8)
POTASSIUM SERPL-SCNC: 3.4 MMOL/L (ref 3.5–5.5)
PROT SERPL-MCNC: 6.9 G/DL (ref 6.4–8.2)
PROT UR STRIP-MCNC: NEGATIVE MG/DL
RBC # BLD AUTO: 3.79 M/UL (ref 4.2–5.3)
RBC #/AREA URNS HPF: 0 /HPF (ref 0–5)
SODIUM SERPL-SCNC: 136 MMOL/L (ref 136–145)
SP GR UR REFRACTOMETRY: 1.01 (ref 1–1.03)
T4 FREE SERPL-MCNC: 1.2 NG/DL (ref 0.7–1.5)
TRIGL SERPL-MCNC: 114 MG/DL (ref ?–150)
TSH SERPL DL<=0.05 MIU/L-ACNC: 1.49 UIU/ML (ref 0.36–3.74)
UROBILINOGEN UR QL STRIP.AUTO: 0.2 EU/DL (ref 0.2–1)
VLDLC SERPL CALC-MCNC: 22.8 MG/DL
WBC # BLD AUTO: 8.5 K/UL (ref 4.6–13.2)
WBC URNS QL MICRO: NORMAL /HPF (ref 0–4)

## 2017-10-24 PROCEDURE — 80061 LIPID PANEL: CPT | Performed by: INTERNAL MEDICINE

## 2017-10-24 PROCEDURE — 81001 URINALYSIS AUTO W/SCOPE: CPT | Performed by: INTERNAL MEDICINE

## 2017-10-24 PROCEDURE — 80048 BASIC METABOLIC PNL TOTAL CA: CPT | Performed by: INTERNAL MEDICINE

## 2017-10-24 PROCEDURE — 85025 COMPLETE CBC W/AUTO DIFF WBC: CPT | Performed by: INTERNAL MEDICINE

## 2017-10-24 PROCEDURE — 83036 HEMOGLOBIN GLYCOSYLATED A1C: CPT | Performed by: INTERNAL MEDICINE

## 2017-10-24 PROCEDURE — 82306 VITAMIN D 25 HYDROXY: CPT | Performed by: INTERNAL MEDICINE

## 2017-10-24 PROCEDURE — 84443 ASSAY THYROID STIM HORMONE: CPT | Performed by: INTERNAL MEDICINE

## 2017-10-24 PROCEDURE — 80076 HEPATIC FUNCTION PANEL: CPT | Performed by: INTERNAL MEDICINE

## 2017-10-24 PROCEDURE — 36415 COLL VENOUS BLD VENIPUNCTURE: CPT | Performed by: INTERNAL MEDICINE

## 2017-10-24 PROCEDURE — 84439 ASSAY OF FREE THYROXINE: CPT | Performed by: INTERNAL MEDICINE

## 2017-10-24 PROCEDURE — 82043 UR ALBUMIN QUANTITATIVE: CPT | Performed by: INTERNAL MEDICINE

## 2017-10-27 RX ORDER — CHLORTHALIDONE 25 MG/1
25 TABLET ORAL DAILY
Qty: 90 TAB | Refills: 1 | Status: SHIPPED | OUTPATIENT
Start: 2017-10-27 | End: 2017-12-08 | Stop reason: SDUPTHER

## 2017-10-27 RX ORDER — POTASSIUM CHLORIDE 750 MG/1
10 TABLET, EXTENDED RELEASE ORAL DAILY
Qty: 90 TAB | Refills: 1 | Status: SHIPPED | OUTPATIENT
Start: 2017-10-27 | End: 2018-01-30 | Stop reason: SDUPTHER

## 2017-10-27 NOTE — TELEPHONE ENCOUNTER
Patient notified of lab results and need for potassium please submit also needs a refill on chlorthalidone last refilled 6/26/2017  for 90 with one refill .

## 2017-11-02 ENCOUNTER — OFFICE VISIT (OUTPATIENT)
Dept: FAMILY MEDICINE CLINIC | Age: 67
End: 2017-11-02

## 2017-11-02 VITALS
RESPIRATION RATE: 18 BRPM | DIASTOLIC BLOOD PRESSURE: 67 MMHG | BODY MASS INDEX: 25.45 KG/M2 | HEIGHT: 61 IN | OXYGEN SATURATION: 99 % | WEIGHT: 134.8 LBS | HEART RATE: 80 BPM | TEMPERATURE: 97.5 F | SYSTOLIC BLOOD PRESSURE: 134 MMHG

## 2017-11-02 DIAGNOSIS — M77.02 BILATERAL MEDIAL EPICONDYLITIS OF ELBOW JOINT: ICD-10-CM

## 2017-11-02 DIAGNOSIS — M77.11 LATERAL EPICONDYLITIS OF BOTH ELBOWS: Primary | ICD-10-CM

## 2017-11-02 DIAGNOSIS — E11.8 TYPE 2 DIABETES MELLITUS WITH COMPLICATION, WITHOUT LONG-TERM CURRENT USE OF INSULIN (HCC): ICD-10-CM

## 2017-11-02 DIAGNOSIS — M67.912 DYSFUNCTION OF ROTATOR CUFF OF BOTH SHOULDERS: ICD-10-CM

## 2017-11-02 DIAGNOSIS — M77.01 BILATERAL MEDIAL EPICONDYLITIS OF ELBOW JOINT: ICD-10-CM

## 2017-11-02 DIAGNOSIS — M67.911 DYSFUNCTION OF ROTATOR CUFF OF BOTH SHOULDERS: ICD-10-CM

## 2017-11-02 DIAGNOSIS — M77.12 LATERAL EPICONDYLITIS OF BOTH ELBOWS: Primary | ICD-10-CM

## 2017-11-02 RX ORDER — DICLOFENAC SODIUM 10 MG/G
GEL TOPICAL 4 TIMES DAILY
Qty: 100 G | Refills: 1 | Status: SHIPPED | OUTPATIENT
Start: 2017-11-02 | End: 2018-05-15 | Stop reason: ALTCHOICE

## 2017-11-02 NOTE — PATIENT INSTRUCTIONS
To Do: · Start with the exercises for your shoulders & elbows  · Okay to use the acetaminophen (tylenol) and the voltaren gel on the painful areas as needed  · If really painful, can also use ice / frozen veggies as needed for 20min on the painful areas    Notes from your doctor:  · If still not better, please call and let me know. Other options include:  · Steroid injections (although would prefer to avoid as you have diabetes, and this can raise your blood sugar)  · Formal physical therapy           Rotator Cuff: Exercises  Your Care Instructions  Here are some examples of typical rehabilitation exercises for your condition. Start each exercise slowly. Ease off the exercise if you start to have pain. Your doctor or physical therapist will tell you when you can start these exercises and which ones will work best for you. How to do the exercises  Pendulum swing    If you have pain in your back, do not do this exercise. 4. Hold on to a table or the back of a chair with your good arm. Then bend forward a little and let your sore arm hang straight down. This exercise does not use the arm muscles. Rather, use your legs and your hips to create movement that makes your arm swing freely. 5. Use the movement from your hips and legs to guide the slightly swinging arm back and forth like a pendulum (or elephant trunk). Then guide it in circles that start small (about the size of a dinner plate). Make the circles a bit larger each day, as your pain allows. 6. Do this exercise for 5 minutes, 5 to 7 times each day. 7. As you have less pain, try bending over a little farther to do this exercise. This will increase the amount of movement at your shoulder. Posterior stretching exercise    1. Hold the elbow of your injured arm with your other hand. 2. Use your hand to pull your injured arm gently up and across your body. You will feel a gentle stretch across the back of your injured shoulder.   3. Hold for at least 15 to 30 seconds. Then slowly lower your arm. 4. Repeat 2 to 4 times. Up-the-back stretch    Your doctor or physical therapist may want you to wait to do this stretch until you have regained most of your range of motion and strength. You can do this stretch in different ways. Hold any of these stretches for at least 15 to 30 seconds. Repeat them 2 to 4 times. 2. Put your hand in your back pocket. Let it rest there to stretch your shoulder. 3. With your other hand, hold your injured arm (palm outward) behind your back by the wrist. Pull your arm up gently to stretch your shoulder. 4. Next, put a towel over your other shoulder. Put the hand of your injured arm behind your back. Now hold the back end of the towel. With the other hand, hold the front end of the towel in front of your body. Pull gently on the front end of the towel. This will bring your hand farther up your back to stretch your shoulder. Overhead stretch    1. Standing about an arm's length away, grasp onto a solid surface. You could use a countertop, a doorknob, or the back of a sturdy chair. 2. With your knees slightly bent, bend forward with your arms straight. Lower your upper body, and let your shoulders stretch. 3. As your shoulders are able to stretch farther, you may need to take a step or two backward. 4. Hold for at least 15 to 30 seconds. Then stand up and relax. If you had stepped back during your stretch, step forward so you can keep your hands on the solid surface. 5. Repeat 2 to 4 times. Shoulder flexion (lying down)    To make a wand for this exercise, use a piece of PVC pipe or a broom handle with the broom removed. Make the wand about a foot wider than your shoulders. 1. Lie on your back, holding a wand with both hands. Your palms should face down as you hold the wand. 2. Keeping your elbows straight, slowly raise your arms over your head.  Raise them until you feel a stretch in your shoulders, upper back, and chest.  3. Hold for 15 to 30 seconds. 4. Repeat 2 to 4 times. Shoulder rotation (lying down)    To make a wand for this exercise, use a piece of PVC pipe or a broom handle with the broom removed. Make the wand about a foot wider than your shoulders. 1. Lie on your back. Hold a wand with both hands with your elbows bent and palms up. 2. Keep your elbows close to your body, and move the wand across your body toward the sore arm. 3. Hold for 8 to 12 seconds. 4. Repeat 2 to 4 times. Wall climbing (to the side)    Avoid any movement that is straight to your side, and be careful not to arch your back. Your arm should stay about 30 degrees to the front of your side. 1. Stand with your side to a wall so that your fingers can just touch it at an angle about 30 degrees toward the front of your body. 2. Walk the fingers of your injured arm up the wall as high as pain permits. Try not to shrug your shoulder up toward your ear as you move your arm up. 3. Hold that position for a count of at least 15 to 20.  4. Walk your fingers back down to the starting position. 5. Repeat at least 2 to 4 times. Try to reach higher each time. Wall climbing (to the front)    During this stretching exercise, be careful not to arch your back. 1. Face a wall, and stand so your fingers can just touch it. 2. Keeping your shoulder down, walk the fingers of your injured arm up the wall as high as pain permits. (Don't shrug your shoulder up toward your ear.)  3. Hold your arm in that position for at least 15 to 30 seconds. 4. Slowly walk your fingers back down to where you started. 5. Repeat at least 2 to 4 times. Try to reach higher each time. Shoulder blade squeeze    1. Stand with your arms at your sides, and squeeze your shoulder blades together. Do not raise your shoulders up as you squeeze. 2. Hold 6 seconds. 3. Repeat 8 to 12 times. Scapular exercise: Arm reach    1. Lie flat on your back.  This exercise is a very slight motion that starts with your arms raised (elbows straight, arms straight). 2. From this position, reach higher toward the payam or ceiling. Keep your elbows straight. All motion should be from your shoulder blade only. 3. Relax your arms back to where you started. 4. Repeat 8 to 12 times. Arm raise to the side    During this strengthening exercise, your arm should stay about 30 degrees to the front of your side. 1. Slowly raise your injured arm to the side, with your thumb facing up. Raise your arm 60 degrees at the most (shoulder level is 90 degrees). 2. Hold the position for 3 to 5 seconds. Then lower your arm back to your side. If you need to, bring your \"good\" arm across your body and place it under the elbow as you lower your injured arm. Use your good arm to keep your injured arm from dropping down too fast.  3. Repeat 8 to 12 times. 4. When you first start out, don't hold any extra weight in your hand. As you get stronger, you may use a 1-pound to 2-pound dumbbell or a small can of food. Shoulder flexor and extensor exercise    These are isometric exercises. That means you contract your muscles without actually moving. 1. Push forward (flex): Stand facing a wall or doorjamb, about 6 inches or less back. Hold your injured arm against your body. Make a closed fist with your thumb on top. Then gently push your hand forward into the wall with about 25% to 50% of your strength. Don't let your body move backward as you push. Hold for about 6 seconds. Relax for a few seconds. Repeat 8 to 12 times. 2. Push backward (extend): Stand with your back flat against a wall. Your upper arm should be against the wall, with your elbow bent 90 degrees (your hand straight ahead). Push your elbow gently back against the wall with about 25% to 50% of your strength. Don't let your body move forward as you push. Hold for about 6 seconds. Relax for a few seconds. Repeat 8 to 12 times.   Scapular exercise: Wall push-ups    This exercise is best done with your fingers somewhat turned out, rather than straight up and down. 1. Stand facing a wall, about 12 inches to 18 inches away. 2. Place your hands on the wall at shoulder height. 3. Slowly bend your elbows and bring your face to the wall. Keep your back and hips straight. 4. Push back to where you started. 5. Repeat 8 to 12 times. 6. When you can do this exercise against a wall comfortably, you can try it against a counter. You can then slowly progress to the end of a couch, then to a sturdy chair, and finally to the floor. Scapular exercise: Retraction    For this exercise, you will need elastic exercise material, such as surgical tubing or Thera-Band. 1. Put the band around a solid object at about waist level. (A bedpost will work well.) Each hand should hold an end of the band. 2. With your elbows at your sides and bent to 90 degrees, pull the band back. Your shoulder blades should move toward each other. Then move your arms back where you started. 3. Repeat 8 to 12 times. 4. If you have good range of motion in your shoulders, try this exercise with your arms lifted out to the sides. Keep your elbows at a 90-degree angle. Raise the elastic band up to about shoulder level. Pull the band back to move your shoulder blades toward each other. Then move your arms back where you started. Internal rotator strengthening exercise    1. Start by tying a piece of elastic exercise material to a doorknob. You can use surgical tubing or Thera-Band. 2. Stand or sit with your shoulder relaxed and your elbow bent 90 degrees. Your upper arm should rest comfortably against your side. Squeeze a rolled towel between your elbow and your body for comfort. This will help keep your arm at your side. 3. Hold one end of the elastic band in the hand of the painful arm. 4. Slowly rotate your forearm toward your body until it touches your belly. Slowly move it back to where you started.   5. Keep your elbow and upper arm firmly tucked against the towel roll or at your side. 6. Repeat 8 to 12 times. External rotator strengthening exercise    1. Start by tying a piece of elastic exercise material to a doorknob. You can use surgical tubing or Thera-Band. (You may also hold one end of the band in each hand.)  2. Stand or sit with your shoulder relaxed and your elbow bent 90 degrees. Your upper arm should rest comfortably against your side. Squeeze a rolled towel between your elbow and your body for comfort. This will help keep your arm at your side. 3. Hold one end of the elastic band with the hand of the painful arm. 4. Start with your forearm across your belly. Slowly rotate the forearm out away from your body. Keep your elbow and upper arm tucked against the towel roll or the side of your body until you begin to feel tightness in your shoulder. Slowly move your arm back to where you started. 5. Repeat 8 to 12 times. Follow-up care is a key part of your treatment and safety. Be sure to make and go to all appointments, and call your doctor if you are having problems. It's also a good idea to know your test results and keep a list of the medicines you take. Where can you learn more? Go to http://bertha-shayla.info/. Enter Jocelyn Werner in the search box to learn more about \"Rotator Cuff: Exercises. \"  Current as of: March 21, 2017  Content Version: 11.4  © 5378-1687 [employer]. Care instructions adapted under license by [employer] (which disclaims liability or warranty for this information). If you have questions about a medical condition or this instruction, always ask your healthcare professional. [place] [address] any warranty or liability for your use of this information. Tennis Elbow: Exercises  Your Care Instructions  Here are some examples of typical rehabilitation exercises for your condition. Start each exercise slowly.  Ease off the exercise if you start to have pain.  Your doctor or physical therapist will tell you when you can start these exercises and which ones will work best for you. How to do the exercises  Wrist flexor stretch    8. Extend your arm in front of you with your palm up. 9. Bend your wrist, pointing your hand toward the floor. 10. With your other hand, gently bend your wrist farther until you feel a mild to moderate stretch in your forearm. 11. Hold for at least 15 to 30 seconds. Repeat 2 to 4 times. Wrist extensor stretch    5. Repeat steps 1 to 4 of the stretch above but begin with your extended hand palm down. Ball or sock squeeze    5. Hold a tennis ball (or a rolled-up sock) in your hand. 6. Make a fist around the ball (or sock) and squeeze. 7. Hold for about 6 seconds, and then relax for up to 10 seconds. 8. Repeat 8 to 12 times. 9. Switch the ball (or sock) to your other hand and do 8 to 12 times. Wrist deviation    6. Sit so that your arm is supported but your hand hangs off the edge of a flat surface, such as a table. 7. Hold your hand out like you are shaking hands with someone. 8. Move your hand up and down. 9. Repeat this motion 8 to 12 times. 10. Switch arms. 11. Try to do this exercise twice with each hand. Wrist curls    5. Place your forearm on a table with your hand hanging over the edge of the table, palm up. 6. Place a 1- to 2-pound weight in your hand. This may be a dumbbell, a can of food, or a filled water bottle. 7. Slowly raise and lower the weight while keeping your forearm on the table and your palm facing up. 8. Repeat this motion 8 to 12 times. 9. Switch arms, and do steps 1 through 4.  10. Repeat with your hand facing down toward the floor. Switch arms. Biceps curls    5. Sit leaning forward with your legs slightly spread and your left hand on your left thigh.   6. Place your right elbow on your right thigh, and hold the weight with your forearm horizontal.  7. Slowly curl the weight up and toward your chest.  8. Repeat this motion 8 to 12 times. 9. Switch arms, and do steps 1 through 4. Follow-up care is a key part of your treatment and safety. Be sure to make and go to all appointments, and call your doctor if you are having problems. It's also a good idea to know your test results and keep a list of the medicines you take. Where can you learn more? Go to http://bertha-shayla.info/. Enter U472 in the search box to learn more about \"Tennis Elbow: Exercises. \"  Current as of: March 21, 2017  Content Version: 11.4  © 2678-1714 [employer]. Care instructions adapted under license by [employer] (which disclaims liability or warranty for this information). If you have questions about a medical condition or this instruction, always ask your healthcare professional. [address] any warranty or liability for your use of this information. Golfer's Elbow: Exercises  Your Care Instructions  Here are some examples of typical rehabilitation exercises for your condition. Start each exercise slowly. Ease off the exercise if you start to have pain. Your doctor or physical therapist will tell you when you can start these exercises and which ones will work best for you. How to do the exercises  Wrist extensor stretch    12. Extend your affected arm in front of you and make a fist with your palm facing down. 15. Bend your wrist so that your fist points toward the floor. 14. With your other hand, gently bend your wrist farther until you feel a mild to moderate stretch in your forearm. 15. Hold for at least 15 to 30 seconds. 16. Repeat 2 to 4 times. 17. Repeat steps 1 through 5 with your fingers pointing toward the floor. Forearm extensor stretch    6. Place your affected elbow down at your side, bent at about 90 degrees. Then make a fist with your palm facing down.   7. Keeping your wrist bent, slowly straighten your elbow so your arm is down at your side. Then twist your fist out so your palm is facing out to the side and you feel a stretch. 8. Hold for at least 15 to 30 seconds. 9. Repeat 2 to 4 times. Wrist flexor stretch    10. Extend your affected arm in front of you with your palm facing away from your body. [address] back your wrist, pointing your hand up toward the ceiling. 12. With your other hand, gently bend your wrist farther until you feel a mild to moderate stretch in your forearm. 13. Hold for at least 15 to 30 seconds. 14. Repeat 2 to 4 times. 15. Repeat steps 1 through 5, but this time extend your affected arm in front of you with your palm facing up. Then bend back your wrist, pointing your hand toward the floor. Wrist curls    12. Place your forearm on a table with your hand hanging over the edge of the table, palm up. 15. Place a 1- to 2-pound weight in your hand. This may be a dumbbell, a can of food, or a filled water bottle. 14. Slowly raise and lower the weight while keeping your forearm on the table and your palm facing up. 15. Repeat this motion 8 to 12 times. 16. Switch arms, and do steps 1 through 4.  17. Repeat with your hand facing down toward the floor. Switch arms. Resisted wrist extension    11. Sit leaning forward with your legs slightly spread. Then place your affected forearm on your thigh with your hand and wrist in front of your knee. 12. Grasp one end of an exercise band with your palm down, and step on the other end. 13. Slowly bend your wrist upward for a count of 2, then lower your wrist slowly to a count of 5.  14. Repeat 8 to 12 times. Resisted wrist flexion    10. Sit leaning forward with your legs slightly spread. Then place your affected forearm on your thigh with your hand and wrist in front of your knee. 11. Grasp one end of an exercise band with your palm up, and step on the other end.   12. Slowly bend your wrist upward for a count of 2, then lower your wrist slowly to a count of 5.  13. Repeat 8 to 12 times. Neck stretch to the side    6. This stretch works best if you keep your shoulder down as you lean away from it. To help you remember to do this, start by relaxing your shoulders and lightly holding on to your thighs or your chair. 7. Tilt your head away from your affected elbow and toward your opposite shoulder. For example, if your right elbow is sore, keep your right shoulder down as you lean your head toward your left shoulder. 8. Hold for 15 to 30 seconds. Let the weight of your head stretch your muscles. 9. If you would like a little added stretch, use your hand to gently and steadily pull your head toward your shoulder. For example, if your right elbow is sore, use your left hand to gently pull your head toward your left shoulder. 10. Repeat 2 to 4 times. Resisted forearm pronation    6. Sit leaning forward with your legs slightly spread. Then place your affected forearm on your thigh with your hand and wrist in front of your knee. 7. Grasp one end of an exercise band with your palm up, and step on the other end. 8. Keeping your wrist straight, roll your palm inward toward your thigh for a count of 2, then slowly move your wrist back to the starting position to a count of 5.  9. Repeat 8 to 12 times. Resisted supination    4. Sit leaning forward with your legs slightly spread. Then place your affected forearm on your thigh with your hand and wrist in front of your knee. 5. Grasp one end of an exercise band with your palm down, and step on the other end. 6. Keeping your wrist straight, roll your palm outward and away from your thigh for a count of 2, then slowly move your wrist back to the starting position to a count of 5.  7. Repeat 8 to 12 times. Follow-up care is a key part of your treatment and safety. Be sure to make and go to all appointments, and call your doctor if you are having problems.  It's also a good idea to know your test results and keep a list of the medicines you take.  Where can you learn more? Go to http://bertha-shayla.info/. Enter [id number] in the search box to learn more about \"Golfer's Elbow: Exercises. \"  Current as of: March 21, 2017  Content Version: 11.4  © 8517-2572 [employer]. Care instructions adapted under license by [employer] (which disclaims liability or warranty for this information). If you have questions about a medical condition or this instruction, always ask your healthcare professional. [address] any warranty or liability for your use of this information.

## 2017-11-02 NOTE — PROGRESS NOTES
Carolina Quesada is a 78 y.o. female (: 1950) presenting to address:    Chief Complaint   Patient presents with    Elbow Pain     bilateral elbow pain    Shoulder Pain     bilateral shoulder blade pain       Vitals:    17 0846   BP: 134/67   Pulse: 80   Resp: 18   Temp: 97.5 °F (36.4 °C)   TempSrc: Oral   SpO2: 99%   Weight: 134 lb 12.8 oz (61.1 kg)   Height: 5' 1\" (1.549 m)   PainSc:   8   PainLoc: Elbow       Hearing/Vision:   No exam data present    Learning Assessment:     Learning Assessment 2016   PRIMARY LEARNER Patient   HIGHEST LEVEL OF EDUCATION - PRIMARY LEARNER  SOME COLLEGE   PRIMARY LANGUAGE ENGLISH   LEARNER PREFERENCE PRIMARY DEMONSTRATION   ANSWERED BY patient   RELATIONSHIP SELF     Depression Screening:     PHQ over the last two weeks 2017   PHQ Not Done Active Diagnosis of Depression or Bipolar Disorder   Little interest or pleasure in doing things -   Feeling down, depressed or hopeless -   Total Score PHQ 2 -     Fall Risk Assessment:     Fall Risk Assessment, last 12 mths 2017   Able to walk? Yes   Fall in past 12 months? No   Fall with injury? -   Number of falls in past 12 months -   Fall Risk Score -     Abuse Screening:     Abuse Screening Questionnaire 10/23/2017   Do you ever feel afraid of your partner? N   Are you in a relationship with someone who physically or mentally threatens you? N   Is it safe for you to go home? Y     Coordination of Care Questionaire:   1. Have you been to the ER, urgent care clinic since your last visit? Hospitalized since your last visit? NO    2. Have you seen or consulted any other health care providers outside of the [address] [place] [address] since your last visit? Include any pap smears or colon screening. NO    Advanced Directive:   1. Do you have an Advanced Directive? NO    2. Would you like information on Advanced Directives?  NO

## 2017-11-02 NOTE — MR AVS SNAPSHOT
Visit Information Date & Time Provider Department Dept. Phone Encounter #  
 11/2/2017  8:40 AM Guevara Do  Analia Wright [phone] [id number] Follow-up Instructions Return if symptoms worsen or fail to improve. Upcoming Health Maintenance Date Due Pneumococcal 65+ Low/Medium Risk (1 of 2 - PCV13) 4/5/2016 BREAST CANCER SCRN MAMMOGRAM 1/26/2018 EYE EXAM RETINAL OR DILATED Q1 12/7/2017 MEDICARE YEARLY EXAM 1/19/2018 HEMOGLOBIN A1C Q6M 4/24/2018 FOOT EXAM Q1 10/23/2018 MICROALBUMIN Q1 10/24/2018 LIPID PANEL Q1 10/24/2018 GLAUCOMA SCREENING Q2Y 12/7/2018 DTaP/Tdap/Td series (2 - Td) 8/11/2020 COLONOSCOPY 12/21/2021 Allergies as of 11/2/2017  Review Complete On: 11/2/2017 By: Guevara Do MD  
  
 Severity Noted Reaction Type Reactions Codeine  04/01/2016    Nausea and Vomiting Current Immunizations  Reviewed on 1/18/2017 Name Date Influenza High Dose Vaccine PF 10/23/2017  1:51 PM, 8/25/2016 11:56 AM  
 Influenza Vaccine 10/23/2015 Pneumococcal Polysaccharide (PPSV-23) 11/14/2014 Tdap 8/11/2010 Zoster Vaccine, Live 7/20/2011 Not reviewed this visit You Were Diagnosed With   
  
 Codes Comments Type 2 diabetes mellitus with diabetic polyneuropathy, without long-term current use of insulin (HCC)    -  Primary ICD-10-CM: E11.42 
ICD-9-CM: 250.60, 357.2 Vitals BP Pulse Temp Resp Height(growth percentile) Weight(growth percentile) 134/67 (BP 1 Location: Left arm, BP Patient Position: Sitting) 80 97.5 °F (36.4 °C) (Oral) 18 5' 1\" (1.549 m) 134 lb 12.8 oz (61.1 kg) SpO2 BMI OB Status Smoking Status 99% 25.47 kg/m2 Postmenopausal Never Smoker Vitals History BMI and BSA Data Body Mass Index Body Surface Area  
 25.47 kg/m 2 1.62 m 2 Preferred Pharmacy Pharmacy Name Phone [address] [facility], [facility] Gurwinder Mullins [phone] Your Updated Medication List  
  
   
This list is accurate as of: 11/2/17  9:30 AM.  Always use your most recent med list.  
  
  
  
  
 albuterol 90 mcg/actuation inhaler Commonly known as:  PROVENTIL HFA, VENTOLIN HFA, PROAIR HFA Take  by inhalation. atorvastatin 10 mg tablet Commonly known as:  LIPITOR Take 1 Tab by mouth daily. chlorthalidone 25 mg tablet Commonly known as:  Suzen Pay Take 1 Tab by mouth daily. CINNAMON 500 mg Cap Generic drug:  cinnamon bark Take  by mouth. diclofenac 1 % Gel Commonly known as:  VOLTAREN Apply  to affected area four (4) times daily. docusate sodium 100 mg capsule Commonly known as:  [place] Lecher Take 100 mg by mouth two (2) times a day. fenofibrate 54 mg tablet Commonly known as:  LOFIBRA Take 1 Tab by mouth daily. fluticasone-salmeterol 100-50 mcg/dose diskus inhaler Commonly known as:  ADVAIR Take 1 Puff by inhalation every twelve (12) hours. gabapentin 100 mg capsule Commonly known as:  NEURONTIN Take 1 Cap by mouth three (3) times daily. garlic 0,792 mg Cap Take  by mouth. GINGER EXTRACT 250 mg Cap Generic drug:  ginger (Zingiber officinalis) Take  by mouth.  
  
 levothyroxine 50 mcg tablet Commonly known as:  SYNTHROID Take 1 Tab by mouth Daily (before breakfast). lisinopril 40 mg tablet Commonly known as:  [place] Take 1 Tab by mouth daily. meclizine 25 mg tablet Commonly known as:  ANTIVERT Take 1 Tab by mouth three (3) times daily as needed. metFORMIN 1,000 mg tablet Commonly known as:  GLUCOPHAGE Take 1 Tab by mouth two (2) times daily (with meals). MIRALAX 17 gram packet Generic drug:  polyethylene glycol Take 17 g by mouth daily. OCUVITE ADULT 50+ PO Take  by mouth daily. Omega-3-DHA-EPA-Fish Oil 1,000 mg (120 mg-180 mg) Cap Take  by mouth daily. ondansetron hcl 4 mg tablet Commonly known as:  ZOFRAN (AS HYDROCHLORIDE) Take 1 Tab by mouth every eight (8) hours as needed for Nausea. PARoxetine 40 mg tablet Commonly known as:  PAXIL Take 1 Tab by mouth daily. potassium chloride 10 mEq tablet Commonly known as:  KLOR-CON Take 1 Tab by mouth daily. propranolol LA 60 mg SR capsule Commonly known as:  INDERAL LA Take 1 Cap by mouth daily. Indications: MIGRAINE PREVENTION SITagliptin 100 mg tablet Commonly known as:  Ferol Chiquito Take 1 Tab by mouth daily. Prescriptions Sent to Pharmacy Refills  
 diclofenac (VOLTAREN) 1 % gel 1 Sig: Apply  to affected area four (4) times daily. Class: Normal  
 Pharmacy: [id number] [facility], Sandor Purdy Justusyoan Schwartz  #: [phone] Route: Topical  
  
Follow-up Instructions Return if symptoms worsen or fail to improve. Patient Instructions To Do: 
· Start with the exercises for your shoulders & elbows · Okay to use the acetaminophen (tylenol) and the voltaren gel on the painful areas as needed · If really painful, can also use ice / frozen veggies as needed for 20min on the painful areas Notes from your doctor: · If still not better, please call and let me know. Other options include: · Steroid injections (although would prefer to avoid as you have diabetes, and this can raise your blood sugar) · Formal physical therapy Rotator Cuff: Exercises Your Care Instructions Here are some examples of typical rehabilitation exercises for your condition. Start each exercise slowly. Ease off the exercise if you start to have pain. Your doctor or physical therapist will tell you when you can start these exercises and which ones will work best for you. How to do the exercises Pendulum swing If you have pain in your back, do not do this exercise. 4. Hold on to a table or the back of a chair with your good arm. Then bend forward a little and let your sore arm hang straight down. This exercise does not use the arm muscles. Rather, use your legs and your hips to create movement that makes your arm swing freely. 5. Use the movement from your hips and legs to guide the slightly swinging arm back and forth like a pendulum (or elephant trunk). Then guide it in circles that start small (about the size of a dinner plate). Make the circles a bit larger each day, as your pain allows. 6. Do this exercise for 5 minutes, 5 to 7 times each day. 7. As you have less pain, try bending over a little farther to do this exercise. This will increase the amount of movement at your shoulder. Posterior stretching exercise 1. Hold the elbow of your injured arm with your other hand. 2. Use your hand to pull your injured arm gently up and across your body. You will feel a gentle stretch across the back of your injured shoulder. 3. Hold for at least 15 to 30 seconds. Then slowly lower your arm. 4. Repeat 2 to 4 times. Up-the-back stretch Your doctor or physical therapist may want you to wait to do this stretch until you have regained most of your range of motion and strength. You can do this stretch in different ways. Hold any of these stretches for at least 15 to 30 seconds. Repeat them 2 to 4 times. 2. Put your hand in your back pocket. Let it rest there to stretch your shoulder. 3. With your other hand, hold your injured arm (palm outward) behind your back by the wrist. Pull your arm up gently to stretch your shoulder. 4. Next, put a towel over your other shoulder. Put the hand of your injured arm behind your back. Now hold the back end of the towel. With the other hand, hold the front end of the towel in front of your body. Pull gently on the front end of the towel.  This will bring your hand farther up your back to stretch your shoulder. Overhead stretch 1. Standing about an arm's length away, grasp onto a solid surface. You could use a countertop, a doorknob, or the back of a sturdy chair. 2. With your knees slightly bent, bend forward with your arms straight. Lower your upper body, and let your shoulders stretch. 3. As your shoulders are able to stretch farther, you may need to take a step or two backward. 4. Hold for at least 15 to 30 seconds. Then stand up and relax. If you had stepped back during your stretch, step forward so you can keep your hands on the solid surface. 5. Repeat 2 to 4 times. Shoulder flexion (lying down) To make a wand for this exercise, use a piece of PVC pipe or a broom handle with the broom removed. Make the wand about a foot wider than your shoulders. 1. Lie on your back, holding a wand with both hands. Your palms should face down as you hold the wand. 2. Keeping your elbows straight, slowly raise your arms over your head. Raise them until you feel a stretch in your shoulders, upper back, and chest. 
3. Hold for 15 to 30 seconds. 4. Repeat 2 to 4 times. Shoulder rotation (lying down) To make a wand for this exercise, use a piece of PVC pipe or a broom handle with the broom removed. Make the wand about a foot wider than your shoulders. 1. Lie on your back. Hold a wand with both hands with your elbows bent and palms up. 2. Keep your elbows close to your body, and move the wand across your body toward the sore arm. 3. Hold for 8 to 12 seconds. 4. Repeat 2 to 4 times. Wall climbing (to the side) Avoid any movement that is straight to your side, and be careful not to arch your back. Your arm should stay about 30 degrees to the front of your side. 1. Stand with your side to a wall so that your fingers can just touch it at an angle about 30 degrees toward the front of your body.  
2. Walk the fingers of your injured arm up the wall as high as pain permits. Try not to shrug your shoulder up toward your ear as you move your arm up. 3. Hold that position for a count of at least 15 to 20. 
4. Walk your fingers back down to the starting position. 5. Repeat at least 2 to 4 times. Try to reach higher each time. Wall climbing (to the front) During this stretching exercise, be careful not to arch your back. 1. Face a wall, and stand so your fingers can just touch it. 2. Keeping your shoulder down, walk the fingers of your injured arm up the wall as high as pain permits. (Don't shrug your shoulder up toward your ear.) 3. Hold your arm in that position for at least 15 to 30 seconds. 4. Slowly walk your fingers back down to where you started. 5. Repeat at least 2 to 4 times. Try to reach higher each time. Shoulder blade squeeze 1. Stand with your arms at your sides, and squeeze your shoulder blades together. Do not raise your shoulders up as you squeeze. 2. Hold 6 seconds. 3. Repeat 8 to 12 times. Scapular exercise: Arm reach 1. Lie flat on your back. This exercise is a very slight motion that starts with your arms raised (elbows straight, arms straight). 2. From this position, reach higher toward the payam or ceiling. Keep your elbows straight. All motion should be from your shoulder blade only. 3. Relax your arms back to where you started. 4. Repeat 8 to 12 times. Arm raise to the side During this strengthening exercise, your arm should stay about 30 degrees to the front of your side. 1. Slowly raise your injured arm to the side, with your thumb facing up. Raise your arm 60 degrees at the most (shoulder level is 90 degrees). 2. Hold the position for 3 to 5 seconds. Then lower your arm back to your side. If you need to, bring your \"good\" arm across your body and place it under the elbow as you lower your injured arm. Use your good arm to keep your injured arm from dropping down too fast. 
3. Repeat 8 to 12 times. 4. When you first start out, don't hold any extra weight in your hand. As you get stronger, you may use a 1-pound to 2-pound dumbbell or a small can of food. Shoulder flexor and extensor exercise These are isometric exercises. That means you contract your muscles without actually moving. 1. Push forward (flex): Stand facing a wall or doorjamb, about 6 inches or less back. Hold your injured arm against your body. Make a closed fist with your thumb on top. Then gently push your hand forward into the wall with about 25% to 50% of your strength. Don't let your body move backward as you push. Hold for about 6 seconds. Relax for a few seconds. Repeat 8 to 12 times. 2. Push backward (extend): Stand with your back flat against a wall. Your upper arm should be against the wall, with your elbow bent 90 degrees (your hand straight ahead). Push your elbow gently back against the wall with about 25% to 50% of your strength. Don't let your body move forward as you push. Hold for about 6 seconds. Relax for a few seconds. Repeat 8 to 12 times. Scapular exercise: Wall push-ups This exercise is best done with your fingers somewhat turned out, rather than straight up and down. 1. Stand facing a wall, about 12 inches to 18 inches away. 2. Place your hands on the wall at shoulder height. 3. Slowly bend your elbows and bring your face to the wall. Keep your back and hips straight. 4. Push back to where you started. 5. Repeat 8 to 12 times. 6. When you can do this exercise against a wall comfortably, you can try it against a counter. You can then slowly progress to the end of a couch, then to a sturdy chair, and finally to the floor. Scapular exercise: Retraction For this exercise, you will need elastic exercise material, such as surgical tubing or Thera-Band. 1. Put the band around a solid object at about waist level. (A bedpost will work well.) Each hand should hold an end of the band. 2. With your elbows at your sides and bent to 90 degrees, pull the band back. Your shoulder blades should move toward each other. Then move your arms back where you started. 3. Repeat 8 to 12 times. 4. If you have good range of motion in your shoulders, try this exercise with your arms lifted out to the sides. Keep your elbows at a 90-degree angle. Raise the elastic band up to about shoulder level. Pull the band back to move your shoulder blades toward each other. Then move your arms back where you started. Internal rotator strengthening exercise 1. Start by tying a piece of elastic exercise material to a doorknob. You can use surgical tubing or Thera-Band. 2. Stand or sit with your shoulder relaxed and your elbow bent 90 degrees. Your upper arm should rest comfortably against your side. Squeeze a rolled towel between your elbow and your body for comfort. This will help keep your arm at your side. 3. Hold one end of the elastic band in the hand of the painful arm. 4. Slowly rotate your forearm toward your body until it touches your belly. Slowly move it back to where you started. 5. Keep your elbow and upper arm firmly tucked against the towel roll or at your side. 6. Repeat 8 to 12 times. External rotator strengthening exercise 1. Start by tying a piece of elastic exercise material to a doorknob. You can use surgical tubing or Thera-Band. (You may also hold one end of the band in each hand.) 2. Stand or sit with your shoulder relaxed and your elbow bent 90 degrees. Your upper arm should rest comfortably against your side. Squeeze a rolled towel between your elbow and your body for comfort. This will help keep your arm at your side. 3. Hold one end of the elastic band with the hand of the painful arm. 4. Start with your forearm across your belly. Slowly rotate the forearm out away from your body.  Keep your elbow and upper arm tucked against the towel roll or the side of your body until you begin to feel tightness in your shoulder. Slowly move your arm back to where you started. 5. Repeat 8 to 12 times. Follow-up care is a key part of your treatment and safety. Be sure to make and go to all appointments, and call your doctor if you are having problems. It's also a good idea to know your test results and keep a list of the medicines you take. Where can you learn more? Go to http://bertha-shayla.info/. Enter Agatha Eng in the search box to learn more about \"Rotator Cuff: Exercises. \" Current as of: March 21, 2017 Content Version: 11.4 © 3931-6190 [employer]. Care instructions adapted under license by [employer] (which disclaims liability or warranty for this information). If you have questions about a medical condition or this instruction, always ask your healthcare professional. [address] any warranty or liability for your use of this information. Tennis Elbow: Exercises Your Care Instructions Here are some examples of typical rehabilitation exercises for your condition. Start each exercise slowly. Ease off the exercise if you start to have pain. Your doctor or physical therapist will tell you when you can start these exercises and which ones will work best for you. How to do the exercises Wrist flexor stretch 8. Extend your arm in front of you with your palm up. 9. Bend your wrist, pointing your hand toward the floor. 10. With your other hand, gently bend your wrist farther until you feel a mild to moderate stretch in your forearm. 11. Hold for at least 15 to 30 seconds. Repeat 2 to 4 times. Wrist extensor stretch 5. Repeat steps 1 to 4 of the stretch above but begin with your extended hand palm down. Ball or sock squeeze 5. Hold a tennis ball (or a rolled-up sock) in your hand. 6. Make a fist around the ball (or sock) and squeeze. 7. Hold for about 6 seconds, and then relax for up to 10 seconds. 8. Repeat 8 to 12 times. 9. Switch the ball (or sock) to your other hand and do 8 to 12 times. Wrist deviation 6. Sit so that your arm is supported but your hand hangs off the edge of a flat surface, such as a table. 7. Hold your hand out like you are shaking hands with someone. 8. Move your hand up and down. 9. Repeat this motion 8 to 12 times. 10. Switch arms. 11. Try to do this exercise twice with each hand. Wrist curls 5. Place your forearm on a table with your hand hanging over the edge of the table, palm up. 6. Place a 1- to 2-pound weight in your hand. This may be a dumbbell, a can of food, or a filled water bottle. 7. Slowly raise and lower the weight while keeping your forearm on the table and your palm facing up. 8. Repeat this motion 8 to 12 times. 9. Switch arms, and do steps 1 through 4. 
10. Repeat with your hand facing down toward the floor. Switch arms. Biceps curls 5. Sit leaning forward with your legs slightly spread and your left hand on your left thigh. 6. Place your right elbow on your right thigh, and hold the weight with your forearm horizontal. 
7. Slowly curl the weight up and toward your chest. 
8. Repeat this motion 8 to 12 times. 9. Switch arms, and do steps 1 through 4. Follow-up care is a key part of your treatment and safety. Be sure to make and go to all appointments, and call your doctor if you are having problems. It's also a good idea to know your test results and keep a list of the medicines you take. Where can you learn more? Go to http://bertha-shayla.info/. Enter Z183 in the search box to learn more about \"Tennis Elbow: Exercises. \" Current as of: March 21, 2017 Content Version: 11.4 © 1051-7164 [employer].  Care instructions adapted under license by [employer] (which disclaims liability or warranty for this information). If you have questions about a medical condition or this instruction, always ask your healthcare professional. [address] any warranty or liability for your use of this information. Golchris's Elbow: Exercises Your Care Instructions Here are some examples of typical rehabilitation exercises for your condition. Start each exercise slowly. Ease off the exercise if you start to have pain. Your doctor or physical therapist will tell you when you can start these exercises and which ones will work best for you. How to do the exercises Wrist extensor stretch 12. Extend your affected arm in front of you and make a fist with your palm facing down. 15. Bend your wrist so that your fist points toward the floor. 14. With your other hand, gently bend your wrist farther until you feel a mild to moderate stretch in your forearm. 15. Hold for at least 15 to 30 seconds. 16. Repeat 2 to 4 times. 17. Repeat steps 1 through 5 with your fingers pointing toward the floor. Forearm extensor stretch 6. Place your affected elbow down at your side, bent at about 90 degrees. Then make a fist with your palm facing down. 7. Keeping your wrist bent, slowly straighten your elbow so your arm is down at your side. Then twist your fist out so your palm is facing out to the side and you feel a stretch. 8. Hold for at least 15 to 30 seconds. 9. Repeat 2 to 4 times. Wrist flexor stretch 10. Extend your affected arm in front of you with your palm facing away from your body. [address] back your wrist, pointing your hand up toward the ceiling. 12. With your other hand, gently bend your wrist farther until you feel a mild to moderate stretch in your forearm. 13. Hold for at least 15 to 30 seconds. 14. Repeat 2 to 4 times. 15. Repeat steps 1 through 5, but this time extend your affected arm in front of you with your palm facing up.  Then bend back your wrist, pointing your hand toward the floor. Wrist curls 12. Place your forearm on a table with your hand hanging over the edge of the table, palm up. 15. Place a 1- to 2-pound weight in your hand. This may be a dumbbell, a can of food, or a filled water bottle. 14. Slowly raise and lower the weight while keeping your forearm on the table and your palm facing up. 15. Repeat this motion 8 to 12 times. 16. Switch arms, and do steps 1 through 4. 
17. Repeat with your hand facing down toward the floor. Switch arms. Resisted wrist extension 11. Sit leaning forward with your legs slightly spread. Then place your affected forearm on your thigh with your hand and wrist in front of your knee. 12. Grasp one end of an exercise band with your palm down, and step on the other end. 13. Slowly bend your wrist upward for a count of 2, then lower your wrist slowly to a count of 5. 
14. Repeat 8 to 12 times. Resisted wrist flexion 10. Sit leaning forward with your legs slightly spread. Then place your affected forearm on your thigh with your hand and wrist in front of your knee. 11. Grasp one end of an exercise band with your palm up, and step on the other end. 12. Slowly bend your wrist upward for a count of 2, then lower your wrist slowly to a count of 5. 
13. Repeat 8 to 12 times. Neck stretch to the side 6. This stretch works best if you keep your shoulder down as you lean away from it. To help you remember to do this, start by relaxing your shoulders and lightly holding on to your thighs or your chair. 7. Tilt your head away from your affected elbow and toward your opposite shoulder. For example, if your right elbow is sore, keep your right shoulder down as you lean your head toward your left shoulder. 8. Hold for 15 to 30 seconds. Let the weight of your head stretch your muscles.  
9. If you would like a little added stretch, use your hand to gently and steadily pull your head toward your shoulder. For example, if your right elbow is sore, use your left hand to gently pull your head toward your left shoulder. 10. Repeat 2 to 4 times. Resisted forearm pronation 6. Sit leaning forward with your legs slightly spread. Then place your affected forearm on your thigh with your hand and wrist in front of your knee. 7. Grasp one end of an exercise band with your palm up, and step on the other end. 8. Keeping your wrist straight, roll your palm inward toward your thigh for a count of 2, then slowly move your wrist back to the starting position to a count of 5. 
9. Repeat 8 to 12 times. Resisted supination 4. Sit leaning forward with your legs slightly spread. Then place your affected forearm on your thigh with your hand and wrist in front of your knee. 5. Grasp one end of an exercise band with your palm down, and step on the other end. 6. Keeping your wrist straight, roll your palm outward and away from your thigh for a count of 2, then slowly move your wrist back to the starting position to a count of 5. 
7. Repeat 8 to 12 times. Follow-up care is a key part of your treatment and safety. Be sure to make and go to all appointments, and call your doctor if you are having problems. It's also a good idea to know your test results and keep a list of the medicines you take. Where can you learn more? Go to http://bertha-shayla.info/. Enter [phone] 5595 4019 in the search box to learn more about \"Golfer's Elbow: Exercises. \" Current as of: March 21, 2017 Content Version: 11.4 © 7510-1607 [employer]wise[employer]. Care instructions adapted under license by [employer] (which disclaims liability or warranty for this information). If you have questions about a medical condition or this instruction, always ask your healthcare professional. [address] any warranty or liability for your use of this information. Introducing [facility]! [employer] introduces UpOut patient portal. Now you can access parts of your medical record, email your doctor's office, and request medication refills online. 1. In your internet browser, go to [email or web address] 2. Click on the First Time User? Click Here link in the Sign In box. You will see the New Member Sign Up page. 3. Enter your UpOut Access Code exactly as it appears below. You will not need to use this code after youve completed the sign-up process. If you do not sign up before the expiration date, you must request a new code. · UpOut Access Code: [email or web address] Expires: 1/21/2018  1:47 PM 
 
4. Enter the last four digits of your Social Security Number (xxxx) and Date of Birth (mm/dd/yyyy) as indicated and click Submit. You will be taken to the next sign-up page. 5. Create a UpOut ID. This will be your UpOut login ID and cannot be changed, so think of one that is secure and easy to remember. 6. Create a UpOut password. You can change your password at any time. 7. Enter your Password Reset Question and Answer. This can be used at a later time if you forget your password. 8. Enter your e-mail address. You will receive e-mail notification when new information is available in [address] 9. Click Sign Up. You can now view and download portions of your medical record. 10. Click the Download Summary menu link to download a portable copy of your medical information. If you have questions, please visit the Frequently Asked Questions section of the UpOut website. Remember, UpOut is NOT to be used for urgent needs. For medical emergencies, dial 911. Now available from your iPhone and Android! Please provide this summary of care documentation to your next provider. Your primary care clinician is listed as [address]. If you have any questions after today's visit, please call [phone].

## 2017-11-02 NOTE — PROGRESS NOTES
[facility]  Sports Medicine Consultation Note    Isabel Hutchins is a 78 y.o. female (: 1950) presenting to obtain consultative services regarding:  Chief Complaint   Patient presents with    Elbow Pain     bilateral elbow pain    Shoulder Pain     bilateral shoulder blade pain       PCP: Ever Rashid MD  Requesting provider: Ever Rashid MD    Assessment/Plan:       ICD-10-CM ICD-9-CM   1. Lateral epicondylitis of both elbows - L >> R, initial encounter M77.11 726.32    M77.12    2. Bilateral medial epicondylitis of elbow joint - initial encounter M77.01 726.31    M77.02    3. Dysfunction of rotator cuff of both shoulders - initial encounter M67.911 726.10    M67.912    4. Type 2 diabetes mellitus with complication, without long-term current use of insulin (HCC) E11.8 250.90         Reviewed imaging results with Isabel Hutchins. Pain is mostly 2/2 tendinitis/osis at the elbows, rather than the DJD changes seen on XR. Discussed with Isabel Hutchins at length. She agrees with conservative measures for now, avoiding steroid injection especially in light of dx of T2DM. Start HEP, ice, topical NSAID prn, continue PO tylenol prn. Future considerations: steroid injection, formal PT        Orders Placed This Encounter    diclofenac (VOLTAREN) 1 % gel     Sig: Apply  to affected area four (4) times daily. Dispense:  100 g     Refill:  1     Spent 25min face-to-face, >50% spent on counseling & patient education. Overdue HM items:   Ms. Lucia Shelley has a reminder for a \"due or due soon\" health maintenance. I have asked that she contact her primary care provider for follow-up on this health maintenance. Management plan & patient instructions discussed with Isabel Hutchins, who voiced understanding. Routed information to requesting provider in shared medical record. Thank you for the opportunity to participate in the care of this patient.   If any questions or concerns at all, please feel free to contact me. This document may have been created with the aid of dictation software. Text may contain errors, particularly phonetic errors. Tonio Abreu MD  Internal Medicine, Family Medicine & Sports Medicine  11/2/2017, 9:06 AM    Patient Instructions (provided in AVS): To Do:  · Start with the exercises for your shoulders & elbows  · Okay to use the acetaminophen (tylenol) and the voltaren gel on the painful areas as needed  · If really painful, can also use ice / frozen veggies as needed for 20min on the painful areas    Notes from your doctor:  · If still not better, please call and let me know. Other options include:  · Steroid injections (although would prefer to avoid as you have diabetes, and this can raise your blood sugar)  · Formal physical therapy    Rotator Cuff: Exercises  Tennis Elbow: Exercises  Golfer's Elbow: Exercises    History:     I was asked to provide consultative services by Yasemin Linder MD for advice/opinion related to evaluating    Chief Complaint   Patient presents with    Elbow Pain     bilateral elbow pain    Shoulder Pain     bilateral shoulder blade pain       Months of B elbow and B shoulder pain. RHD  Mostly at night, prevents her from sleeping. Sometimes radiates up to lateral upper arms. Occasional numbness and tingling mainly over the elbows. L > R. Tried: 1-2 tab of tylenol before bed. Used to work as a as a .    ~3yrs ago injection to elbow which helped. Least pain over the last 2 weeks has been 4/10. Worst pain over the last 2 weeks has been 10/10 - mainly before going to bed. Current 8/10.         Past Medical History:   Diagnosis Date    Acquired hypothyroidism 4/1/2016    Asthma 4/1/2016    Depression     Diabetes (HCC)     Diabetic neuropathy (HCC)     feet    Dizziness     GERD (gastroesophageal reflux disease)     HLD (hyperlipidemia) 4/1/2016    Hypercholesterolemia  Hypertension     Sleep disturbance 7/25/2016    Thyroid disease      Past Surgical History:   Procedure Laterality Date    HX APPENDECTOMY  07/14/2016    Dr. Shellie Pride HX COLONOSCOPY  12/12/2016    Repeat colonoscopy in 5 years per Dr. Buster Mcknight; last 12/12/16    HX HERNIA REPAIR  09/2017    Dr. Krishna Ramos. reports that she has never smoked. She has never used smokeless tobacco. She reports that she drinks alcohol. She reports that she does not use illicit drugs. Family History   Problem Relation Age of Onset    Diabetes Mother     Heart Disease Mother     Thyroid Disease Sister      Allergies   Allergen Reactions    Codeine Nausea and Vomiting       Problem List:      Patient Active Problem List    Diagnosis    Sleep disturbance    HLD (hyperlipidemia)    Acquired hypothyroidism    Dizziness    Asthma    Hypertension    Diabetes ([address]    GERD (gastroesophageal reflux disease)    Depression       Medications:     Current Outpatient Prescriptions on File Prior to Visit   Medication Sig Dispense Refill    chlorthalidone (HYGROTEN) 25 mg tablet Take 1 Tab by mouth daily. 90 Tab 1    potassium chloride (KLOR-CON) 10 mEq tablet Take 1 Tab by mouth daily. 90 Tab 1    albuterol (PROVENTIL HFA, VENTOLIN HFA, PROAIR HFA) 90 mcg/actuation inhaler Take  by inhalation.  polyethylene glycol (MIRALAX) 17 gram packet Take 17 g by mouth daily.  docusate sodium (COLACE) 100 mg capsule Take 100 mg by mouth two (2) times a day.  cinnamon bark (CINNAMON) 500 mg cap Take  by mouth.  garlic 5,936 mg cap Take  by mouth.  willie, Zingiber officinalis, (WILLIE EXTRACT) 250 mg cap Take  by mouth.  lisinopril (PRINIVIL, ZESTRIL) 40 mg tablet Take 1 Tab by mouth daily. 90 Tab 2    propranolol LA (INDERAL LA) 60 mg SR capsule Take 1 Cap by mouth daily.  Indications: MIGRAINE PREVENTION 90 Cap 2    levothyroxine (SYNTHROID) 50 mcg tablet Take 1 Tab by mouth Daily (before breakfast). 90 Tab 2    gabapentin (NEURONTIN) 100 mg capsule Take 1 Cap by mouth three (3) times daily. 270 Cap 2    ondansetron hcl (ZOFRAN, AS HYDROCHLORIDE,) 4 mg tablet Take 1 Tab by mouth every eight (8) hours as needed for Nausea. 60 Tab 1    meclizine (ANTIVERT) 25 mg tablet Take 1 Tab by mouth three (3) times daily as needed. 60 Tab 1    metFORMIN (GLUCOPHAGE) 1,000 mg tablet Take 1 Tab by mouth two (2) times daily (with meals). (Patient taking differently: Take 88 mg by mouth two (2) times daily (with meals). ) 180 Tab 1    atorvastatin (LIPITOR) 10 mg tablet Take 1 Tab by mouth daily. 90 Tab 2    fenofibrate (LOFIBRA) 54 mg tablet Take 1 Tab by mouth daily. 90 Tab 2    SITagliptin (JANUVIA) 100 mg tablet Take 1 Tab by mouth daily. 90 Tab 2    PARoxetine (PAXIL) 40 mg tablet Take 1 Tab by mouth daily. 90 Tab 2    fluticasone-salmeterol (ADVAIR) 100-50 mcg/dose diskus inhaler Take 1 Puff by inhalation every twelve (12) hours. 3 Inhaler 2    Omega-3-DHA-EPA-Fish Oil 1,000 mg (120 mg-180 mg) cap Take  by mouth daily.  ANTIOX #11/OM3/DHA/EPA/LUT/SHASHANK (OCUVITE ADULT 50+ PO) Take  by mouth daily. No current facility-administered medications on file prior to visit. Review of Systems:     Review of Systems   Musculoskeletal: Positive for joint pain. Skin: Negative for rash. Neurological: Positive for tingling (occasionally, see HPI). Negative for tremors, sensory change and focal weakness. Physical Assessment:   VS:    Vitals:    11/02/17 0846   BP: 134/67   Pulse: 80   Resp: 18   Temp: 97.5 °F (36.4 °C)   TempSrc: Oral   SpO2: 99%   Weight: 134 lb 12.8 oz (61.1 kg)   Height: 5' 1\" (1.549 m)   PainSc:   8   PainLoc: Elbow       Physical Exam   Constitutional: She is oriented to person, place, and time. She appears well-developed and well-nourished. No distress. HENT:   Head: Normocephalic and atraumatic.    Eyes: EOM are normal. Neck: Neck supple. Pulmonary/Chest: Effort normal.   Musculoskeletal:        Right shoulder: She exhibits decreased range of motion (+ painful arc), pain (with [employer]) and decreased strength (4/5 SupraS without drop arm). She exhibits no bony tenderness and no deformity. Left shoulder: She exhibits decreased range of motion (+ painful arc), pain (with [employer]) and decreased strength (4/5 SupraS without drop arm). She exhibits no bony tenderness and no deformity. Right elbow: She exhibits decreased range of motion. She exhibits no swelling and no effusion. Tenderness found. Lateral epicondyle tenderness noted. No radial head, no medial epicondyle and no olecranon process tenderness noted. Left elbow: She exhibits decreased range of motion. She exhibits no swelling and no effusion. Tenderness found. Lateral epicondyle tenderness noted. No radial head, no medial epicondyle and no olecranon process tenderness noted. Neurological: She is alert and oriented to person, place, and time. Coordination normal.   Intact to light sensation over B C5-T1 dermatomes  Normal  strength B  + pain with resisted wrist extension bilaterally     Skin: Skin is warm and dry. She is not diaphoretic. Psychiatric: She has a normal mood and affect. Her behavior is normal. Thought content normal.   Nursing note reviewed. Recent Labs & Imaging:     XR Results (maximum last 3): Results from Appointment encounter on 10/23/17   XR ELBOW LT MIN 3 V   Narrative Left elbow x-ray    HISTORY / INDICATION: One-month history elbow pain. COMPARISON: Right elbow x-ray 10 23/17    TECHNIQUE: 4 views of the left elbow were obtained. FINDINGS:  No acute fractures or dislocations. No radiographic evidence of significant  soft tissue swelling. The visualized joint spaces are normal.  Osteophytic  spurring of the proximal radius and distal humerus. Mild anterior joint space  narrowing. Impression IMPRESSION:    1: No acute fracture or dislocation. 2: Mild degenerative changes. As the attending physician, I have reviewed/edited the above report as needed. XR ELBOW RT MIN 3 V   Narrative History: Right elbow pain    FINDINGS: 4 views right elbow obtained. There is no evidence of fracture or  dislocation or joint effusion. There is minimal spurring proximal ulna. There is  7 mm diameter oval-shaped ossification adjacent to and appearing contiguous with  the proximal lateral humeral condyle. Additional small benign focal  calcification between the proximal ulnar and radial shafts. Impression IMPRESSION:    1. No acute osseous finding. 2. Focal 7 mm ossification adjacent to lateral humeral condyle. This may  represent focal area of heterotopic bone formation although could also represent  ossified loose body in the setting of some mild degenerative changes.

## 2017-11-10 ENCOUNTER — TELEPHONE (OUTPATIENT)
Dept: FAMILY MEDICINE CLINIC | Age: 67
End: 2017-11-10

## 2017-11-10 NOTE — TELEPHONE ENCOUNTER
Patient notified her Bone density showed Osteoporosis and Dr Leandro Carlos would like her to start fosamax 70 mg tablet to take once weekly patient voiced understanding

## 2017-11-12 RX ORDER — ALENDRONATE SODIUM 70 MG/1
70 TABLET ORAL
Qty: 12 TAB | Refills: 2 | Status: SHIPPED | OUTPATIENT
Start: 2017-11-12 | End: 2018-05-15 | Stop reason: SDUPTHER

## 2017-12-08 DIAGNOSIS — E78.00 PURE HYPERCHOLESTEROLEMIA: ICD-10-CM

## 2017-12-08 RX ORDER — CHLORTHALIDONE 25 MG/1
25 TABLET ORAL DAILY
Qty: 90 TAB | Refills: 1 | Status: SHIPPED | OUTPATIENT
Start: 2017-12-08 | End: 2018-07-10 | Stop reason: SDUPTHER

## 2017-12-08 RX ORDER — DOCUSATE SODIUM 100 MG/1
100 CAPSULE, LIQUID FILLED ORAL 2 TIMES DAILY
Qty: 180 CAP | Refills: 1 | Status: SHIPPED | OUTPATIENT
Start: 2017-12-08 | End: 2018-01-30 | Stop reason: SDUPTHER

## 2017-12-08 RX ORDER — FENOFIBRATE 54 MG/1
54 TABLET ORAL DAILY
Qty: 90 TAB | Refills: 2 | Status: SHIPPED | OUTPATIENT
Start: 2017-12-08 | End: 2018-09-19 | Stop reason: SDUPTHER

## 2017-12-15 DIAGNOSIS — Z12.39 BREAST CANCER SCREENING: ICD-10-CM

## 2018-01-16 DIAGNOSIS — E78.00 PURE HYPERCHOLESTEROLEMIA: ICD-10-CM

## 2018-01-16 DIAGNOSIS — F32.89 OTHER DEPRESSION: ICD-10-CM

## 2018-01-16 DIAGNOSIS — I10 ESSENTIAL HYPERTENSION: ICD-10-CM

## 2018-01-16 NOTE — TELEPHONE ENCOUNTER
Also requested Advair Diskus (fluticasone propionate 250mcg/salmeterol 50mcg inhalation powder). Bottles showed slight differences in:  atorvaststin calcium and  Possibly Metformin. Please verify these are correct in system, was going off the bottles for this refill request.     Please contact patient once filled.

## 2018-01-17 RX ORDER — LISINOPRIL 40 MG/1
40 TABLET ORAL DAILY
Qty: 90 TAB | Refills: 2 | Status: SHIPPED | OUTPATIENT
Start: 2018-01-17 | End: 2018-12-31 | Stop reason: SDUPTHER

## 2018-01-17 RX ORDER — METFORMIN HYDROCHLORIDE 1000 MG/1
1000 TABLET ORAL 2 TIMES DAILY WITH MEALS
Qty: 180 TAB | Refills: 2 | Status: SHIPPED | OUTPATIENT
Start: 2018-01-17 | End: 2018-12-05 | Stop reason: SDUPTHER

## 2018-01-17 RX ORDER — ATORVASTATIN CALCIUM 10 MG/1
10 TABLET, FILM COATED ORAL DAILY
Qty: 90 TAB | Refills: 2 | Status: SHIPPED | OUTPATIENT
Start: 2018-01-17 | End: 2018-07-10 | Stop reason: SDUPTHER

## 2018-01-17 RX ORDER — PAROXETINE HYDROCHLORIDE 40 MG/1
40 TABLET, FILM COATED ORAL DAILY
Qty: 90 TAB | Refills: 2 | Status: SHIPPED | OUTPATIENT
Start: 2018-01-17 | End: 2018-11-01 | Stop reason: SDUPTHER

## 2018-01-17 NOTE — TELEPHONE ENCOUNTER
Spoke with patient and clarified doses for metformin , lipitor and advair . Patient day the advair she was seen at [facility] and given a script for 250/50 mcg and just ran out . paxil 3/10/2017 90 with 2 lisinopril 10/23/17 90 with 2 . Lipitor 3/22/2017 90 with 2 ,metformin 3/22/17 90 with 2 .

## 2018-01-26 NOTE — TELEPHONE ENCOUNTER
Patient is requesting to have a perscription for Advair Diskus (fluticasone propionate 250mcg/salmeterol 50mcg inhalation powder).      Future Appointments  Date Time Provider Davion Watres   1/30/2018 1:00 PM Daniele Power MD [facility], [place]     Please assist

## 2018-01-29 RX ORDER — FLUTICASONE PROPIONATE AND SALMETEROL 250; 50 UG/1; UG/1
1 POWDER RESPIRATORY (INHALATION) EVERY 12 HOURS
Qty: 1 INHALER | Refills: 0 | Status: SHIPPED | OUTPATIENT
Start: 2018-01-29 | End: 2018-01-30 | Stop reason: SDUPTHER

## 2018-01-30 ENCOUNTER — OFFICE VISIT (OUTPATIENT)
Dept: FAMILY MEDICINE CLINIC | Age: 68
End: 2018-01-30

## 2018-01-30 VITALS
TEMPERATURE: 97.8 F | SYSTOLIC BLOOD PRESSURE: 118 MMHG | HEIGHT: 61 IN | RESPIRATION RATE: 20 BRPM | WEIGHT: 137.2 LBS | HEART RATE: 100 BPM | BODY MASS INDEX: 25.9 KG/M2 | OXYGEN SATURATION: 98 % | DIASTOLIC BLOOD PRESSURE: 58 MMHG

## 2018-01-30 DIAGNOSIS — Z00.00 INITIAL MEDICARE ANNUAL WELLNESS VISIT: Primary | ICD-10-CM

## 2018-01-30 DIAGNOSIS — J45.40 MODERATE PERSISTENT ASTHMA WITHOUT COMPLICATION: ICD-10-CM

## 2018-01-30 DIAGNOSIS — Z13.6 SCREENING FOR ISCHEMIC HEART DISEASE: ICD-10-CM

## 2018-01-30 DIAGNOSIS — Z13.39 SCREENING FOR ALCOHOLISM: ICD-10-CM

## 2018-01-30 DIAGNOSIS — R51.9 PERSISTENT HEADACHES: ICD-10-CM

## 2018-01-30 DIAGNOSIS — E03.9 ACQUIRED HYPOTHYROIDISM: ICD-10-CM

## 2018-01-30 DIAGNOSIS — Z13.1 SCREENING FOR DIABETES MELLITUS: ICD-10-CM

## 2018-01-30 RX ORDER — POTASSIUM CHLORIDE 750 MG/1
10 TABLET, EXTENDED RELEASE ORAL DAILY
Qty: 90 TAB | Refills: 2 | Status: SHIPPED | OUTPATIENT
Start: 2018-01-30 | End: 2018-05-01 | Stop reason: SDUPTHER

## 2018-01-30 RX ORDER — GABAPENTIN 100 MG/1
100 CAPSULE ORAL 3 TIMES DAILY
Qty: 270 CAP | Refills: 2 | Status: SHIPPED | OUTPATIENT
Start: 2018-01-30 | End: 2018-03-30 | Stop reason: SDUPTHER

## 2018-01-30 RX ORDER — LEVOTHYROXINE SODIUM 50 UG/1
50 TABLET ORAL
Qty: 90 TAB | Refills: 2 | Status: SHIPPED | OUTPATIENT
Start: 2018-01-30 | End: 2018-07-10 | Stop reason: SDUPTHER

## 2018-01-30 RX ORDER — FLUTICASONE PROPIONATE AND SALMETEROL 250; 50 UG/1; UG/1
1 POWDER RESPIRATORY (INHALATION) EVERY 12 HOURS
Qty: 3 INHALER | Refills: 2 | Status: SHIPPED | OUTPATIENT
Start: 2018-01-30 | End: 2019-01-15 | Stop reason: SDUPTHER

## 2018-01-30 RX ORDER — PROPRANOLOL HYDROCHLORIDE 60 MG/1
60 CAPSULE, EXTENDED RELEASE ORAL DAILY
Qty: 90 CAP | Refills: 2 | Status: SHIPPED | OUTPATIENT
Start: 2018-01-30 | End: 2018-08-20 | Stop reason: SDUPTHER

## 2018-01-30 RX ORDER — DOCUSATE SODIUM 100 MG/1
100 CAPSULE, LIQUID FILLED ORAL 2 TIMES DAILY
Qty: 180 CAP | Refills: 2 | Status: SHIPPED | OUTPATIENT
Start: 2018-01-30 | End: 2018-03-30 | Stop reason: SDUPTHER

## 2018-01-30 NOTE — PATIENT INSTRUCTIONS

## 2018-01-30 NOTE — MR AVS SNAPSHOT
[address]  [address] 1291 [facility] [address] [phone] Patient: Artem Austin MRN: [id number] TQR:[id number]/8/[id number] Visit Information Date & Time Provider Department Dept. Phone Encounter #  
 1/30/2018  1:00 PM Keon Garcia MD [employer] [phone] [id number] Follow-up Instructions Return in about 4 months (around 5/30/2018), or if symptoms worsen or fail to improve. Upcoming Health Maintenance Date Due Pneumococcal 65+ Low/Medium Risk (1 of 2 - PCV13) 4/5/2016 EYE EXAM RETINAL OR DILATED Q1 12/7/2017 MEDICARE YEARLY EXAM 1/19/2018 HEMOGLOBIN A1C Q6M 4/24/2018 FOOT EXAM Q1 10/23/2018 MICROALBUMIN Q1 10/24/2018 LIPID PANEL Q1 10/24/2018 GLAUCOMA SCREENING Q2Y 12/7/2018 BREAST CANCER SCRN MAMMOGRAM 11/16/2019 DTaP/Tdap/Td series (2 - Td) 8/11/2020 COLONOSCOPY 12/21/2021 Allergies as of 1/30/2018  Review Complete On: 1/30/2018 By: Zonia Parham LPN Severity Noted Reaction Type Reactions Codeine  04/01/2016    Nausea and Vomiting Current Immunizations  Reviewed on 1/18/2017 Name Date Influenza High Dose Vaccine PF 10/23/2017  1:51 PM, 8/25/2016 11:56 AM  
 Influenza Vaccine 10/23/2015 Pneumococcal Polysaccharide (PPSV-23) 11/14/2014 Tdap 8/11/2010 Zoster Vaccine, Live 7/20/2011 Not reviewed this visit You Were Diagnosed With   
  
 Codes Comments Moderate persistent asthma without complication    -  Primary ICD-10-CM: J45.40 ICD-9-CM: 493.90 Acquired hypothyroidism     ICD-10-CM: E03.9 ICD-9-CM: 244.9 Persistent headaches     ICD-10-CM: R51 ICD-9-CM: 784.0 Initial Medicare annual wellness visit     ICD-10-CM: Z00.00 ICD-9-CM: V70.0 Screening for alcoholism     ICD-10-CM: Z13.89 ICD-9-CM: V79.1 Screening for diabetes mellitus     ICD-10-CM: Z13.1 ICD-9-CM: V77.1 Screening for ischemic heart disease     ICD-10-CM: Z13.6 ICD-9-CM: V81.0 Vitals BP Pulse Temp Resp Height(growth percentile) Weight(growth percentile) 118/58 (BP 1 Location: Left arm, BP Patient Position: Sitting) 100 97.8 °F (36.6 °C) (Oral) 20 5' 1\" (1.549 m) 137 lb 3.2 oz (62.2 kg) SpO2 BMI OB Status Smoking Status 98% 25.92 kg/m2 Postmenopausal Never Smoker Vitals History BMI and BSA Data Body Mass Index Body Surface Area  
 25.92 kg/m 2 1.64 m 2 Preferred Pharmacy Pharmacy Name Phone [address], [place] [phone] Your Updated Medication List  
  
   
This list is accurate as of: 1/30/18  1:30 PM.  Always use your most recent med list.  
  
  
  
  
 albuterol 90 mcg/actuation inhaler Commonly known as:  PROVENTIL HFA, VENTOLIN HFA, PROAIR HFA Take  by inhalation. alendronate 70 mg tablet Commonly known as:  FOSAMAX Take 1 Tab by mouth every seven (7) days. atorvastatin 10 mg tablet Commonly known as:  LIPITOR Take 1 Tab by mouth daily. chlorthalidone 25 mg tablet Commonly known as:  Olene Rouge Take 1 Tab by mouth daily. CINNAMON 500 mg Cap Generic drug:  cinnamon bark Take  by mouth. diclofenac 1 % Gel Commonly known as:  VOLTAREN Apply  to affected area four (4) times daily. docusate sodium 100 mg capsule Commonly known as:  Alfonse Kras Take 1 Cap by mouth two (2) times a day. fenofibrate 54 mg tablet Commonly known as:  LOFIBRA Take 1 Tab by mouth daily. fluticasone-salmeterol 250-50 mcg/dose diskus inhaler Commonly known as:  ADVAIR Take 1 Puff by inhalation every twelve (12) hours. gabapentin 100 mg capsule Commonly known as:  NEURONTIN Take 1 Cap by mouth three (3) times daily. garlic 3,285 mg Cap Take  by mouth. OFELIA EXTRACT 250 mg Cap Generic drug:  ginger (Zingiber officinalis) Take  by mouth.  
  
 levothyroxine 50 mcg tablet Commonly known as:  SYNTHROID Take 1 Tab by mouth Daily (before breakfast). lisinopril 40 mg tablet Commonly known as:  Izabela Kalata Take 1 Tab by mouth daily. meclizine 25 mg tablet Commonly known as:  ANTIVERT Take 1 Tab by mouth three (3) times daily as needed. metFORMIN 1,000 mg tablet Commonly known as:  GLUCOPHAGE Take 1 Tab by mouth two (2) times daily (with meals). MIRALAX 17 gram packet Generic drug:  polyethylene glycol Take 17 g by mouth daily. OCUVITE ADULT 50+ PO Take  by mouth daily. Omega-3-DHA-EPA-Fish Oil 1,000 mg (120 mg-180 mg) Cap Take  by mouth daily. ondansetron hcl 4 mg tablet Commonly known as:  ZOFRAN (AS HYDROCHLORIDE) Take 1 Tab by mouth every eight (8) hours as needed for Nausea. PARoxetine 40 mg tablet Commonly known as:  PAXIL Take 1 Tab by mouth daily. potassium chloride 10 mEq tablet Commonly known as:  KLOR-CON Take 1 Tab by mouth daily. propranolol LA 60 mg SR capsule Commonly known as:  INDERAL LA Take 1 Cap by mouth daily. Indications: MIGRAINE PREVENTION SITagliptin 100 mg tablet Commonly known as:  Rentz Abu Take 1 Tab by mouth daily. Prescriptions Sent to Pharmacy Refills  
 potassium chloride (KLOR-CON) 10 mEq tablet 2 Sig: Take 1 Tab by mouth daily. Class: Normal  
 Pharmacy: [address] Ph #: [phone] Route: Oral  
 levothyroxine (SYNTHROID) 50 mcg tablet 2 Sig: Take 1 Tab by mouth Daily (before breakfast). Class: Normal  
 Pharmacy: [address] Ph #: [phone] Route: Oral  
 gabapentin (NEURONTIN) 100 mg capsule 2 Sig: Take 1 Cap by mouth three (3) times daily.   
 Class: Normal  
 Pharmacy: [facility] [address] [facility] [address], [place] Ph #: [phone] Route: Oral  
 propranolol LA (INDERAL LA) 60 mg SR capsule 2 Sig: Take 1 Cap by mouth daily. Indications: MIGRAINE PREVENTION Class: Normal  
 Pharmacy: [address], [place] Ph #: [phone] Route: Oral  
 docusate sodium (COLACE) 100 mg capsule 2 Sig: Take 1 Cap by mouth two (2) times a day. Class: Normal  
 Pharmacy: [address], [place] Ph #: [phone] Route: Oral  
 fluticasone-salmeterol (ADVAIR) 250-50 mcg/dose diskus inhaler 2 Sig: Take 1 Puff by inhalation every twelve (12) hours. Class: Normal  
 Pharmacy: [address], [place] Ph #: [phone] Route: Inhalation We Performed the Following [place] ANNUAL ALCOHOL SCREEN 15 MIN [id number] [facility]] Follow-up Instructions Return in about 4 months (around 5/30/2018), or if symptoms worsen or fail to improve. Patient Instructions Medicare Wellness Visit, Female The best way to live healthy is to have a healthy lifestyle by eating a well-balanced diet, exercising regularly, limiting alcohol and stopping smoking. Regular physical exams and screening tests are another way to keep healthy. Preventive exams provided by your health care provider can find health problems before they become diseases or illnesses. Preventive services including immunizations, screening tests, monitoring and exams can help you take care of your own health. All people over age 72 should have a pneumovax  and and a prevnar shot to prevent pneumonia. These are once in a lifetime unless you and your provider decide differently. All people over 65 should have a yearly flu shot and a tetanus vaccine every 10 years. A bone mass density to screen for osteoporosis or thinning of the bones should be done every 2 years after 65. Screening for diabetes mellitus with a blood sugar test should be done every year. Glaucoma is a disease of the eye due to increased ocular pressure that can lead to blindness and it should be done every year by an eye professional. 
 
Cardiovascular screening tests that check for elevated lipids (fatty part of blood) which can lead to heart disease and strokes should be done every 5 years. Colorectal screening that evaluates for blood or polyps in your colon should be done yearly as a stool test or every five years as a flexible sigmoidoscope or every 10 years as a colonoscopy up to age 76. Breast cancer screening with a mammogram is recommended biennially  for women age 54-69. Screening for cervical cancer with a pap smear and pelvic exam is recommended for women after age 72 years every 2 years up to age 79 or when the provider and patient decide to stop. If there is a history of cervical abnormalities or other increased risk for cancer then the test is recommended yearly. Hepatitis C screening is also recommended for anyone born between [birth year] through [address]. A shingles vaccine is also recommended once in a lifetime after age 61. Your Medicare Wellness Exam is recommended annually. Here is a list of your current Health Maintenance items with a due date: 
Health Maintenance Due Topic Date Due  Pneumococcal Vaccine (1 of 2 - PCV13) 04/05/2016 [facility] Eye Exam  12/07/2017 [facility] Annual Well Visit  01/19/2018 Introducing [facility]! Angelica Alcantara introduces [employer] patient portal. Now you can access parts of your medical record, email your doctor's office, and request medication refills online. 1. In your internet browser, go to [email or web address] 2. Click on the First Time User? Click Here link in the Sign In box.  You will see the New Member Sign Up page. 3. Enter your [facility] Access Code exactly as it appears below. You will not need to use this code after youve completed the sign-up process. If you do not sign up before the expiration date, you must request a new code. · [facility] Access Code: [email or web address] Expires: 4/30/2018  1:30 PM 
 
4. Enter the last four digits of your Social Security Number (xxxx) and Date of Birth (mm/dd/yyyy) as indicated and click Submit. You will be taken to the next sign-up page. 5. Create a [employer] ID. This will be your [facility] login ID and cannot be changed, so think of one that is secure and easy to remember. 6. Create a [facility] password. You can change your password at any time. 7. Enter your Password Reset Question and Answer. This can be used at a later time if you forget your password. 8. Enter your e-mail address. You will receive e-mail notification when new information is available in [address] 9. Click Sign Up. You can now view and download portions of your medical record. 10. Click the Download Summary menu link to download a portable copy of your medical information. If you have questions, please visit the Frequently Asked Questions section of the [employer] website. Remember, [facility] is NOT to be used for urgent needs. For medical emergencies, dial 911. Now available from your iPhone and Android! Please provide this summary of care documentation to your next provider. Your primary care clinician is listed as H[facility]. If you have any questions after today's visit, please call [phone].

## 2018-01-30 NOTE — PROGRESS NOTES
Mally Gardner is a 78 y.o. female (: 1950) presenting to     Chief Complaint   Patient presents with    Annual Wellness Visit       Vitals:    18 1307   BP: 146/64   Pulse: 100   Resp: 20   Temp: 97.8 °F (36.6 °C)   TempSrc: Oral   SpO2: 98%   Weight: 137 lb 3.2 oz (62.2 kg)   Height: 5' 1\" (1.549 m)   PainSc:   0 - No pain       Hearing/Vision:      Visual Acuity Screening    Right eye Left eye Both eyes   Without correction: 20/25 20/40 20/25   With correction:          Learning Assessment:     Learning Assessment 2016   PRIMARY LEARNER Patient   HIGHEST LEVEL OF EDUCATION - PRIMARY LEARNER  SOME COLLEGE   PRIMARY LANGUAGE ENGLISH   LEARNER PREFERENCE PRIMARY DEMONSTRATION   ANSWERED BY patient   RELATIONSHIP SELF     Depression Screening:     PHQ over the last two weeks 2018   PHQ Not Done -   Little interest or pleasure in doing things Not at all   Feeling down, depressed or hopeless Not at all   Total Score PHQ 2 0     Fall Risk Assessment:     Fall Risk Assessment, last 12 mths 2018   Able to walk? Yes   Fall in past 12 months? No   Fall with injury? -   Number of falls in past 12 months -   Fall Risk Score -     Abuse Screening:     Abuse Screening Questionnaire 2018   Do you ever feel afraid of your partner? N   Are you in a relationship with someone who physically or mentally threatens you? N   Is it safe for you to go home? Y     Coordination of Care Questionaire:   1. Have you been to the ER, urgent care clinic since your last visit? Hospitalized since your last visit? no    2. Have you seen or consulted any other health care providers outside of the [address] [place] [address] since your last visit? Include any pap smears or colon screening. no    Advanced Directive:   1. Do you have an Advanced Directive? NO    2. Would you like information on Advanced Directives?  YES    Health Maintenance Due   Topic Date Due    Pneumococcal 65+ Low/Medium Risk (1 of 2 - PCV13) 04/05/2016    EYE EXAM RETINAL OR DILATED Q1  12/07/2017    MEDICARE YEARLY EXAM  01/19/2018

## 2018-01-30 NOTE — PROGRESS NOTES
This is an Initial Medicare Annual Wellness Exam (AWV) (Performed 12 months after IPPE or effective date of Medicare Part B enrollment, Once in a lifetime)     I have reviewed the patient's medical history in detail and updated the computerized patient record. History     Past Medical History:   Diagnosis Date    Acquired hypothyroidism 4/1/2016    Asthma 4/1/2016    Depression     Diabetes (HCC)     Diabetic neuropathy (HCC)     feet    Dizziness     GERD (gastroesophageal reflux disease)     HLD (hyperlipidemia) 4/1/2016    Hypercholesterolemia     Hypertension     Sleep disturbance 7/25/2016    Thyroid disease       Past Surgical History:   Procedure Laterality Date    HX APPENDECTOMY  07/14/2016    Dr. Yonny Dee HX COLONOSCOPY  12/12/2016    Repeat colonoscopy in 5 years per Dr. Boris Polanco; last 12/12/16    HX HERNIA REPAIR  09/2017    Dr. Darron Goldberg. Current Outpatient Prescriptions   Medication Sig Dispense Refill    potassium chloride (KLOR-CON) 10 mEq tablet Take 1 Tab by mouth daily. 90 Tab 2    levothyroxine (SYNTHROID) 50 mcg tablet Take 1 Tab by mouth Daily (before breakfast). 90 Tab 2    gabapentin (NEURONTIN) 100 mg capsule Take 1 Cap by mouth three (3) times daily. 270 Cap 2    propranolol LA (INDERAL LA) 60 mg SR capsule Take 1 Cap by mouth daily. Indications: MIGRAINE PREVENTION 90 Cap 2    docusate sodium (COLACE) 100 mg capsule Take 1 Cap by mouth two (2) times a day. 180 Cap 2    fluticasone-salmeterol (ADVAIR) 250-50 mcg/dose diskus inhaler Take 1 Puff by inhalation every twelve (12) hours. 3 Inhaler 2    PARoxetine (PAXIL) 40 mg tablet Take 1 Tab by mouth daily. 90 Tab 2    lisinopril (PRINIVIL, ZESTRIL) 40 mg tablet Take 1 Tab by mouth daily. 90 Tab 2    atorvastatin (LIPITOR) 10 mg tablet Take 1 Tab by mouth daily.  90 Tab 2    metFORMIN (GLUCOPHAGE) 1,000 mg tablet Take 1 Tab by mouth two (2) times daily (with meals). 180 Tab 2    chlorthalidone (HYGROTEN) 25 mg tablet Take 1 Tab by mouth daily. 90 Tab 1    fenofibrate (LOFIBRA) 54 mg tablet Take 1 Tab by mouth daily. 90 Tab 2    SITagliptin (JANUVIA) 100 mg tablet Take 1 Tab by mouth daily. 90 Tab 1    alendronate (FOSAMAX) 70 mg tablet Take 1 Tab by mouth every seven (7) days. 12 Tab 2    diclofenac (VOLTAREN) 1 % gel Apply  to affected area four (4) times daily. 100 g 1    polyethylene glycol (MIRALAX) 17 gram packet Take 17 g by mouth daily.  cinnamon bark (CINNAMON) 500 mg cap Take  by mouth.  garlic 7,208 mg cap Take  by mouth.  ginger, Zingiber officinalis, (GINGER EXTRACT) 250 mg cap Take  by mouth.  ondansetron hcl (ZOFRAN, AS HYDROCHLORIDE,) 4 mg tablet Take 1 Tab by mouth every eight (8) hours as needed for Nausea. 60 Tab 1    meclizine (ANTIVERT) 25 mg tablet Take 1 Tab by mouth three (3) times daily as needed. 60 Tab 1    Omega-3-DHA-EPA-Fish Oil 1,000 mg (120 mg-180 mg) cap Take  by mouth daily.  ANTIOX #11/OM3/DHA/EPA/LUT/SHASHANK (OCUVITE ADULT 50+ PO) Take  by mouth daily.  albuterol (PROVENTIL HFA, VENTOLIN HFA, PROAIR HFA) 90 mcg/actuation inhaler Take  by inhalation.        Allergies   Allergen Reactions    Codeine Nausea and Vomiting     Family History   Problem Relation Age of Onset    Diabetes Mother     Heart Disease Mother     Thyroid Disease Sister      Social History   Substance Use Topics    Smoking status: Never Smoker    Smokeless tobacco: Never Used    Alcohol use Yes     Patient Active Problem List   Diagnosis Code    Hypertension I10    Diabetes ([facility] [address] E11.9    GERD (gastroesophageal reflux disease) K21.9    Depression F32.9    HLD (hyperlipidemia) E78.5    Acquired hypothyroidism E03.9    Dizziness R42    Asthma J45.909    Sleep disturbance G47.9       Depression Risk Factor Screening:     PHQ over the last two weeks 1/30/2018   PHQ Not Done -   Little interest or pleasure in doing things Not at all   Feeling down, depressed or hopeless Not at all   Total Score PHQ 2 0     Alcohol Risk Factor Screening: You do not drink alcohol or very rarely. Functional Ability and Level of Safety:     Hearing Loss  Hearing is good. Activities of Daily Living  The home contains: no safety equipment. Patient does total self care    Fall Risk  Fall Risk Assessment, last 12 mths 1/30/2018   Able to walk? Yes   Fall in past 12 months? No   Fall with injury? -   Number of falls in past 12 months -   Fall Risk Score -       Abuse Screen  Patient is not abused    Cognitive Screening   Evaluation of Cognitive Function:  Has your family/caregiver stated any concerns about your memory: no  Normal    Patient Care Team   Patient Care Team:  Shane Perkins MD as PCP - General (Internal Medicine)  Romaine Vargas NP (Nurse Practitioner)  Norberto Sanches MD (Colon and Rectal Surgery)    Assessment/Plan   Education and counseling provided:  Are appropriate based on today's review and evaluation    Diagnoses and all orders for this visit:    1. Initial Medicare annual wellness visit    2. Acquired hypothyroidism  -     levothyroxine (SYNTHROID) 50 mcg tablet; Take 1 Tab by mouth Daily (before breakfast). 3. Persistent headaches  -     propranolol LA (INDERAL LA) 60 mg SR capsule; Take 1 Cap by mouth daily. Indications: MIGRAINE PREVENTION    4. Moderate persistent asthma without complication    5. Screening for alcoholism  -     Annual  Alcohol Screen 15 min ()    6. Screening for diabetes mellitus    7. Screening for ischemic heart disease    Other orders  -     potassium chloride (KLOR-CON) 10 mEq tablet; Take 1 Tab by mouth daily.  -     gabapentin (NEURONTIN) 100 mg capsule; Take 1 Cap by mouth three (3) times daily. -     docusate sodium (COLACE) 100 mg capsule; Take 1 Cap by mouth two (2) times a day. -     fluticasone-salmeterol (ADVAIR) 250-50 mcg/dose diskus inhaler;  Take 1 Puff by inhalation every twelve (12) hours.          Health Maintenance Due   Topic Date Due    Pneumococcal 65+ Low/Medium Risk (1 of 2 - PCV13) 04/05/2016    EYE EXAM RETINAL OR DILATED Q1  12/07/2017    MEDICARE YEARLY EXAM  01/19/2018

## 2018-02-22 ENCOUNTER — OFFICE VISIT (OUTPATIENT)
Dept: FAMILY MEDICINE CLINIC | Age: 68
End: 2018-02-22

## 2018-02-22 VITALS
DIASTOLIC BLOOD PRESSURE: 66 MMHG | HEIGHT: 61 IN | SYSTOLIC BLOOD PRESSURE: 124 MMHG | BODY MASS INDEX: 25.83 KG/M2 | HEART RATE: 66 BPM | WEIGHT: 136.8 LBS | OXYGEN SATURATION: 98 % | RESPIRATION RATE: 20 BRPM | TEMPERATURE: 98.1 F

## 2018-02-22 DIAGNOSIS — J06.9 UPPER RESPIRATORY TRACT INFECTION, UNSPECIFIED TYPE: Primary | ICD-10-CM

## 2018-02-22 DIAGNOSIS — R06.2 WHEEZING: ICD-10-CM

## 2018-02-22 RX ORDER — PREDNISONE 10 MG/1
TABLET ORAL
Qty: 21 TAB | Refills: 0 | Status: SHIPPED | OUTPATIENT
Start: 2018-02-22 | End: 2018-05-15 | Stop reason: ALTCHOICE

## 2018-02-22 RX ORDER — DOXYCYCLINE 100 MG/1
100 TABLET ORAL 2 TIMES DAILY
Qty: 20 TAB | Refills: 0 | Status: SHIPPED | OUTPATIENT
Start: 2018-02-22 | End: 2018-03-04

## 2018-02-22 RX ORDER — IPRATROPIUM BROMIDE AND ALBUTEROL SULFATE 2.5; .5 MG/3ML; MG/3ML
3 SOLUTION RESPIRATORY (INHALATION)
Qty: 1 NEBULE | Refills: 0 | Status: SHIPPED | OUTPATIENT
Start: 2018-02-22 | End: 2018-02-22

## 2018-02-22 NOTE — PROGRESS NOTES
Assessment/Plan:    *Diagnoses and all orders for this visit:    1. Upper respiratory tract infection, unspecified type  -     doxycycline (ADOXA) 100 mg tablet; Take 1 Tab by mouth two (2) times a day for 10 days. -     predniSONE (DELTASONE) 10 mg tablet; 40 mg daily x 2 days, 30 mg daily x 2 days, 20 mg daily x 2 days, 10 mg daily x 2 days, 5 mg daily x 2 days. The plan was discussed with the patient. The patient verbalized understanding and is in agreement with the plan. All medication potential side effects were discussed with the patient.    -------------------------------------------------------------------------------------------------------------------        Aleta Marie is a 78 y.o. female and presents with Cough and Wheezing         Subjective:  Pt here for 2 weeks of cough, +phlegm, + wheezing, + dizziness, + sinus congestion. ROS:  Constitutional: No recent weight change. No weakness/fatigue. No f/c. Skin: No rashes, change in nails/hair, itching   HENT: No HA, dizziness. No hearing loss/tinnitus. No nasal congestion/discharge. Eyes: No change in vision, double/blurred vision or eye pain/redness. Cardiovascular: No CP/palpitations. No CRABTREE/orthopnea/PND. Respiratory: No cough/sputum, dyspnea, wheezing. Gastointestinal: No dysphagia, reflux. No n/v. No constipation/diarrhea. No melena/rectal bleeding. Genitourinary: No dysuria, urinary hesitancy, nocturia, hematuria. No incontinence. Musculoskeletal: No joint pain/stiffness. No muscle pain/tenderness. Endo: No heat/cold intolerance, no polyuria/polydypsia. Heme: No h/o anemia. No easy bleeding/bruising. Allergy/Immunology: No seasonal rhinitis. Denies frequent colds, sinus/ear infections. Neurological: No seizures/numbness/weakness. No paresthesias. Psychiatric:  No depression, anxiety. The problem list was updated as a part of today's visit.   Patient Active Problem List   Diagnosis Code    Hypertension I10    Diabetes ([facility] [address] E11.9    GERD (gastroesophageal reflux disease) K21.9    Depression F32.9    HLD (hyperlipidemia) E78.5    Acquired hypothyroidism E03.9    Dizziness R42    Asthma J45.909    Sleep disturbance G47.9       The PSH, FH were reviewed. SH:  Social History   Substance Use Topics    Smoking status: Never Smoker    Smokeless tobacco: Never Used    Alcohol use Yes       Medications/Allergies:  Current Outpatient Prescriptions on File Prior to Visit   Medication Sig Dispense Refill    potassium chloride (KLOR-CON) 10 mEq tablet Take 1 Tab by mouth daily. 90 Tab 2    levothyroxine (SYNTHROID) 50 mcg tablet Take 1 Tab by mouth Daily (before breakfast). 90 Tab 2    gabapentin (NEURONTIN) 100 mg capsule Take 1 Cap by mouth three (3) times daily. 270 Cap 2    propranolol LA (INDERAL LA) 60 mg SR capsule Take 1 Cap by mouth daily. Indications: MIGRAINE PREVENTION 90 Cap 2    docusate sodium (COLACE) 100 mg capsule Take 1 Cap by mouth two (2) times a day. 180 Cap 2    fluticasone-salmeterol (ADVAIR) 250-50 mcg/dose diskus inhaler Take 1 Puff by inhalation every twelve (12) hours. 3 Inhaler 2    PARoxetine (PAXIL) 40 mg tablet Take 1 Tab by mouth daily. 90 Tab 2    lisinopril (PRINIVIL, ZESTRIL) 40 mg tablet Take 1 Tab by mouth daily. 90 Tab 2    atorvastatin (LIPITOR) 10 mg tablet Take 1 Tab by mouth daily. 90 Tab 2    metFORMIN (GLUCOPHAGE) 1,000 mg tablet Take 1 Tab by mouth two (2) times daily (with meals). 180 Tab 2    chlorthalidone (HYGROTEN) 25 mg tablet Take 1 Tab by mouth daily. 90 Tab 1    fenofibrate (LOFIBRA) 54 mg tablet Take 1 Tab by mouth daily. 90 Tab 2    SITagliptin (JANUVIA) 100 mg tablet Take 1 Tab by mouth daily. 90 Tab 1    diclofenac (VOLTAREN) 1 % gel Apply  to affected area four (4) times daily. 100 g 1    polyethylene glycol (MIRALAX) 17 gram packet Take 17 g by mouth daily.  cinnamon bark (CINNAMON) 500 mg cap Take  by mouth.       garlic 5,329 mg cap Take  by mouth.  ginger, Zingiber officinalis, (GINGER EXTRACT) 250 mg cap Take  by mouth.  ondansetron hcl (ZOFRAN, AS HYDROCHLORIDE,) 4 mg tablet Take 1 Tab by mouth every eight (8) hours as needed for Nausea. 60 Tab 1    meclizine (ANTIVERT) 25 mg tablet Take 1 Tab by mouth three (3) times daily as needed. 60 Tab 1    Omega-3-DHA-EPA-Fish Oil 1,000 mg (120 mg-180 mg) cap Take  by mouth daily.  ANTIOX #11/OM3/DHA/EPA/LUT/SHASHANK (OCUVITE ADULT 50+ PO) Take  by mouth daily.  alendronate (FOSAMAX) 70 mg tablet Take 1 Tab by mouth every seven (7) days. 12 Tab 2    albuterol (PROVENTIL HFA, VENTOLIN HFA, PROAIR HFA) 90 mcg/actuation inhaler Take  by inhalation. No current facility-administered medications on file prior to visit. Allergies   Allergen Reactions    Codeine Nausea and Vomiting         Health Maintenance:   Health Maintenance   Topic Date Due    Pneumococcal 65+ Low/Medium Risk (1 of 2 - PCV13) 04/05/2016    EYE EXAM RETINAL OR DILATED Q1  12/07/2017    HEMOGLOBIN A1C Q6M  04/24/2018    FOOT EXAM Q1  10/23/2018    MICROALBUMIN Q1  10/24/2018    LIPID PANEL Q1  10/24/2018    GLAUCOMA SCREENING Q2Y  12/07/2018    MEDICARE YEARLY EXAM  01/31/2019    BREAST CANCER SCRN MAMMOGRAM  11/16/2019    DTaP/Tdap/Td series (2 - Td) 08/11/2020    COLONOSCOPY  12/21/2021    Hepatitis C Screening  Completed    OSTEOPOROSIS SCREENING (DEXA)  Completed    ZOSTER VACCINE AGE 60>  Completed    Influenza Age 5 to Adult  Completed       Objective:  Visit Vitals    /66 (BP 1 Location: Left arm, BP Patient Position: Sitting)    Pulse 66    Temp 98.1 °F (36.7 °C) (Oral)    Resp 20    Ht 5' 1\" (1.549 m)    Wt 136 lb 12.8 oz (62.1 kg)    SpO2 98%    BMI 25.85 kg/m2          Nurses notes and VS reviewed.       Physical Examination: General appearance - ill-appearing  Ears - bilateral TM's and external ear canals normal  Nose - mucosal congestion  Mouth - erythematous  Neck - supple, no significant adenopathy  Chest - wheezing noted diffusely, rhonchi noted scattered  Heart - normal rate, regular rhythm, normal S1, S2, no murmurs, rubs, clicks or gallops        Labwork and Ancillary Studies:    CBC w/Diff  Lab Results   Component Value Date/Time    WBC 8.5 10/24/2017 10:39 AM    HGB 11.2 (L) 10/24/2017 10:39 AM    PLATELET 143 15/50/0686 10:39 AM         Basic Metabolic Profile  Lab Results   Component Value Date/Time    Sodium 136 10/24/2017 10:39 AM    Potassium 3.4 (L) 10/24/2017 10:39 AM    Chloride 97 (L) 10/24/2017 10:39 AM    CO2 27 10/24/2017 10:39 AM    Anion gap 12 10/24/2017 10:39 AM    Glucose 216 (H) 10/24/2017 10:39 AM    BUN 10 10/24/2017 10:39 AM    Creatinine 0.66 10/24/2017 10:39 AM    BUN/Creatinine ratio 15 10/24/2017 10:39 AM    GFR est AA >60 10/24/2017 10:39 AM    GFR est non-AA >60 10/24/2017 10:39 AM    Calcium 9.2 10/24/2017 10:39 AM         LFT  Lab Results   Component Value Date/Time    ALT (SGPT) 19 10/24/2017 10:39 AM    AST (SGOT) 11 (L) 10/24/2017 10:39 AM    Alk.  phosphatase 45 10/24/2017 10:39 AM    Bilirubin, direct 0.1 10/24/2017 10:39 AM    Bilirubin, total 0.3 10/24/2017 10:39 AM         Cholesterol  Lab Results   Component Value Date/Time    Cholesterol, total 132 10/24/2017 10:39 AM    HDL Cholesterol 45 10/24/2017 10:39 AM    LDL, calculated 64.2 10/24/2017 10:39 AM    Triglyceride 114 10/24/2017 10:39 AM    CHOL/HDL Ratio 2.9 10/24/2017 10:39 AM

## 2018-02-22 NOTE — MR AVS SNAPSHOT
[address]  [address] [address] [facility] [address] [phone] Patient: Angel Camarena MRN: [id number] JADA:[birth year] Visit Information Date & Time Provider Department Dept. Phone Encounter #  
 2/22/2018  2:30 PM Steven Jarquin [phone] [id number] Upcoming Health Maintenance Date Due Pneumococcal 65+ Low/Medium Risk (1 of 2 - PCV13) 4/5/2016 EYE EXAM RETINAL OR DILATED Q1 12/7/2017 HEMOGLOBIN A1C Q6M 4/24/2018 FOOT EXAM Q1 10/23/2018 MICROALBUMIN Q1 10/24/2018 LIPID PANEL Q1 10/24/2018 GLAUCOMA SCREENING Q2Y 12/7/2018 MEDICARE YEARLY EXAM 1/31/2019 BREAST CANCER SCRN MAMMOGRAM 11/16/2019 DTaP/Tdap/Td series (2 - Td) 8/11/2020 COLONOSCOPY 12/21/2021 Allergies as of 2/22/2018  Review Complete On: 2/22/2018 By: Cathy Rachel LPN Severity Noted Reaction Type Reactions Codeine  04/01/2016    Nausea and Vomiting Current Immunizations  Reviewed on 1/18/2017 Name Date Influenza High Dose Vaccine PF 10/23/2017  1:51 PM, 8/25/2016 11:56 AM  
 Influenza Vaccine 10/23/2015 Pneumococcal Polysaccharide (PPSV-23) 11/14/2014 Tdap 8/11/2010 Zoster Vaccine, Live 7/20/2011 Not reviewed this visit You Were Diagnosed With   
  
 Codes Comments Upper respiratory tract infection, unspecified type    -  Primary ICD-10-CM: J06.9 ICD-9-CM: 465.9 Vitals BP Pulse Temp Resp Height(growth percentile) Weight(growth percentile) 124/66 (BP 1 Location: Left arm, BP Patient Position: Sitting) 66 98.1 °F (36.7 °C) (Oral) 20 5' 1\" (1.549 m) 136 lb 12.8 oz (62.1 kg) SpO2 BMI OB Status Smoking Status 98% 25.85 kg/m2 Postmenopausal Never Smoker BMI and BSA Data Body Mass Index Body Surface Area  
 25.85 kg/m 2 1.63 m 2 Preferred Pharmacy Pharmacy Name Phone [address], [facility] [phone] Your Updated Medication List  
  
   
This list is accurate as of 2/22/18  3:17 PM.  Always use your most recent med list.  
  
  
  
  
 albuterol 90 mcg/actuation inhaler Commonly known as:  PROVENTIL HFA, VENTOLIN HFA, PROAIR HFA Take  by inhalation. alendronate 70 mg tablet Commonly known as:  FOSAMAX Take 1 Tab by mouth every seven (7) days. atorvastatin 10 mg tablet Commonly known as:  LIPITOR Take 1 Tab by mouth daily. chlorthalidone 25 mg tablet Commonly known as:  Monique Warren Take 1 Tab by mouth daily. CINNAMON 500 mg Cap Generic drug:  cinnamon bark Take  by mouth. diclofenac 1 % Gel Commonly known as:  VOLTAREN Apply  to affected area four (4) times daily. docusate sodium 100 mg capsule Commonly known as:  Timothy Queen Take 1 Cap by mouth two (2) times a day. doxycycline 100 mg tablet Commonly known as:  ADOXA Take 1 Tab by mouth two (2) times a day for 10 days. fenofibrate 54 mg tablet Commonly known as:  LOFIBRA Take 1 Tab by mouth daily. fluticasone-salmeterol 250-50 mcg/dose diskus inhaler Commonly known as:  ADVAIR Take 1 Puff by inhalation every twelve (12) hours. gabapentin 100 mg capsule Commonly known as:  NEURONTIN Take 1 Cap by mouth three (3) times daily. garlic 1,346 mg Cap Take  by mouth. GINGER EXTRACT 250 mg Cap Generic drug:  ginger (Zingiber officinalis) Take  by mouth.  
  
 levothyroxine 50 mcg tablet Commonly known as:  SYNTHROID Take 1 Tab by mouth Daily (before breakfast). lisinopril 40 mg tablet Commonly known as:  Kathy Cockayne Take 1 Tab by mouth daily. meclizine 25 mg tablet Commonly known as:  ANTIVERT Take 1 Tab by mouth three (3) times daily as needed. metFORMIN 1,000 mg tablet Commonly known as:  GLUCOPHAGE  
 Take 1 Tab by mouth two (2) times daily (with meals). MIRALAX 17 gram packet Generic drug:  polyethylene glycol Take 17 g by mouth daily. OCUVITE ADULT 50+ PO Take  by mouth daily. Omega-3-DHA-EPA-Fish Oil 1,000 mg (120 mg-180 mg) Cap Take  by mouth daily. ondansetron hcl 4 mg tablet Commonly known as:  ZOFRAN (AS HYDROCHLORIDE) Take 1 Tab by mouth every eight (8) hours as needed for Nausea. PARoxetine 40 mg tablet Commonly known as:  PAXIL Take 1 Tab by mouth daily. potassium chloride 10 mEq tablet Commonly known as:  KLOR-CON Take 1 Tab by mouth daily. predniSONE 10 mg tablet Commonly known as:  DELTASONE  
40 mg daily x 2 days, 30 mg daily x 2 days, 20 mg daily x 2 days, 10 mg daily x 2 days, 5 mg daily x 2 days. propranolol LA 60 mg SR capsule Commonly known as:  INDERAL LA Take 1 Cap by mouth daily. Indications: MIGRAINE PREVENTION SITagliptin 100 mg tablet Commonly known as:  Loi Remedies Take 1 Tab by mouth daily. Prescriptions Sent to Pharmacy Refills  
 doxycycline (ADOXA) 100 mg tablet 0 Sig: Take 1 Tab by mouth two (2) times a day for 10 days. Class: Normal  
 Pharmacy: [address] [place] [address] Ph #: [phone] Route: Oral  
 predniSONE (DELTASONE) 10 mg tablet 0 Si mg daily x 2 days, 30 mg daily x 2 days, 20 mg daily x 2 days, 10 mg daily x 2 days, 5 mg daily x 2 days. Class: Normal  
 Pharmacy: [address] Ph #: [phone] Patient Instructions Upper Respiratory Infection (Cold): Care Instructions Your Care Instructions An upper respiratory infection, or URI, is an infection of the nose, sinuses, or throat. URIs are spread by coughs, sneezes, and direct contact. The common cold is the most frequent kind of URI.  The flu and sinus infections are other kinds of URIs. Almost all URIs are caused by viruses. Antibiotics won't cure them. But you can treat most infections with home care. This may include drinking lots of fluids and taking over-the-counter pain medicine. You will probably feel better in 4 to 10 days. The doctor has checked you carefully, but problems can develop later. If you notice any problems or new symptoms, get medical treatment right away. Follow-up care is a key part of your treatment and safety. Be sure to make and go to all appointments, and call your doctor if you are having problems. It's also a good idea to know your test results and keep a list of the medicines you take. How can you care for yourself at home? · To prevent dehydration, drink plenty of fluids, enough so that your urine is light yellow or clear like water. Choose water and other caffeine-free clear liquids until you feel better. If you have kidney, heart, or liver disease and have to limit fluids, talk with your doctor before you increase the amount of fluids you drink. · Take an over-the-counter pain medicine, such as acetaminophen (Tylenol), ibuprofen (Advil, Motrin), or naproxen (Aleve). Read and follow all instructions on the label. · Before you use cough and cold medicines, check the label. These medicines may not be safe for young children or for people with certain health problems. · Be careful when taking over-the-counter cold or flu medicines and Tylenol at the same time. Many of these medicines have acetaminophen, which is Tylenol. Read the labels to make sure that you are not taking more than the recommended dose. Too much acetaminophen (Tylenol) can be harmful. · Get plenty of rest. 
· Do not smoke or allow others to smoke around you. If you need help quitting, talk to your doctor about stop-smoking programs and medicines. These can increase your chances of quitting for good. When should you call for help? Call 911 anytime you think you may need emergency care. For example, call if: 
? · You have severe trouble breathing. ?Call your doctor now or seek immediate medical care if: 
? · You seem to be getting much sicker. ? · You have new or worse trouble breathing. ? · You have a new or higher fever. ? · You have a new rash. ? Watch closely for changes in your health, and be sure to contact your doctor if: 
? · You have a new symptom, such as a sore throat, an earache, or sinus pain. ? · You cough more deeply or more often, especially if you notice more mucus or a change in the color of your mucus. ? · You do not get better as expected. Where can you learn more? Go to http://bertha-shayla.info/. Enter B244 in the search box to learn more about \"Upper Respiratory Infection (Cold): Care Instructions. \" Current as of: May 12, 2017 Content Version: 11.4 © 3594-1297 [employer]. Care instructions adapted under license by [employer] (which disclaims liability or warranty for this information). If you have questions about a medical condition or this instruction, always ask your healthcare professional. [place] [address] any warranty or liability for your use of this information. Introducing [facility]! [employer] introduces [address] patient portal. Now you can access parts of your medical record, email your doctor's office, and request medication refills online. 1. In your internet browser, go to [email or web address] 2. Click on the First Time User? Click Here link in the Sign In box. You will see the New Member Sign Up page. 3. Enter your [address] Access Code exactly as it appears below. You will not need to use this code after youve completed the sign-up process. If you do not sign up before the expiration date, you must request a new code. · [address] Access Code: [email or web address] Expires: 4/30/2018  1:30 PM 
 
4. Enter the last four digits of your Social Security Number (xxxx) and Date of Birth (mm/dd/yyyy) as indicated and click Submit. You will be taken to the next sign-up page. 5. Create a [employer] ID. This will be your [employer] login ID and cannot be changed, so think of one that is secure and easy to remember. 6. Create a [employer] password. You can change your password at any time. 7. Enter your Password Reset Question and Answer. This can be used at a later time if you forget your password. 8. Enter your e-mail address. You will receive e-mail notification when new information is available in [address] 9. Click Sign Up. You can now view and download portions of your medical record. 10. Click the Download Summary menu link to download a portable copy of your medical information. If you have questions, please visit the Frequently Asked Questions section of the [employer] website. Remember, [employer] is NOT to be used for urgent needs. For medical emergencies, dial 911. Now available from your iPhone and Android! Please provide this summary of care documentation to your next provider. Your primary care clinician is listed as H[address]. If you have any questions after today's visit, please call [phone].

## 2018-02-22 NOTE — PROGRESS NOTES
Randolph Oppenheim is a 78 y.o. female is here for coughing and wheezing. 1. Have you been to the ER, urgent care clinic since your last visit? Hospitalized since your last visit? No    2. Have you seen or consulted any other health care providers outside of the [address] [place] [address] since your last visit? Include any pap smears or colon screening. No     Health Maintenance Due   Topic Date Due    Pneumococcal 65+ Low/Medium Risk (1 of 2 - PCV13) 04/05/2016    EYE EXAM RETINAL OR DILATED Q1  12/07/2017       Per orders of Dr Liseth Nunez, nebulizer treatment of duoneb given by Millie Hassan LPN. Patient instructed to remain in clinic for 20 minutes afterwards, and to report any adverse reaction to me immediately. Medication documentation completed.

## 2018-02-22 NOTE — PATIENT INSTRUCTIONS
Upper Respiratory Infection (Cold): Care Instructions  Your Care Instructions    An upper respiratory infection, or URI, is an infection of the nose, sinuses, or throat. URIs are spread by coughs, sneezes, and direct contact. The common cold is the most frequent kind of URI. The flu and sinus infections are other kinds of URIs. Almost all URIs are caused by viruses. Antibiotics won't cure them. But you can treat most infections with home care. This may include drinking lots of fluids and taking over-the-counter pain medicine. You will probably feel better in 4 to 10 days. The doctor has checked you carefully, but problems can develop later. If you notice any problems or new symptoms, get medical treatment right away. Follow-up care is a key part of your treatment and safety. Be sure to make and go to all appointments, and call your doctor if you are having problems. It's also a good idea to know your test results and keep a list of the medicines you take. How can you care for yourself at home? · To prevent dehydration, drink plenty of fluids, enough so that your urine is light yellow or clear like water. Choose water and other caffeine-free clear liquids until you feel better. If you have kidney, heart, or liver disease and have to limit fluids, talk with your doctor before you increase the amount of fluids you drink. · Take an over-the-counter pain medicine, such as acetaminophen (Tylenol), ibuprofen (Advil, Motrin), or naproxen (Aleve). Read and follow all instructions on the label. · Before you use cough and cold medicines, check the label. These medicines may not be safe for young children or for people with certain health problems. · Be careful when taking over-the-counter cold or flu medicines and Tylenol at the same time. Many of these medicines have acetaminophen, which is Tylenol. Read the labels to make sure that you are not taking more than the recommended dose.  Too much acetaminophen (Tylenol) can be harmful. · Get plenty of rest.  · Do not smoke or allow others to smoke around you. If you need help quitting, talk to your doctor about stop-smoking programs and medicines. These can increase your chances of quitting for good. When should you call for help? Call 911 anytime you think you may need emergency care. For example, call if:  ? · You have severe trouble breathing. ?Call your doctor now or seek immediate medical care if:  ? · You seem to be getting much sicker. ? · You have new or worse trouble breathing. ? · You have a new or higher fever. ? · You have a new rash. ? Watch closely for changes in your health, and be sure to contact your doctor if:  ? · You have a new symptom, such as a sore throat, an earache, or sinus pain. ? · You cough more deeply or more often, especially if you notice more mucus or a change in the color of your mucus. ? · You do not get better as expected. Where can you learn more? Go to http://bertha-shayla.info/. Enter R630 in the search box to learn more about \"Upper Respiratory Infection (Cold): Care Instructions. \"  Current as of: May 12, 2017  Content Version: 11.4  © 3076-4779 [employer]. Care instructions adapted under license by [employer] (which disclaims liability or warranty for this information). If you have questions about a medical condition or this instruction, always ask your healthcare professional. [place] [address] any warranty or liability for your use of this information.

## 2018-04-02 RX ORDER — GABAPENTIN 100 MG/1
100 CAPSULE ORAL 3 TIMES DAILY
Qty: 270 CAP | Refills: 0 | Status: SHIPPED | OUTPATIENT
Start: 2018-04-02 | End: 2018-05-01 | Stop reason: SDUPTHER

## 2018-04-02 RX ORDER — DOCUSATE SODIUM 100 MG/1
100 CAPSULE, LIQUID FILLED ORAL 2 TIMES DAILY
Qty: 180 CAP | Refills: 0 | Status: SHIPPED | OUTPATIENT
Start: 2018-04-02 | End: 2018-05-01 | Stop reason: SDUPTHER

## 2018-04-02 NOTE — TELEPHONE ENCOUNTER
Pt called to request refills. She states that she also needs the rx that helps with dizziness. She does not know the name of the rx only that Dr Giuliano Cobos gave her once.

## 2018-04-02 NOTE — TELEPHONE ENCOUNTER
Patient pharmacy is requesting a med refill of Docusate 100 mg, gabapentin 100 mg  januvia 100 mg    Last visit: 2/22/18    Last refill: 1/30/18

## 2018-05-01 ENCOUNTER — OFFICE VISIT (OUTPATIENT)
Dept: FAMILY MEDICINE CLINIC | Age: 68
End: 2018-05-01

## 2018-05-01 VITALS
HEART RATE: 62 BPM | WEIGHT: 137 LBS | DIASTOLIC BLOOD PRESSURE: 65 MMHG | OXYGEN SATURATION: 97 % | HEIGHT: 61 IN | SYSTOLIC BLOOD PRESSURE: 152 MMHG | RESPIRATION RATE: 17 BRPM | BODY MASS INDEX: 25.86 KG/M2 | TEMPERATURE: 97.9 F

## 2018-05-01 DIAGNOSIS — J30.1 SEASONAL ALLERGIC RHINITIS DUE TO POLLEN: Primary | ICD-10-CM

## 2018-05-01 DIAGNOSIS — J45.40 MODERATE PERSISTENT ASTHMA WITHOUT COMPLICATION: ICD-10-CM

## 2018-05-01 RX ORDER — ALBUTEROL SULFATE 90 UG/1
2 AEROSOL, METERED RESPIRATORY (INHALATION)
Qty: 1 INHALER | Refills: 2 | Status: SHIPPED | OUTPATIENT
Start: 2018-05-01 | End: 2018-09-11 | Stop reason: SDUPTHER

## 2018-05-01 RX ORDER — CETIRIZINE HCL 10 MG
10 TABLET ORAL DAILY
Qty: 90 TAB | Refills: 1 | Status: SHIPPED | OUTPATIENT
Start: 2018-05-01 | End: 2018-10-19 | Stop reason: SDUPTHER

## 2018-05-01 RX ORDER — FLUTICASONE PROPIONATE 50 MCG
2 SPRAY, SUSPENSION (ML) NASAL DAILY
Qty: 1 BOTTLE | Refills: 2 | Status: SHIPPED | OUTPATIENT
Start: 2018-05-01 | End: 2019-09-12 | Stop reason: SDUPTHER

## 2018-05-01 NOTE — PATIENT INSTRUCTIONS
Allergies: Care Instructions  Your Care Instructions    Allergies occur when your body's defense system (immune system) overreacts to certain substances. The immune system treats a harmless substance as if it were a harmful germ or virus. Many things can cause this overreaction, including pollens, medicine, food, dust, animal dander, and mold. Allergies can be mild or severe. Mild allergies can be managed with home treatment. But medicine may be needed to prevent problems. Managing your allergies is an important part of staying healthy. Your doctor may suggest that you have allergy testing to help find out what is causing your allergies. When you know what things trigger your symptoms, you can avoid them. This can prevent allergy symptoms and other health problems. For severe allergies that cause reactions that affect your whole body (anaphylactic reactions), your doctor may prescribe a shot of epinephrine to carry with you in case you have a severe reaction. Learn how to give yourself the shot and keep it with you at all times. Make sure it is not . Follow-up care is a key part of your treatment and safety. Be sure to make and go to all appointments, and call your doctor if you are having problems. It's also a good idea to know your test results and keep a list of the medicines you take. How can you care for yourself at home? · If you have been told by your doctor that dust or dust mites are causing your allergy, decrease the dust around your bed:  [facility] [place] [employer] sheets, pillowcases, and other bedding in hot water every week. ¨ Use dust-proof covers for pillows, duvets, and mattresses. Avoid plastic covers because they tear easily and do not \"breathe. \" Wash as instructed on the label. ¨ Do not use any blankets and pillows that you do not need. ¨ Use blankets that you can wash in your washing machine. ¨ Consider removing drapes and carpets, which attract and hold dust, from your bedroom.   · If you are allergic to house dust and mites, do not use home humidifiers. Your doctor can suggest ways you can control dust and mites. · Look for signs of cockroaches. Cockroaches cause allergic reactions. Use cockroach baits to get rid of them. Then, clean your home well. Cockroaches like areas where grocery bags, newspapers, empty bottles, or cardboard boxes are stored. Do not keep these inside your home, and keep trash and food containers sealed. Seal off any spots where cockroaches might enter your home. · If you are allergic to mold, get rid of furniture, rugs, and drapes that smell musty. Check for mold in the bathroom. · If you are allergic to outdoor pollen or mold spores, use air-conditioning. Change or clean all filters every month. Keep windows closed. · If you are allergic to pollen, stay inside when pollen counts are high. Use a vacuum  with a HEPA filter or a double-thickness filter at least two times each week. · Stay inside when air pollution is bad. Avoid paint fumes, perfumes, and other strong odors. · Avoid conditions that make your allergies worse. Stay away from smoke. Do not smoke or let anyone else smoke in your house. Do not use fireplaces or wood-burning stoves. · If you are allergic to your pets, change the air filter in your furnace every month. Use high-efficiency filters. · If you are allergic to pet dander, keep pets outside or out of your bedroom. Old carpet and cloth furniture can hold a lot of animal dander. You may need to replace them. When should you call for help? Give an epinephrine shot if:  ? · You think you are having a severe allergic reaction. ? · You have symptoms in more than one body area, such as mild nausea and an itchy mouth. ? After giving an epinephrine shot call 911, even if you feel better. ?Call 911 if:  ? · You have symptoms of a severe allergic reaction. These may include:  ¨ Sudden raised, red areas (hives) all over your body.   ¨ Swelling of the throat, mouth, lips, or tongue. ¨ Trouble breathing. ¨ Passing out (losing consciousness). Or you may feel very lightheaded or suddenly feel weak, confused, or restless. ? · You have been given an epinephrine shot, even if you feel better. ?Call your doctor now or seek immediate medical care if:  ? · You have symptoms of an allergic reaction, such as:  ¨ A rash or hives (raised, red areas on the skin). ¨ Itching. ¨ Swelling. ¨ Belly pain, nausea, or vomiting. ? Watch closely for changes in your health, and be sure to contact your doctor if:  ? · You do not get better as expected. Where can you learn more? Go to http://bertha-shayla.info/. Enter T663 in the search box to learn more about \"Allergies: Care Instructions. \"  Current as of: September 29, 2016  Content Version: 11.4  © 9571-6634 [employer]. Care instructions adapted under license by [employer] (which disclaims liability or warranty for this information). If you have questions about a medical condition or this instruction, always ask your healthcare professional. [place] [address] any warranty or liability for your use of this information.

## 2018-05-01 NOTE — PROGRESS NOTES
Sophy Billings is a 80 y.o. female (: 1950) presenting to address:allergies    Chief Complaint   Patient presents with    Allergies       Vitals:    18 1306   BP: 152/65   Pulse: 62   Resp: 17   Temp: 97.9 °F (36.6 °C)   TempSrc: Oral   SpO2: 97%   Weight: 137 lb (62.1 kg)   Height: 5' 1\" (1.549 m)   PainSc:   5   PainLoc: Head       Hearing/Vision:   No exam data present    Learning Assessment:     Learning Assessment 2016   PRIMARY LEARNER Patient   HIGHEST LEVEL OF EDUCATION - PRIMARY LEARNER  SOME COLLEGE   PRIMARY LANGUAGE ENGLISH   LEARNER PREFERENCE PRIMARY DEMONSTRATION   ANSWERED BY patient   RELATIONSHIP SELF     Depression Screening:     PHQ over the last two weeks 2018   PHQ Not Done -   Little interest or pleasure in doing things Not at all   Feeling down, depressed or hopeless Not at all   Total Score PHQ 2 0     Fall Risk Assessment:     Fall Risk Assessment, last 12 mths 2018   Able to walk? Yes   Fall in past 12 months? No   Fall with injury? -   Number of falls in past 12 months -   Fall Risk Score -     Abuse Screening:     Abuse Screening Questionnaire 2018   Do you ever feel afraid of your partner? N   Are you in a relationship with someone who physically or mentally threatens you? N   Is it safe for you to go home? Y     Coordination of Care Questionaire:   1. Have you been to the ER, urgent care clinic since your last visit? Hospitalized since your last visit? no    2. Have you seen or consulted any other health care providers outside of the [address] [place] [address] since your last visit? Include any pap smears or colon screening. no    Advanced Directive:   1. Do you have an Advanced Directive? no    2. Would you like information on Advanced Directives?  no

## 2018-05-01 NOTE — PROGRESS NOTES
Subjective:      Patient : This patient is a 80 y.o. female that comes in with a chief complaint of allerigic rhinitis. She states a lengthy history of asthma and allergies. Worsened this year. No other concerns today. Objective:     ROS:   Feeling well. No dyspnea or chest pain on exertion. No abdominal pain, change in bowel habits, black or bloody stools. No urinary tract symptoms. GYN ROS: normal menses, no abnormal bleeding, pelvic pain or discharge, no breast pain or new or enlarging lumps on self exam. No neurological complaints. OBJECTIVE:   The patient appears well, alert, oriented x 3, in no distress. Visit Vitals    /65 (BP 1 Location: Left arm, BP Patient Position: Sitting)    Pulse 62    Temp 97.9 °F (36.6 °C) (Oral)    Resp 17    Ht 5' 1\" (1.549 m)    Wt 137 lb (62.1 kg)    SpO2 97%    BMI 25.89 kg/m2     HEENT:ENT with + erythema in throat, + PND. Neck supple. No adenopathy or thyromegaly. DEBORAH. Chest: Lungs are clear, good air entry, no wheezes, rhonchi or rales. Cardiovascular: S1 and S2 normal, no murmurs, regular rate and rhythm. Abdomen: soft without tenderness, guarding, mass or organomegaly. Extremities: show no edema, normal peripheral pulses. Neurological: is normal, no focal findings. Assessment/Plan:   Refill on albuterol provided add daily allergy medication to regiment for now given history. Asthma stable, If any concern with asthma rtc   Encounter Diagnoses   Name Primary?  Seasonal allergic rhinitis due to pollen Yes    Moderate persistent asthma without complication      Orders Placed This Encounter    albuterol (PROVENTIL HFA, VENTOLIN HFA, PROAIR HFA) 90 mcg/actuation inhaler    cetirizine (ZYRTEC) 10 mg tablet    fluticasone (FLONASE) 50 mcg/actuation nasal spray   .

## 2018-05-02 RX ORDER — GABAPENTIN 100 MG/1
100 CAPSULE ORAL 3 TIMES DAILY
Qty: 270 CAP | Refills: 0 | Status: SHIPPED | OUTPATIENT
Start: 2018-05-02 | End: 2018-06-04 | Stop reason: SDUPTHER

## 2018-05-02 RX ORDER — POTASSIUM CHLORIDE 750 MG/1
10 TABLET, EXTENDED RELEASE ORAL DAILY
Qty: 90 TAB | Refills: 0 | Status: SHIPPED | OUTPATIENT
Start: 2018-05-02 | End: 2018-08-15 | Stop reason: SDUPTHER

## 2018-05-02 RX ORDER — DOCUSATE SODIUM 100 MG/1
100 CAPSULE, LIQUID FILLED ORAL 2 TIMES DAILY
Qty: 180 CAP | Refills: 0 | Status: SHIPPED | OUTPATIENT
Start: 2018-05-02 | End: 2018-07-10 | Stop reason: SDUPTHER

## 2018-05-14 NOTE — TELEPHONE ENCOUNTER
Pt is scheduled for appt.      Future Appointments  Date Time Provider Davion Waters   5/15/2018 11:15 AM Dmitry Jasso MD [facility]

## 2018-05-15 ENCOUNTER — OFFICE VISIT (OUTPATIENT)
Dept: FAMILY MEDICINE CLINIC | Age: 68
End: 2018-05-15

## 2018-05-15 VITALS
SYSTOLIC BLOOD PRESSURE: 126 MMHG | TEMPERATURE: 98 F | BODY MASS INDEX: 26.06 KG/M2 | RESPIRATION RATE: 12 BRPM | OXYGEN SATURATION: 97 % | DIASTOLIC BLOOD PRESSURE: 52 MMHG | HEART RATE: 67 BPM | WEIGHT: 138 LBS | HEIGHT: 61 IN

## 2018-05-15 DIAGNOSIS — I10 ESSENTIAL HYPERTENSION: ICD-10-CM

## 2018-05-15 DIAGNOSIS — E78.00 PURE HYPERCHOLESTEROLEMIA: ICD-10-CM

## 2018-05-15 DIAGNOSIS — E11.42 TYPE 2 DIABETES MELLITUS WITH DIABETIC POLYNEUROPATHY, WITHOUT LONG-TERM CURRENT USE OF INSULIN (HCC): Primary | ICD-10-CM

## 2018-05-15 LAB — HBA1C MFR BLD HPLC: 8.5 %

## 2018-05-15 RX ORDER — ALENDRONATE SODIUM 70 MG/1
70 TABLET ORAL
Qty: 12 TAB | Refills: 2 | Status: SHIPPED | OUTPATIENT
Start: 2018-05-15 | End: 2019-09-12 | Stop reason: SDUPTHER

## 2018-05-15 NOTE — PROGRESS NOTES
Pt given A1C of 8.5. Pt will resume diet of lean meats and vegetables. Will try to limit carbs and sweet. States she indulges in lot of mangos.

## 2018-05-15 NOTE — PROGRESS NOTES
Tez Selby is a 80 y.o. female (: 1950) presenting to address:    Chief Complaint   Patient presents with    Diabetes     3 month follow up       Vitals:    05/15/18 1133   BP: 126/52   Pulse: 67   Resp: 12   Temp: 98 °F (36.7 °C)   TempSrc: Oral   SpO2: 97%   Weight: 138 lb (62.6 kg)   Height: 5' 1\" (1.549 m)   PainSc:   5   PainLoc: Head       Hearing/Vision:   No exam data present    Learning Assessment:     Learning Assessment 2016   PRIMARY LEARNER Patient   HIGHEST LEVEL OF EDUCATION - PRIMARY LEARNER  SOME COLLEGE   PRIMARY LANGUAGE ENGLISH   LEARNER PREFERENCE PRIMARY DEMONSTRATION   ANSWERED BY patient   RELATIONSHIP SELF     Depression Screening:     PHQ over the last two weeks 5/15/2018   PHQ Not Done -   Little interest or pleasure in doing things Not at all   Feeling down, depressed or hopeless Not at all   Total Score PHQ 2 0     Fall Risk Assessment:     Fall Risk Assessment, last 12 mths 5/15/2018   Able to walk? Yes   Fall in past 12 months? No   Fall with injury? -   Number of falls in past 12 months -   Fall Risk Score -     Abuse Screening:     Abuse Screening Questionnaire 5/15/2018   Do you ever feel afraid of your partner? N   Are you in a relationship with someone who physically or mentally threatens you? N   Is it safe for you to go home? Y     Coordination of Care Questionaire:   1. Have you been to the ER, urgent care clinic since your last visit? Hospitalized since your last visit? NO    2. Have you seen or consulted any other health care providers outside of the [address] [place] [address] since your last visit? Include any pap smears or colon screening. NO    Advanced Directive:   1. Do you have an Advanced Directive? NO    2. Would you like information on Advanced Directives?  NO

## 2018-05-15 NOTE — PROGRESS NOTES
Assessment/Plan:    *Diagnoses and all orders for this visit:    1. Type 2 diabetes mellitus with diabetic polyneuropathy, without long-term current use of insulin (HCC)  -     AMB POC HEMOGLOBIN A1C    2. Essential hypertension    3. Pure hypercholesterolemia    Other orders  -     alendronate (FOSAMAX) 70 mg tablet; Take 1 Tab by mouth every seven (7) days. F/u 4 months. Just A1c. Will not adjust her DM meds just yet. Will allow her to work on diet and exercise. The plan was discussed with the patient. The patient verbalized understanding and is in agreement with the plan. All medication potential side effects were discussed with the patient.    -------------------------------------------------------------------------------------------------------------------        Brandy Smith is a 80 y.o. female and presents with Diabetes (3 month follow up)         Subjective:  Pt here for f/u. DM:  A1c has risen to 8.5%. Was much better on previous lab. Has not been following her diet. HTN: well controlled. HLD: has been at goal on last BW. Osteoporosis: pt never picked up the Fosamax I ordered last Nov.  I went through her DEXA results with her again. ROS:  Constitutional: No recent weight change. No weakness/fatigue. No f/c. Skin: No rashes, change in nails/hair, itching   HENT: No HA, dizziness. No hearing loss/tinnitus. No nasal congestion/discharge. Eyes: No change in vision, double/blurred vision or eye pain/redness. Cardiovascular: No CP/palpitations. No CRABTREE/orthopnea/PND. Respiratory: No cough/sputum, dyspnea, wheezing. Gastointestinal: No dysphagia, reflux. No n/v. No constipation/diarrhea. No melena/rectal bleeding. Genitourinary: No dysuria, urinary hesitancy, nocturia, hematuria. No incontinence. Musculoskeletal: No joint pain/stiffness. No muscle pain/tenderness. Endo: No heat/cold intolerance, no polyuria/polydypsia. Heme: No h/o anemia.   No easy bleeding/bruising. Allergy/Immunology: No seasonal rhinitis. Denies frequent colds, sinus/ear infections. Neurological: No seizures/numbness/weakness. No paresthesias. Psychiatric:  No depression, anxiety. The problem list was updated as a part of today's visit. Patient Active Problem List   Diagnosis Code    Hypertension I10    Diabetes ([address] E11.9    GERD (gastroesophageal reflux disease) K21.9    Depression F32.9    HLD (hyperlipidemia) E78.5    Acquired hypothyroidism E03.9    Dizziness R42    Asthma J45.909    Sleep disturbance G47.9       The PSH, FH were reviewed. SH:  Social History   Substance Use Topics    Smoking status: Never Smoker    Smokeless tobacco: Never Used    Alcohol use Yes       Medications/Allergies:  Current Outpatient Prescriptions on File Prior to Visit   Medication Sig Dispense Refill    SITagliptin (JANUVIA) 100 mg tablet Take 1 Tab by mouth daily. 90 Tab 0    gabapentin (NEURONTIN) 100 mg capsule Take 1 Cap by mouth three (3) times daily. 270 Cap 0    docusate sodium (COLACE) 100 mg capsule Take 1 Cap by mouth two (2) times a day. 180 Cap 0    potassium chloride (KLOR-CON) 10 mEq tablet Take 1 Tab by mouth daily. 90 Tab 0    albuterol (PROVENTIL HFA, VENTOLIN HFA, PROAIR HFA) 90 mcg/actuation inhaler Take 2 Puffs by inhalation every four (4) hours as needed for Wheezing. 1 Inhaler 2    cetirizine (ZYRTEC) 10 mg tablet Take 1 Tab by mouth daily. 90 Tab 1    fluticasone (FLONASE) 50 mcg/actuation nasal spray 2 Sprays by Both Nostrils route daily. 1 Bottle 2    levothyroxine (SYNTHROID) 50 mcg tablet Take 1 Tab by mouth Daily (before breakfast). 90 Tab 2    propranolol LA (INDERAL LA) 60 mg SR capsule Take 1 Cap by mouth daily. Indications: MIGRAINE PREVENTION 90 Cap 2    fluticasone-salmeterol (ADVAIR) 250-50 mcg/dose diskus inhaler Take 1 Puff by inhalation every twelve (12) hours.  3 Inhaler 2    PARoxetine (PAXIL) 40 mg tablet Take 1 Tab by mouth daily. 90 Tab 2    lisinopril (PRINIVIL, ZESTRIL) 40 mg tablet Take 1 Tab by mouth daily. 90 Tab 2    atorvastatin (LIPITOR) 10 mg tablet Take 1 Tab by mouth daily. 90 Tab 2    metFORMIN (GLUCOPHAGE) 1,000 mg tablet Take 1 Tab by mouth two (2) times daily (with meals). 180 Tab 2    chlorthalidone (HYGROTEN) 25 mg tablet Take 1 Tab by mouth daily. 90 Tab 1    fenofibrate (LOFIBRA) 54 mg tablet Take 1 Tab by mouth daily. 90 Tab 2    polyethylene glycol (MIRALAX) 17 gram packet Take 17 g by mouth daily.  garlic 2,730 mg cap Take  by mouth.  ginger, Zingiber officinalis, (GINGER EXTRACT) 250 mg cap Take  by mouth.  ondansetron hcl (ZOFRAN, AS HYDROCHLORIDE,) 4 mg tablet Take 1 Tab by mouth every eight (8) hours as needed for Nausea. 60 Tab 1    meclizine (ANTIVERT) 25 mg tablet Take 1 Tab by mouth three (3) times daily as needed. 60 Tab 1    Omega-3-DHA-EPA-Fish Oil 1,000 mg (120 mg-180 mg) cap Take  by mouth daily. No current facility-administered medications on file prior to visit.          Allergies   Allergen Reactions    Codeine Nausea and Vomiting         Health Maintenance:   Health Maintenance   Topic Date Due    Pneumococcal 65+ Low/Medium Risk (1 of 2 - PCV13) 04/05/2016    EYE EXAM RETINAL OR DILATED Q1  12/07/2017    HEMOGLOBIN A1C Q6M  04/24/2018    Influenza Age 5 to Adult  08/01/2018    FOOT EXAM Q1  10/23/2018    MICROALBUMIN Q1  10/24/2018    LIPID PANEL Q1  10/24/2018    GLAUCOMA SCREENING Q2Y  12/07/2018    MEDICARE YEARLY EXAM  01/31/2019    BREAST CANCER SCRN MAMMOGRAM  11/16/2019    DTaP/Tdap/Td series (2 - Td) 08/11/2020    COLONOSCOPY  12/21/2021    Hepatitis C Screening  Completed    Bone Densitometry (Dexa) Screening  Completed    ZOSTER VACCINE AGE 60>  Completed       Objective:  Visit Vitals    /52 (BP 1 Location: Left arm, BP Patient Position: Sitting)    Pulse 67    Temp 98 °F (36.7 °C) (Oral)    Resp 12    Ht 5' 1\" (1.549 m)    Wt 138 lb (62.6 kg)    SpO2 97%    BMI 26.07 kg/m2          Nurses notes and VS reviewed. Physical Examination: General appearance - alert, well appearing, and in no distress  Chest - clear to auscultation, no wheezes, rales or rhonchi, symmetric air entry  Heart - normal rate, regular rhythm, normal S1, S2, no murmurs, rubs, clicks or gallops        Labwork and Ancillary Studies:    CBC w/Diff  Lab Results   Component Value Date/Time    WBC 8.5 10/24/2017 10:39 AM    HGB 11.2 (L) 10/24/2017 10:39 AM    PLATELET 048 26/56/0201 10:39 AM         Basic Metabolic Profile  Lab Results   Component Value Date/Time    Sodium 136 10/24/2017 10:39 AM    Potassium 3.4 (L) 10/24/2017 10:39 AM    Chloride 97 (L) 10/24/2017 10:39 AM    CO2 27 10/24/2017 10:39 AM    Anion gap 12 10/24/2017 10:39 AM    Glucose 216 (H) 10/24/2017 10:39 AM    BUN 10 10/24/2017 10:39 AM    Creatinine 0.66 10/24/2017 10:39 AM    BUN/Creatinine ratio 15 10/24/2017 10:39 AM    GFR est AA >60 10/24/2017 10:39 AM    GFR est non-AA >60 10/24/2017 10:39 AM    Calcium 9.2 10/24/2017 10:39 AM         LFT  Lab Results   Component Value Date/Time    ALT (SGPT) 19 10/24/2017 10:39 AM    AST (SGOT) 11 (L) 10/24/2017 10:39 AM    Alk.  phosphatase 45 10/24/2017 10:39 AM    Bilirubin, direct 0.1 10/24/2017 10:39 AM    Bilirubin, total 0.3 10/24/2017 10:39 AM         Cholesterol  Lab Results   Component Value Date/Time    Cholesterol, total 132 10/24/2017 10:39 AM    HDL Cholesterol 45 10/24/2017 10:39 AM    LDL, calculated 64.2 10/24/2017 10:39 AM    Triglyceride 114 10/24/2017 10:39 AM    CHOL/HDL Ratio 2.9 10/24/2017 10:39 AM

## 2018-05-15 NOTE — MR AVS SNAPSHOT
[address] [address] [place]  [address] [facility] [address] 
[phone] Patient: Les Levels MRN: [id number] RWN:[id number] Visit Information Date & Time Provider Department Dept. Phone Encounter #  
 5/15/2018 11:15 AM Steven Wood [phone] [id number] Upcoming Health Maintenance Date Due Pneumococcal 65+ Low/Medium Risk (1 of 2 - PCV13) 4/5/2016 EYE EXAM RETINAL OR DILATED Q1 12/7/2017 HEMOGLOBIN A1C Q6M 4/24/2018 Influenza Age 5 to Adult 8/1/2018 FOOT EXAM Q1 10/23/2018 MICROALBUMIN Q1 10/24/2018 LIPID PANEL Q1 10/24/2018 GLAUCOMA SCREENING Q2Y 12/7/2018 MEDICARE YEARLY EXAM 1/31/2019 BREAST CANCER SCRN MAMMOGRAM 11/16/2019 DTaP/Tdap/Td series (2 - Td) 8/11/2020 COLONOSCOPY 12/21/2021 Allergies as of 5/15/2018  Review Complete On: 5/15/2018 By: Andreas Cardenas Severity Noted Reaction Type Reactions Codeine  04/01/2016    Nausea and Vomiting Current Immunizations  Reviewed on 1/18/2017 Name Date Influenza High Dose Vaccine PF 10/23/2017  1:51 PM, 8/25/2016 11:56 AM  
 Influenza Vaccine 10/23/2015 Pneumococcal Polysaccharide (PPSV-23) 11/14/2014 Tdap 8/11/2010 Zoster Vaccine, Live 7/20/2011 Not reviewed this visit You Were Diagnosed With   
  
 Codes Comments Type 2 diabetes mellitus with diabetic polyneuropathy, without long-term current use of insulin (HCC)    -  Primary ICD-10-CM: E11.42 
ICD-9-CM: 250.60, 357.2 Essential hypertension     ICD-10-CM: I10 
ICD-9-CM: 401.9 Pure hypercholesterolemia     ICD-10-CM: E78.00 ICD-9-CM: 272.0 Vitals BP Pulse Temp Resp Height(growth percentile) Weight(growth percentile) 126/52 (BP 1 Location: Left arm, BP Patient Position: Sitting) 67 98 °F (36.7 °C) (Oral) 12 5' 1\" (1.549 m) 138 lb (62.6 kg) SpO2 BMI OB Status Smoking Status 97% 26.07 kg/m2 Postmenopausal Never Smoker BMI and BSA Data Body Mass Index Body Surface Area 26.07 kg/m 2 1.64 m 2 Preferred Pharmacy Pharmacy Name Phone [address] [phone] Your Updated Medication List  
  
   
This list is accurate as of 5/15/18 12:03 PM.  Always use your most recent med list.  
  
  
  
  
 albuterol 90 mcg/actuation inhaler Commonly known as:  PROVENTIL HFA, VENTOLIN HFA, PROAIR HFA Take 2 Puffs by inhalation every four (4) hours as needed for Wheezing. alendronate 70 mg tablet Commonly known as:  FOSAMAX Take 1 Tab by mouth every seven (7) days. atorvastatin 10 mg tablet Commonly known as:  LIPITOR Take 1 Tab by mouth daily. cetirizine 10 mg tablet Commonly known as:  ZYRTEC Take 1 Tab by mouth daily. chlorthalidone 25 mg tablet Commonly known as:  Hodan Sous Take 1 Tab by mouth daily. docusate sodium 100 mg capsule Commonly known as:  Pee Mars Take 1 Cap by mouth two (2) times a day. fenofibrate 54 mg tablet Commonly known as:  LOFIBRA Take 1 Tab by mouth daily. fluticasone 50 mcg/actuation nasal spray Commonly known as:  Migel Daniella 2 Sprays by Both Nostrils route daily. fluticasone-salmeterol 250-50 mcg/dose diskus inhaler Commonly known as:  ADVAIR Take 1 Puff by inhalation every twelve (12) hours. gabapentin 100 mg capsule Commonly known as:  NEURONTIN Take 1 Cap by mouth three (3) times daily. garlic 9,264 mg Cap Take  by mouth. GINGER EXTRACT 250 mg Cap Generic drug:  ginger (Zingiber officinalis) Take  by mouth.  
  
 levothyroxine 50 mcg tablet Commonly known as:  SYNTHROID Take 1 Tab by mouth Daily (before breakfast). lisinopril 40 mg tablet Commonly known as:  Murillo [place] Take 1 Tab by mouth daily. meclizine 25 mg tablet Commonly known as:  ANTIVERT Take 1 Tab by mouth three (3) times daily as needed. metFORMIN 1,000 mg tablet Commonly known as:  GLUCOPHAGE Take 1 Tab by mouth two (2) times daily (with meals). MIRALAX 17 gram packet Generic drug:  polyethylene glycol Take 17 g by mouth daily. omega 3-DHA-EPA-fish oil 1,000 mg (120 mg-180 mg) capsule Take  by mouth daily. ondansetron hcl 4 mg tablet Commonly known as:  David Hatter Take 1 Tab by mouth every eight (8) hours as needed for Nausea. PARoxetine 40 mg tablet Commonly known as:  PAXIL Take 1 Tab by mouth daily. potassium chloride 10 mEq tablet Commonly known as:  KLOR-CON Take 1 Tab by mouth daily. propranolol LA 60 mg SR capsule Commonly known as:  INDERAL LA Take 1 Cap by mouth daily. Indications: MIGRAINE PREVENTION SITagliptin 100 mg tablet Commonly known as:  Allison Melena Take 1 Tab by mouth daily. Prescriptions Sent to Pharmacy Refills  
 alendronate (FOSAMAX) 70 mg tablet 2 Sig: Take 1 Tab by mouth every seven (7) days. Class: Normal  
 Pharmacy: [address] [facility] [address] [facility] #: [phone] Route: Oral  
  
We Performed the Following AMB POC HEMOGLOBIN A1C [98019 CPT(R)] Patient Instructions Learning About Diabetes Food Guidelines Your Care Instructions Meal planning is important to manage diabetes. It helps keep your blood sugar at a target level (which you set with your doctor). You don't have to eat special foods. You can eat what your family eats, including sweets once in a while. But you do have to pay attention to how often you eat and how much you eat of certain foods. You may want to work with a dietitian or a certified diabetes educator (CDE) to help you plan meals and snacks. A dietitian or CDE can also help you lose weight if that is one of your goals. What should you know about eating carbs? Managing the amount of carbohydrate (carbs) you eat is an important part of healthy meals when you have diabetes. Carbohydrate is found in many foods. · Learn which foods have carbs. And learn the amounts of carbs in different foods. ¨ Bread, cereal, pasta, and rice have about 15 grams of carbs in a serving. A serving is 1 slice of bread (1 ounce), ½ cup of cooked cereal, or 1/3 cup of cooked pasta or rice. ¨ Fruits have 15 grams of carbs in a serving. A serving is 1 small fresh fruit, such as an apple or orange; ½ of a banana; ½ cup of cooked or canned fruit; ½ cup of fruit juice; 1 cup of melon or raspberries; or 2 tablespoons of dried fruit. ¨ Milk and no-sugar-added yogurt have 15 grams of carbs in a serving. A serving is 1 cup of milk or 2/3 cup of no-sugar-added yogurt. ¨ Starchy vegetables have 15 grams of carbs in a serving. A serving is ½ cup of mashed potatoes or sweet potato; 1 cup winter squash; ½ of a small baked potato; ½ cup of cooked beans; or ½ cup cooked corn or green peas. · Learn how much carbs to eat each day and at each meal. A dietitian or CDE can teach you how to keep track of the amount of carbs you eat. This is called carbohydrate counting. · If you are not sure how to count carbohydrate grams, use the Plate Method to plan meals. It is a good, quick way to make sure that you have a balanced meal. It also helps you spread carbs throughout the day. ¨ Divide your plate by types of foods. Put non-starchy vegetables on half the plate, meat or other protein food on one-quarter of the plate, and a grain or starchy vegetable in the final quarter of the plate.  To this you can add a small piece of fruit and 1 cup of milk or yogurt, depending on how many carbs you are supposed to eat at a meal. 
· Try to eat about the same amount of carbs at each meal. Do not \"save up\" your daily allowance of carbs to eat at one meal. 
 · Proteins have very little or no carbs per serving. Examples of proteins are beef, chicken, turkey, fish, eggs, tofu, cheese, cottage cheese, and peanut butter. A serving size of meat is 3 ounces, which is about the size of a deck of cards. Examples of meat substitute serving sizes (equal to 1 ounce of meat) are 1/4 cup of cottage cheese, 1 egg, 1 tablespoon of peanut butter, and ½ cup of tofu. How can you eat out and still eat healthy? · Learn to estimate the serving sizes of foods that have carbohydrate. If you measure food at home, it will be easier to estimate the amount in a serving of restaurant food. · If the meal you order has too much carbohydrate (such as potatoes, corn, or baked beans), ask to have a low-carbohydrate food instead. Ask for a salad or green vegetables. · If you use insulin, check your blood sugar before and after eating out to help you plan how much to eat in the future. · If you eat more carbohydrate at a meal than you had planned, take a walk or do other exercise. This will help lower your blood sugar. What else should you know? · Limit saturated fat, such as the fat from meat and dairy products. This is a healthy choice because people who have diabetes are at higher risk of heart disease. So choose lean cuts of meat and nonfat or low-fat dairy products. Use olive or canola oil instead of butter or shortening when cooking. · Don't skip meals. Your blood sugar may drop too low if you skip meals and take insulin or certain medicines for diabetes. · Check with your doctor before you drink alcohol. Alcohol can cause your blood sugar to drop too low. Alcohol can also cause a bad reaction if you take certain diabetes medicines. Follow-up care is a key part of your treatment and safety. Be sure to make and go to all appointments, and call your doctor if you are having problems. It's also a good idea to know your test results and keep a list of the medicines you take. Where can you learn more? Go to http://bertha-shayla.info/. Enter L883 in the search box to learn more about \"Learning About Diabetes Food Guidelines. \" Current as of: March 13, 2017 Content Version: 11.4 © 9703-8802 Healthwise, Incorporated. Care instructions adapted under license by [employer] (which disclaims liability or warranty for this information). If you have questions about a medical condition or this instruction, always ask your healthcare professional. [address] any warranty or liability for your use of this information. Introducing [facility]! [facility] introduces MagForce patient portal. Now you can access parts of your medical record, email your doctor's office, and request medication refills online. 1. In your internet browser, go to [email or web address] [employer] [email or web address] [employer] Stats 2. Click on the First Time User? Click Here link in the Sign In box. You will see the New Member Sign Up page. 3. Enter your MagForce Access Code exactly as it appears below. You will not need to use this code after youve completed the sign-up process. If you do not sign up before the expiration date, you must request a new code. · Mag[employer] Access Code: [email or web address] Expires: 8/13/2018 12:03 PM 
 
4. Enter the last four digits of your Social Security Number (xxxx) and Date of Birth (mm/dd/yyyy) as indicated and click Submit. You will be taken to the next sign-up page. 5. Create a MagForce ID. This will be your MagForce login ID and cannot be changed, so think of one that is secure and easy to remember. 6. Create a MagForce password. You can change your password at any time. 7. Enter your Password Reset Question and Answer. This can be used at a later time if you forget your password. 8. Enter your e-mail address. You will receive e-mail notification when new information is available in [address] 9. Click Sign Up. You can now view and download portions of your medical record. 10. Click the Download Summary menu link to download a portable copy of your medical information. If you have questions, please visit the Frequently Asked Questions section of the Familio website. Remember, [employer]ilio is NOT to be used for urgent needs. For medical emergencies, dial 911. Now available from your iPhone and Android! Please provide this summary of care documentation to your next provider. Your primary care clinician is listed as Hector Limon. If you have any questions after today's visit, please call [phone].

## 2018-05-15 NOTE — PATIENT INSTRUCTIONS

## 2018-06-04 NOTE — TELEPHONE ENCOUNTER
Pt called to request a refill because she is completely out of her  medication. Pt requesting a call when rx has been sent over to the pharmacy.

## 2018-06-05 NOTE — TELEPHONE ENCOUNTER
Patient last seen 5/15/2018 script last written 5/2/2018 fpr 270 cap called and spoke with pharmacy and script is still waiting to be filled

## 2018-06-07 NOTE — TELEPHONE ENCOUNTER
Called cell which is husbands told me to call the house number she should be home .  I called and phone just rings no answer

## 2018-06-14 RX ORDER — GABAPENTIN 100 MG/1
100 CAPSULE ORAL 3 TIMES DAILY
Qty: 270 CAP | Refills: 0 | Status: SHIPPED | OUTPATIENT
Start: 2018-06-14 | End: 2019-04-08 | Stop reason: SDUPTHER

## 2018-07-10 DIAGNOSIS — E78.00 PURE HYPERCHOLESTEROLEMIA: ICD-10-CM

## 2018-07-10 DIAGNOSIS — E03.9 ACQUIRED HYPOTHYROIDISM: ICD-10-CM

## 2018-07-11 RX ORDER — CHLORTHALIDONE 25 MG/1
25 TABLET ORAL DAILY
Qty: 90 TAB | Refills: 0 | Status: SHIPPED | OUTPATIENT
Start: 2018-07-11 | End: 2018-10-19 | Stop reason: SDUPTHER

## 2018-07-11 RX ORDER — LEVOTHYROXINE SODIUM 50 UG/1
50 TABLET ORAL
Qty: 90 TAB | Refills: 0 | Status: SHIPPED | OUTPATIENT
Start: 2018-07-11 | End: 2019-03-12 | Stop reason: SDUPTHER

## 2018-07-11 RX ORDER — ATORVASTATIN CALCIUM 10 MG/1
10 TABLET, FILM COATED ORAL DAILY
Qty: 90 TAB | Refills: 0 | Status: SHIPPED | OUTPATIENT
Start: 2018-07-11 | End: 2018-10-03 | Stop reason: SDUPTHER

## 2018-07-11 RX ORDER — DOCUSATE SODIUM 100 MG/1
100 CAPSULE, LIQUID FILLED ORAL 2 TIMES DAILY
Qty: 180 CAP | Refills: 0 | Status: SHIPPED | OUTPATIENT
Start: 2018-07-11 | End: 2018-12-31 | Stop reason: SDUPTHER

## 2018-07-11 NOTE — TELEPHONE ENCOUNTER
Last seen 5/15/2018 . Colace last written 5/21/2018 for 180 :0 , Lipitor 1/17/2018 90 with 2 .  Synthroid 1/30/2018 for 90 with 2 chlorthalidone 12/8/2017  90 with 1

## 2018-08-16 RX ORDER — POTASSIUM CHLORIDE 750 MG/1
10 TABLET, EXTENDED RELEASE ORAL DAILY
Qty: 90 TAB | Refills: 0 | Status: SHIPPED | OUTPATIENT
Start: 2018-08-16 | End: 2018-08-20 | Stop reason: SDUPTHER

## 2018-08-20 ENCOUNTER — HOSPITAL ENCOUNTER (OUTPATIENT)
Dept: LAB | Age: 68
Discharge: HOME | End: 2018-08-20
Payer: MEDICARE

## 2018-08-20 ENCOUNTER — OFFICE VISIT (OUTPATIENT)
Dept: FAMILY MEDICINE CLINIC | Age: 68
End: 2018-08-20

## 2018-08-20 VITALS
HEART RATE: 69 BPM | TEMPERATURE: 98.2 F | HEIGHT: 61 IN | SYSTOLIC BLOOD PRESSURE: 110 MMHG | BODY MASS INDEX: 25.98 KG/M2 | RESPIRATION RATE: 16 BRPM | WEIGHT: 137.6 LBS | OXYGEN SATURATION: 95 % | DIASTOLIC BLOOD PRESSURE: 64 MMHG

## 2018-08-20 DIAGNOSIS — E66.3 OVERWEIGHT (BMI 25.0-29.9): ICD-10-CM

## 2018-08-20 DIAGNOSIS — R10.30 LOWER ABDOMINAL PAIN: ICD-10-CM

## 2018-08-20 DIAGNOSIS — R10.30 LOWER ABDOMINAL PAIN: Primary | ICD-10-CM

## 2018-08-20 DIAGNOSIS — I10 ESSENTIAL HYPERTENSION: ICD-10-CM

## 2018-08-20 DIAGNOSIS — R51.9 PERSISTENT HEADACHES: ICD-10-CM

## 2018-08-20 DIAGNOSIS — K59.09 OTHER CONSTIPATION: ICD-10-CM

## 2018-08-20 LAB
ALBUMIN SERPL-MCNC: 3.8 G/DL (ref 3.4–5)
ALBUMIN/GLOB SERPL: 1.2 {RATIO} (ref 0.8–1.7)
ALP SERPL-CCNC: 64 U/L (ref 45–117)
ALT SERPL-CCNC: 19 U/L (ref 13–56)
ANION GAP SERPL CALC-SCNC: 7 MMOL/L (ref 3–18)
APPEARANCE UR: CLEAR
AST SERPL-CCNC: 14 U/L (ref 15–37)
BASOPHILS # BLD: 0.1 K/UL (ref 0–0.1)
BASOPHILS NFR BLD: 1 % (ref 0–2)
BILIRUB DIRECT SERPL-MCNC: 0.2 MG/DL (ref 0–0.2)
BILIRUB SERPL-MCNC: 0.5 MG/DL (ref 0.2–1)
BILIRUB UR QL: NEGATIVE
BUN SERPL-MCNC: 12 MG/DL (ref 7–18)
BUN/CREAT SERPL: 15 (ref 12–20)
CALCIUM SERPL-MCNC: 9.1 MG/DL (ref 8.5–10.1)
CHLORIDE SERPL-SCNC: 99 MMOL/L (ref 100–108)
CO2 SERPL-SCNC: 31 MMOL/L (ref 21–32)
COLOR UR: YELLOW
CREAT SERPL-MCNC: 0.8 MG/DL (ref 0.6–1.3)
DIFFERENTIAL METHOD BLD: ABNORMAL
EOSINOPHIL # BLD: 0.4 K/UL (ref 0–0.4)
EOSINOPHIL NFR BLD: 5 % (ref 0–5)
ERYTHROCYTE [DISTWIDTH] IN BLOOD BY AUTOMATED COUNT: 12.6 % (ref 11.6–14.5)
GLOBULIN SER CALC-MCNC: 3.1 G/DL (ref 2–4)
GLUCOSE SERPL-MCNC: 326 MG/DL (ref 74–99)
GLUCOSE UR STRIP.AUTO-MCNC: >1000 MG/DL
HCT VFR BLD AUTO: 38.1 % (ref 35–45)
HGB BLD-MCNC: 12.9 G/DL (ref 12–16)
HGB UR QL STRIP: NEGATIVE
KETONES UR QL STRIP.AUTO: NEGATIVE MG/DL
LEUKOCYTE ESTERASE UR QL STRIP.AUTO: NEGATIVE
LIPASE SERPL-CCNC: 289 U/L (ref 73–393)
LYMPHOCYTES # BLD: 2.8 K/UL (ref 0.9–3.6)
LYMPHOCYTES NFR BLD: 30 % (ref 21–52)
MCH RBC QN AUTO: 29.1 PG (ref 24–34)
MCHC RBC AUTO-ENTMCNC: 33.9 G/DL (ref 31–37)
MCV RBC AUTO: 86 FL (ref 74–97)
MONOCYTES # BLD: 0.6 K/UL (ref 0.05–1.2)
MONOCYTES NFR BLD: 6 % (ref 3–10)
NEUTS SEG # BLD: 5.6 K/UL (ref 1.8–8)
NEUTS SEG NFR BLD: 58 % (ref 40–73)
NITRITE UR QL STRIP.AUTO: NEGATIVE
PH UR STRIP: 5.5 [PH] (ref 5–8)
PLATELET # BLD AUTO: 159 K/UL (ref 135–420)
PMV BLD AUTO: 12.5 FL (ref 9.2–11.8)
POTASSIUM SERPL-SCNC: 3.6 MMOL/L (ref 3.5–5.5)
PROT SERPL-MCNC: 6.9 G/DL (ref 6.4–8.2)
PROT UR STRIP-MCNC: NEGATIVE MG/DL
RBC # BLD AUTO: 4.43 M/UL (ref 4.2–5.3)
SODIUM SERPL-SCNC: 137 MMOL/L (ref 136–145)
SP GR UR REFRACTOMETRY: 1.02 (ref 1–1.03)
UROBILINOGEN UR QL STRIP.AUTO: 0.2 EU/DL (ref 0.2–1)
WBC # BLD AUTO: 9.5 K/UL (ref 4.6–13.2)

## 2018-08-20 PROCEDURE — 80048 BASIC METABOLIC PNL TOTAL CA: CPT | Performed by: INTERNAL MEDICINE

## 2018-08-20 PROCEDURE — 87086 URINE CULTURE/COLONY COUNT: CPT | Performed by: INTERNAL MEDICINE

## 2018-08-20 PROCEDURE — 83690 ASSAY OF LIPASE: CPT | Performed by: INTERNAL MEDICINE

## 2018-08-20 PROCEDURE — 36415 COLL VENOUS BLD VENIPUNCTURE: CPT | Performed by: INTERNAL MEDICINE

## 2018-08-20 PROCEDURE — 85025 COMPLETE CBC W/AUTO DIFF WBC: CPT | Performed by: INTERNAL MEDICINE

## 2018-08-20 PROCEDURE — 81003 URINALYSIS AUTO W/O SCOPE: CPT | Performed by: INTERNAL MEDICINE

## 2018-08-20 PROCEDURE — 80076 HEPATIC FUNCTION PANEL: CPT | Performed by: INTERNAL MEDICINE

## 2018-08-20 RX ORDER — POTASSIUM CHLORIDE 750 MG/1
10 TABLET, EXTENDED RELEASE ORAL DAILY
Qty: 90 TAB | Refills: 1 | Status: SHIPPED | OUTPATIENT
Start: 2018-08-20 | End: 2019-03-12 | Stop reason: SDUPTHER

## 2018-08-20 RX ORDER — PROPRANOLOL HYDROCHLORIDE 60 MG/1
60 CAPSULE, EXTENDED RELEASE ORAL DAILY
Qty: 90 CAP | Refills: 1 | Status: SHIPPED | OUTPATIENT
Start: 2018-08-20 | End: 2018-11-30 | Stop reason: SDUPTHER

## 2018-08-20 RX ORDER — POLYETHYLENE GLYCOL 3350 17 G/17G
17 POWDER, FOR SOLUTION ORAL DAILY
Qty: 90 PACKET | Refills: 0 | Status: SHIPPED | OUTPATIENT
Start: 2018-08-20 | End: 2019-09-12 | Stop reason: SDUPTHER

## 2018-08-20 NOTE — PROGRESS NOTES
Norris Brooke is a 80 y.o. female (: 1950) presenting to address:    Chief Complaint   Patient presents with    Epigastric Pain       Vitals:    18 0744   Pulse: 69   Resp: 16   Temp: 98.2 °F (36.8 °C)   TempSrc: Oral   SpO2: 95%   Weight: 137 lb 9.6 oz (62.4 kg)   Height: 5' 1\" (1.549 m)   PainSc:   3   PainLoc: Abdomen       Hearing/Vision:   No exam data present    Learning Assessment:     Learning Assessment 2016   PRIMARY LEARNER Patient   HIGHEST LEVEL OF EDUCATION - PRIMARY LEARNER  SOME COLLEGE   PRIMARY LANGUAGE ENGLISH   LEARNER PREFERENCE PRIMARY DEMONSTRATION   ANSWERED BY patient   RELATIONSHIP SELF     Depression Screening:     PHQ over the last two weeks 5/15/2018   PHQ Not Done -   Little interest or pleasure in doing things Not at all   Feeling down, depressed, irritable, or hopeless Not at all   Total Score PHQ 2 0     Fall Risk Assessment:     Fall Risk Assessment, last 12 mths 5/15/2018   Able to walk? Yes   Fall in past 12 months? No   Fall with injury? -   Number of falls in past 12 months -   Fall Risk Score -     Abuse Screening:     Abuse Screening Questionnaire 5/15/2018   Do you ever feel afraid of your partner? N   Are you in a relationship with someone who physically or mentally threatens you? N   Is it safe for you to go home? Y     Coordination of Care Questionaire:   1. Have you been to the ER, urgent care clinic since your last visit? Hospitalized since your last visit? NO    2. Have you seen or consulted any other health care providers outside of the [address] [place] [address] since your last visit? Include any pap smears or colon screening. NO    Advanced Directive:   1. Do you have an Advanced Directive? NO    2. Would you like information on Advanced Directives?  NO

## 2018-08-20 NOTE — PATIENT INSTRUCTIONS

## 2018-08-20 NOTE — PROGRESS NOTES
Assessment/Plan:    *Diagnoses and all orders for this visit:    1. Lower abdominal pain  -     CBC WITH AUTOMATED DIFF; Future  -     HEPATIC FUNCTION PANEL; Future  -     METABOLIC PANEL, BASIC; Future  -     LIPASE; Future  -     polyethylene glycol (MIRALAX) 17 gram packet; Take 1 Packet by mouth daily. -     CT ABD PELV W WO CONT; Future  -     REFERRAL TO GASTROENTEROLOGY  -     URINALYSIS W/ RFLX MICROSCOPIC; Future  -     CULTURE, URINE; Future    2. Other constipation  -     polyethylene glycol (MIRALAX) 17 gram packet; Take 1 Packet by mouth daily.  -     REFERRAL TO GASTROENTEROLOGY    3. Essential hypertension    4. Overweight (BMI 25.0-29.9)    5. Persistent headaches  -     propranolol LA (INDERAL LA) 60 mg SR capsule; Take 1 Cap by mouth daily. Indications: MIGRAINE PREVENTION    Other orders  -     potassium chloride (KLOR-CON) 10 mEq tablet; Take 1 Tab by mouth daily. Await results. If normal and still having pain, will make appt with GI. The plan was discussed with the patient. The patient verbalized understanding and is in agreement with the plan. All medication potential side effects were discussed with the patient.    -------------------------------------------------------------------------------------------------------------------        Rosa Isela Castellanos is a 80 y.o. female and presents with Epigastric Pain         Subjective:  Pt here for an on going issue with abd pain, predominantly in the lower abdomen. Has been constipated, some nausea, no diarrhea. Denies any fevers. Has had a colonoscopy in summer 2017 with Dr. Nba Hennessy with showed 2 adenomas and she needs to repeat this in 5 years. HTN: controlled. Pt will work on her weight. ROS:  Constitutional: No recent weight change. No weakness/fatigue. No f/c. Skin: No rashes, change in nails/hair, itching   HENT: No HA, dizziness. No hearing loss/tinnitus. No nasal congestion/discharge.    Eyes: No change in vision, double/blurred vision or eye pain/redness. Cardiovascular: No CP/palpitations. No CRABTREE/orthopnea/PND. Respiratory: No cough/sputum, dyspnea, wheezing. Gastointestinal: No dysphagia, reflux. No n/v. No constipation/diarrhea. No melena/rectal bleeding. Genitourinary: No dysuria, urinary hesitancy, nocturia, hematuria. No incontinence. Musculoskeletal: No joint pain/stiffness. No muscle pain/tenderness. Endo: No heat/cold intolerance, no polyuria/polydypsia. Heme: No h/o anemia. No easy bleeding/bruising. Allergy/Immunology: No seasonal rhinitis. Denies frequent colds, sinus/ear infections. Neurological: No seizures/numbness/weakness. No paresthesias. Psychiatric:  No depression, anxiety. The problem list was updated as a part of today's visit. Patient Active Problem List   Diagnosis Code    Hypertension I10    Diabetes ([facility] [address] E11.9    GERD (gastroesophageal reflux disease) K21.9    Depression F32.9    HLD (hyperlipidemia) E78.5    Acquired hypothyroidism E03.9    Dizziness R42    Asthma J45.909    Sleep disturbance G47.9       The PSH, FH were reviewed. SH:  Social History   Substance Use Topics    Smoking status: Never Smoker    Smokeless tobacco: Never Used    Alcohol use Yes       Medications/Allergies:  Current Outpatient Prescriptions on File Prior to Visit   Medication Sig Dispense Refill    chlorthalidone (HYGROTEN) 25 mg tablet Take 1 Tab by mouth daily. 90 Tab 0    levothyroxine (SYNTHROID) 50 mcg tablet Take 1 Tab by mouth Daily (before breakfast). 90 Tab 0    atorvastatin (LIPITOR) 10 mg tablet Take 1 Tab by mouth daily. 90 Tab 0    docusate sodium (COLACE) 100 mg capsule Take 1 Cap by mouth two (2) times a day. 180 Cap 0    gabapentin (NEURONTIN) 100 mg capsule Take 1 Cap by mouth three (3) times daily. 270 Cap 0    SITagliptin (JANUVIA) 100 mg tablet Take 1 Tab by mouth daily.  90 Tab 0    albuterol (PROVENTIL HFA, VENTOLIN HFA, PROAIR HFA) 90 mcg/actuation inhaler Take 2 Puffs by inhalation every four (4) hours as needed for Wheezing. 1 Inhaler 2    cetirizine (ZYRTEC) 10 mg tablet Take 1 Tab by mouth daily. 90 Tab 1    fluticasone-salmeterol (ADVAIR) 250-50 mcg/dose diskus inhaler Take 1 Puff by inhalation every twelve (12) hours. 3 Inhaler 2    PARoxetine (PAXIL) 40 mg tablet Take 1 Tab by mouth daily. 90 Tab 2    lisinopril (PRINIVIL, ZESTRIL) 40 mg tablet Take 1 Tab by mouth daily. 90 Tab 2    metFORMIN (GLUCOPHAGE) 1,000 mg tablet Take 1 Tab by mouth two (2) times daily (with meals). 842 Tab 2    garlic 5,329 mg cap Take  by mouth.  ginger, Zingiber officinalis, (GINGER EXTRACT) 250 mg cap Take  by mouth.  ondansetron hcl (ZOFRAN, AS HYDROCHLORIDE,) 4 mg tablet Take 1 Tab by mouth every eight (8) hours as needed for Nausea. 60 Tab 1    Omega-3-DHA-EPA-Fish Oil 1,000 mg (120 mg-180 mg) cap Take  by mouth daily.  alendronate (FOSAMAX) 70 mg tablet Take 1 Tab by mouth every seven (7) days. 12 Tab 2    fluticasone (FLONASE) 50 mcg/actuation nasal spray 2 Sprays by Both Nostrils route daily. 1 Bottle 2    fenofibrate (LOFIBRA) 54 mg tablet Take 1 Tab by mouth daily. 90 Tab 2    meclizine (ANTIVERT) 25 mg tablet Take 1 Tab by mouth three (3) times daily as needed. 60 Tab 1     No current facility-administered medications on file prior to visit.          Allergies   Allergen Reactions    Codeine Nausea and Vomiting         Health Maintenance:   Health Maintenance   Topic Date Due    Pneumococcal 65+ Low/Medium Risk (1 of 2 - PCV13) 04/05/2016    Influenza Age 5 to Adult  08/01/2018    FOOT EXAM Q1  10/23/2018    MICROALBUMIN Q1  10/24/2018    LIPID PANEL Q1  10/24/2018    HEMOGLOBIN A1C Q6M  11/15/2018    MEDICARE YEARLY EXAM  01/31/2019    EYE EXAM RETINAL OR DILATED Q1  04/17/2019    BREAST CANCER SCRN MAMMOGRAM  11/16/2019    GLAUCOMA SCREENING Q2Y  04/17/2020    DTaP/Tdap/Td series (2 - Td) 08/11/2020    COLONOSCOPY  12/21/2021    Hepatitis C Screening  Completed    Bone Densitometry (Dexa) Screening  Completed    ZOSTER VACCINE AGE 60>  Completed       Objective:  Visit Vitals    /64 (BP 1 Location: Left arm, BP Patient Position: Sitting)    Pulse 69    Temp 98.2 °F (36.8 °C) (Oral)    Resp 16    Ht 5' 1\" (1.549 m)    Wt 137 lb 9.6 oz (62.4 kg)    SpO2 95%    BMI 26 kg/m2          Nurses notes and VS reviewed. Physical Examination: General appearance - alert, well appearing, and in no distress  Chest - clear to auscultation, no wheezes, rales or rhonchi, symmetric air entry  Heart - normal rate and regular rhythm, systolic murmur   Abdomen - tenderness noted diffusely  no rebound tenderness noted  bowel sounds normal        Labwork and Ancillary Studies:    CBC w/Diff  Lab Results   Component Value Date/Time    WBC 8.5 10/24/2017 10:39 AM    HGB 11.2 (L) 10/24/2017 10:39 AM    PLATELET 439 65/49/1922 10:39 AM         Basic Metabolic Profile  Lab Results   Component Value Date/Time    Sodium 136 10/24/2017 10:39 AM    Potassium 3.4 (L) 10/24/2017 10:39 AM    Chloride 97 (L) 10/24/2017 10:39 AM    CO2 27 10/24/2017 10:39 AM    Anion gap 12 10/24/2017 10:39 AM    Glucose 216 (H) 10/24/2017 10:39 AM    BUN 10 10/24/2017 10:39 AM    Creatinine 0.66 10/24/2017 10:39 AM    BUN/Creatinine ratio 15 10/24/2017 10:39 AM    GFR est AA >60 10/24/2017 10:39 AM    GFR est non-AA >60 10/24/2017 10:39 AM    Calcium 9.2 10/24/2017 10:39 AM         LFT  Lab Results   Component Value Date/Time    ALT (SGPT) 19 10/24/2017 10:39 AM    AST (SGOT) 11 (L) 10/24/2017 10:39 AM    Alk.  phosphatase 45 10/24/2017 10:39 AM    Bilirubin, direct 0.1 10/24/2017 10:39 AM    Bilirubin, total 0.3 10/24/2017 10:39 AM         Cholesterol  Lab Results   Component Value Date/Time    Cholesterol, total 132 10/24/2017 10:39 AM    HDL Cholesterol 45 10/24/2017 10:39 AM    LDL, calculated 64.2 10/24/2017 10:39 AM    Triglyceride 114 10/24/2017 10:39 AM    CHOL/HDL Ratio 2.9 10/24/2017 10:39 AM

## 2018-08-20 NOTE — MR AVS SNAPSHOT
Franki Shed 
 
 
 [address] [facility] [address] 
[phone] Patient: Isabel Hutchins MRN: [id number] Visit Information Date & Time Provider Department Dept. Phone Encounter #  
 8/20/2018  7:45 AM Ever Rashid, 3 [facility] [phone] [id number] Your Appointments 9/11/2018 10:15 AM  
Office Visit with Ever Rashid MD  
3 [facility]: follow up [address] [facility] [id number] [phone]  
  
   
 [address] Upcoming Health Maintenance Date Due Pneumococcal 65+ Low/Medium Risk (1 of 2 - PCV13) 4/5/2016 Influenza Age 5 to Adult 8/1/2018 FOOT EXAM Q1 10/23/2018 MICROALBUMIN Q1 10/24/2018 LIPID PANEL Q1 10/24/2018 HEMOGLOBIN A1C Q6M 11/15/2018 MEDICARE YEARLY EXAM 1/31/2019 EYE EXAM RETINAL OR DILATED Q1 4/17/2019 BREAST CANCER SCRN MAMMOGRAM 11/16/2019 GLAUCOMA SCREENING Q2Y 4/17/2020 DTaP/Tdap/Td series (2 - Td) 8/11/2020 COLONOSCOPY 12/21/2021 Allergies as of 8/20/2018  Review Complete On: 8/20/2018 By: Ever Rashid MD  
  
 Severity Noted Reaction Type Reactions Codeine  04/01/2016    Nausea and Vomiting Current Immunizations  Reviewed on 1/18/2017 Name Date Influenza High Dose Vaccine PF 10/23/2017  1:51 PM, 8/25/2016 11:56 AM  
 Influenza Vaccine 10/23/2015 Pneumococcal Polysaccharide (PPSV-23) 11/14/2014 Tdap 8/11/2010 Zoster Vaccine, Live 7/20/2011 Not reviewed this visit You Were Diagnosed With   
  
 Codes Comments Essential hypertension    -  Primary ICD-10-CM: I10 
ICD-9-CM: 401.9 Persistent headaches     ICD-10-CM: R51 ICD-9-CM: 784.0 Lower abdominal pain     ICD-10-CM: R10.30 ICD-9-CM: 789.09  Other constipation     ICD-10-CM: K59.09 
ICD-9-CM: 564.09   
 Vitals BP Pulse Temp Resp Height(growth percentile) Weight(growth percentile) 110/64 (BP 1 Location: Left arm, BP Patient Position: Sitting) 69 98.2 °F (36.8 °C) (Oral) 16 5' 1\" (1.549 m) 137 lb 9.6 oz (62.4 kg) SpO2 BMI OB Status Smoking Status 95% 26 kg/m2 Postmenopausal Never Smoker Vitals History BMI and BSA Data Body Mass Index Body Surface Area  
 26 kg/m 2 1.64 m 2 Preferred Pharmacy Pharmacy Name Phone [address], [place] [phone] Your Updated Medication List  
  
   
This list is accurate as of 8/20/18  8:06 AM.  Always use your most recent med list.  
  
  
  
  
 albuterol 90 mcg/actuation inhaler Commonly known as:  PROVENTIL HFA, VENTOLIN HFA, PROAIR HFA Take 2 Puffs by inhalation every four (4) hours as needed for Wheezing. alendronate 70 mg tablet Commonly known as:  FOSAMAX Take 1 Tab by mouth every seven (7) days. atorvastatin 10 mg tablet Commonly known as:  LIPITOR Take 1 Tab by mouth daily. cetirizine 10 mg tablet Commonly known as:  ZYRTEC Take 1 Tab by mouth daily. chlorthalidone 25 mg tablet Commonly known as:  Lily Mend Take 1 Tab by mouth daily. docusate sodium 100 mg capsule Commonly known as:  Henreitta [place] Take 1 Cap by mouth two (2) times a day. fenofibrate 54 mg tablet Commonly known as:  LOFIBRA Take 1 Tab by mouth daily. fluticasone 50 mcg/actuation nasal spray Commonly known as:  Lennice Boss 2 Sprays by Both Nostrils route daily. fluticasone-salmeterol 250-50 mcg/dose diskus inhaler Commonly known as:  ADVAIR Take 1 Puff by inhalation every twelve (12) hours. gabapentin 100 mg capsule Commonly known as:  NEURONTIN Take 1 Cap by mouth three (3) times daily. garlic 8,582 mg Cap Take  by mouth. GINGER EXTRACT 250 mg Cap Generic drug:  ginger (Zingiber officinalis) Take  by mouth.  
  
 levothyroxine 50 mcg tablet Commonly known as:  SYNTHROID Take 1 Tab by mouth Daily (before breakfast). lisinopril 40 mg tablet Commonly known as:  Catie Escort Take 1 Tab by mouth daily. meclizine 25 mg tablet Commonly known as:  ANTIVERT Take 1 Tab by mouth three (3) times daily as needed. metFORMIN 1,000 mg tablet Commonly known as:  GLUCOPHAGE Take 1 Tab by mouth two (2) times daily (with meals). omega 3-DHA-EPA-fish oil 1,000 mg (120 mg-180 mg) capsule Take  by mouth daily. ondansetron hcl 4 mg tablet Commonly known as:  Rafal Lose Take 1 Tab by mouth every eight (8) hours as needed for Nausea. PARoxetine 40 mg tablet Commonly known as:  PAXIL Take 1 Tab by mouth daily. polyethylene glycol 17 gram packet Commonly known as:  Alonza Schimke Take 1 Packet by mouth daily. potassium chloride 10 mEq tablet Commonly known as:  KLOR-CON Take 1 Tab by mouth daily. propranolol LA 60 mg SR capsule Commonly known as:  INDERAL LA Take 1 Cap by mouth daily. Indications: MIGRAINE PREVENTION SITagliptin 100 mg tablet Commonly known as:  Jules Faustin Take 1 Tab by mouth daily. Prescriptions Sent to Pharmacy Refills  
 propranolol LA (INDERAL LA) 60 mg SR capsule 1 Sig: Take 1 Cap by mouth daily. Indications: MIGRAINE PREVENTION Class: Normal  
 Pharmacy: [address] [place] [address] Ph #: [phone] Route: Oral  
 potassium chloride (KLOR-CON) 10 mEq tablet 1 Sig: Take 1 Tab by mouth daily. Class: Normal  
 Pharmacy: [address] [place] [address] Ph #: [phone] Route: Oral  
 polyethylene glycol (MIRALAX) 17 gram packet 0 Sig: Take 1 Packet by mouth daily.   
 Class: Normal  
 Pharmacy: [address] [address] [address] [address] [facility] #: [phone] Route: Oral  
  
We Performed the Following REFERRAL TO GASTROENTEROLOGY [QCH12 Custom] Comments:  
 Please evaluate patient for persistent abdominal pain. To-Do List   
 08/20/2018 Lab:  CBC WITH AUTOMATED DIFF   
  
 08/20/2018 Imaging:  CT ABD PELV W WO CONT   
  
 08/20/2018 Microbiology:  CULTURE, URINE   
  
 08/20/2018 Lab:  HEPATIC FUNCTION PANEL   
  
 08/20/2018 Lab:  LIPASE   
  
 08/20/2018 Lab:  METABOLIC PANEL, BASIC   
  
 08/20/2018 Lab:  URINALYSIS W/ RFLX MICROSCOPIC Referral Information Referral ID Referred By Referred To  
  
 [id number] [facility] [place] [id number] [place]  [address] [place], [address] [facility] Phone: [phone] Fax: [phone] Visits Status Start Date End Date 1 New Request 8/20/18 8/20/19 If your referral has a status of pending review or denied, additional information will be sent to support the outcome of this decision. Patient Instructions Abdominal Pain: Care Instructions Your Care Instructions Abdominal pain has many possible causes. Some aren't serious and get better on their own in a few days. Others need more testing and treatment. If your pain continues or gets worse, you need to be rechecked and may need more tests to find out what is wrong. You may need surgery to correct the problem. Don't ignore new symptoms, such as fever, nausea and vomiting, urination problems, pain that gets worse, and dizziness. These may be signs of a more serious problem. Your doctor may have recommended a follow-up visit in the next 8 to 12 hours. If you are not getting better, you may need more tests or treatment. The doctor has checked you carefully, but problems can develop later. If you notice any problems or new symptoms, get medical treatment right away. Follow-up care is a key part of your treatment and safety. Be sure to make and go to all appointments, and call your doctor if you are having problems. It's also a good idea to know your test results and keep a list of the medicines you take. How can you care for yourself at home? · Rest until you feel better. · To prevent dehydration, drink plenty of fluids, enough so that your urine is light yellow or clear like water. Choose water and other caffeine-free clear liquids until you feel better. If you have kidney, heart, or liver disease and have to limit fluids, talk with your doctor before you increase the amount of fluids you drink. · If your stomach is upset, eat mild foods, such as rice, dry toast or crackers, bananas, and applesauce. Try eating several small meals instead of two or three large ones. · Wait until 48 hours after all symptoms have gone away before you have spicy foods, alcohol, and drinks that contain caffeine. · Do not eat foods that are high in fat. · Avoid anti-inflammatory medicines such as aspirin, ibuprofen (Advil, Motrin), and naproxen (Aleve). These can cause stomach upset. Talk to your doctor if you take daily aspirin for another health problem. When should you call for help? Call 911 anytime you think you may need emergency care. For example, call if: 
  · You passed out (lost consciousness).  
  · You pass maroon or very bloody stools.  
  · You vomit blood or what looks like coffee grounds.  
  · You have new, severe belly pain.  
 Call your doctor now or seek immediate medical care if: 
  · Your pain gets worse, especially if it becomes focused in one area of your belly.  
  · You have a new or higher fever.  
  · Your stools are black and look like tar, or they have streaks of blood.  
  · You have unexpected vaginal bleeding.  
  · You have symptoms of a urinary tract infection. These may include: 
¨ Pain when you urinate.  
¨ Urinating more often than usual. 
 ¨ Blood in your urine.  
  · You are dizzy or lightheaded, or you feel like you may faint.  
 Watch closely for changes in your health, and be sure to contact your doctor if: 
  · You are not getting better after 1 day (24 hours). Where can you learn more? Go to http://bertha-shayla.info/. Enter C094 in the search box to learn more about \"Abdominal Pain: Care Instructions. \" Current as of: November 20, 2017 Content Version: 11.7 © 0094-0674 [employer]. Care instructions adapted under license by [employer] (which disclaims liability or warranty for this information). If you have questions about a medical condition or this instruction, always ask your healthcare professional. [address] any warranty or liability for your use of this information. Introducing \[facility] [place] [facility]\"" & HEALTH SERVICES! Madyson Mcarthur introduces [employer] patient portal. Now you can access parts of your medical record, email your doctor's office, and request medication refills online. 1. In your internet browser, go to [email or web address] 2. Click on the First Time User? Click Here link in the Sign In box. You will see the New Member Sign Up page. 3. Enter your [employer] Access Code exactly as it appears below. You will not need to use this code after youve completed the sign-up process. If you do not sign up before the expiration date, you must request a new code. · [employer] Access Code: [email or web address] Expires: 11/18/2018  7:49 AM 
 
4. Enter the last four digits of your Social Security Number (xxxx) and Date of Birth (mm/dd/yyyy) as indicated and click Submit. You will be taken to the next sign-up page. 5. Create a Jumo ID. This will be your [employer]o login ID and cannot be changed, so think of one that is secure and easy to remember. 6. Create a [employer] password. You can change your password at any time. 7. Enter your Password Reset Question and Answer. This can be used at a later time if you forget your password. 8. Enter your e-mail address. You will receive e-mail notification when new information is available in [address] 9. Click Sign Up. You can now view and download portions of your medical record. 10. Click the Download Summary menu link to download a portable copy of your medical information. If you have questions, please visit the Frequently Asked Questions section of the [employer] website. Remember, [employer] is NOT to be used for urgent needs. For medical emergencies, dial 911. Now available from your iPhone and Android! Please provide this summary of care documentation to your next provider. Your primary care clinician is listed as Hector Limon. If you have any questions after today's visit, please call [phone].

## 2018-08-20 NOTE — ACP (ADVANCE CARE PLANNING)
Advance Care Planning (ACP) Provider Conversation Snapshot    Date of ACP Conversation: 08/20/18  Persons included in Conversation:  patient  Length of ACP Conversation in minutes:  <16 minutes (Non-Billable)    Authorized Decision Maker (if patient is incapable of making informed decisions):    This person is:   Healthcare Agent/Medical Power of  under Advance Directive        Conversation Outcomes / Follow-Up Plan:   Recommended completion of Advance Directive form after review of ACP materials and conversation with prospective healthcare agent

## 2018-08-22 LAB
BACTERIA SPEC CULT: NORMAL
SERVICE CMNT-IMP: NORMAL

## 2018-08-30 NOTE — PROGRESS NOTES
Called home phone just rings .  Called and spoke with  he isnt home and states patient doesn't get up until 1 pm

## 2018-09-06 NOTE — PROGRESS NOTES
Patient notified of results she voiced understanding she says pain comes and goes, will discuss further at appt on 9/11

## 2018-09-11 ENCOUNTER — OFFICE VISIT (OUTPATIENT)
Dept: FAMILY MEDICINE CLINIC | Age: 68
End: 2018-09-11

## 2018-09-11 VITALS
DIASTOLIC BLOOD PRESSURE: 68 MMHG | HEIGHT: 61 IN | BODY MASS INDEX: 26.06 KG/M2 | TEMPERATURE: 98.5 F | WEIGHT: 138 LBS | HEART RATE: 69 BPM | RESPIRATION RATE: 16 BRPM | SYSTOLIC BLOOD PRESSURE: 124 MMHG | OXYGEN SATURATION: 97 %

## 2018-09-11 DIAGNOSIS — I10 ESSENTIAL HYPERTENSION: ICD-10-CM

## 2018-09-11 DIAGNOSIS — E78.00 PURE HYPERCHOLESTEROLEMIA: ICD-10-CM

## 2018-09-11 DIAGNOSIS — Z23 ENCOUNTER FOR IMMUNIZATION: ICD-10-CM

## 2018-09-11 DIAGNOSIS — E11.42 TYPE 2 DIABETES MELLITUS WITH DIABETIC POLYNEUROPATHY, WITHOUT LONG-TERM CURRENT USE OF INSULIN (HCC): Primary | ICD-10-CM

## 2018-09-11 DIAGNOSIS — H81.10 BENIGN PAROXYSMAL POSITIONAL VERTIGO, UNSPECIFIED LATERALITY: ICD-10-CM

## 2018-09-11 DIAGNOSIS — E55.9 HYPOVITAMINOSIS D: ICD-10-CM

## 2018-09-11 LAB — HBA1C MFR BLD HPLC: 10.6 %

## 2018-09-11 RX ORDER — MECLIZINE HYDROCHLORIDE 25 MG/1
25 TABLET ORAL
Qty: 60 TAB | Refills: 1 | Status: SHIPPED | OUTPATIENT
Start: 2018-09-11 | End: 2019-01-15

## 2018-09-11 RX ORDER — ALBUTEROL SULFATE 90 UG/1
2 AEROSOL, METERED RESPIRATORY (INHALATION)
Qty: 2 INHALER | Refills: 2 | Status: SHIPPED | OUTPATIENT
Start: 2018-09-11 | End: 2019-09-12 | Stop reason: SDUPTHER

## 2018-09-11 RX ORDER — GLIPIZIDE 5 MG/1
5 TABLET ORAL 2 TIMES DAILY
Qty: 180 TAB | Refills: 0 | Status: SHIPPED | OUTPATIENT
Start: 2018-09-11 | End: 2018-12-31 | Stop reason: SDUPTHER

## 2018-09-11 NOTE — PROGRESS NOTES
Assessment/Plan:    *Diagnoses and all orders for this visit:    1. Type 2 diabetes mellitus with diabetic polyneuropathy, without long-term current use of insulin (HCC)  -     AMB POC HEMOGLOBIN A1C  -     glipiZIDE (GLUCOTROL) 5 mg tablet; Take 1 Tab by mouth two (2) times a day. -     CBC WITH AUTOMATED DIFF; Future  -     HEPATIC FUNCTION PANEL; Future  -     LIPID PANEL; Future  -     METABOLIC PANEL, BASIC; Future  -     TSH 3RD GENERATION; Future  -     T4, FREE; Future  -     URINALYSIS W/ RFLX MICROSCOPIC; Future  -     HEMOGLOBIN A1C W/O EAG; Future  -     MICROALBUMIN, UR, RAND W/ MICROALB/CREAT RATIO; Future    2. Encounter for immunization  -     Influenza Vaccine Inactivated (IIV)(FLUAD), Subunit, Adjuvanted, IM, (20908)  -     Administration fee () for Medicare insured patients    3. Essential hypertension    4. Pure hypercholesterolemia    5. Benign paroxysmal positional vertigo, unspecified laterality  -     meclizine (ANTIVERT) 25 mg tablet; Take 1 Tab by mouth three (3) times daily as needed. 6. Lung nodule < 6cm on CT  -     CT CHEST W WO CONT; Future    7. Hypovitaminosis D  -     VITAMIN D, 25 HYDROXY; Future    Other orders  -     albuterol (PROVENTIL HFA, VENTOLIN HFA, PROAIR HFA) 90 mcg/actuation inhaler; Take 2 Puffs by inhalation every four (4) hours as needed for Wheezing. Physical in Jan.  Labs prior. The plan was discussed with the patient. The patient verbalized understanding and is in agreement with the plan. All medication potential side effects were discussed with the patient.    -------------------------------------------------------------------------------------------------------------------        Mary Armijo is a 80 y.o. female and presents with Dizziness (1 month )         Subjective:  Pt here for 4 mon f/u. Has been doing well. DM: NOT well controlled. Her A1c has risen to 10.6%! ! She has not been following her diet.     HTN: at goal.    HLD: stable. ROS:  Review of Systems - Negative for all systems. The problem list was updated as a part of today's visit. Patient Active Problem List   Diagnosis Code    Hypertension I10    Diabetes ([facility] [address] E11.9    GERD (gastroesophageal reflux disease) K21.9    Depression F32.9    HLD (hyperlipidemia) E78.5    Acquired hypothyroidism E03.9    Benign positional vertigo H81.10    Asthma J45.909    Sleep disturbance G47.9       The PSH, FH were reviewed. SH:  Social History   Substance Use Topics    Smoking status: Never Smoker    Smokeless tobacco: Never Used    Alcohol use Yes       Medications/Allergies:  Current Outpatient Prescriptions on File Prior to Visit   Medication Sig Dispense Refill    propranolol LA (INDERAL LA) 60 mg SR capsule Take 1 Cap by mouth daily. Indications: MIGRAINE PREVENTION 90 Cap 1    potassium chloride (KLOR-CON) 10 mEq tablet Take 1 Tab by mouth daily. 90 Tab 1    polyethylene glycol (MIRALAX) 17 gram packet Take 1 Packet by mouth daily. 90 Packet 0    chlorthalidone (HYGROTEN) 25 mg tablet Take 1 Tab by mouth daily. 90 Tab 0    levothyroxine (SYNTHROID) 50 mcg tablet Take 1 Tab by mouth Daily (before breakfast). 90 Tab 0    atorvastatin (LIPITOR) 10 mg tablet Take 1 Tab by mouth daily. 90 Tab 0    docusate sodium (COLACE) 100 mg capsule Take 1 Cap by mouth two (2) times a day. 180 Cap 0    gabapentin (NEURONTIN) 100 mg capsule Take 1 Cap by mouth three (3) times daily. 270 Cap 0    alendronate (FOSAMAX) 70 mg tablet Take 1 Tab by mouth every seven (7) days. 12 Tab 2    SITagliptin (JANUVIA) 100 mg tablet Take 1 Tab by mouth daily. 90 Tab 0    cetirizine (ZYRTEC) 10 mg tablet Take 1 Tab by mouth daily. 90 Tab 1    fluticasone (FLONASE) 50 mcg/actuation nasal spray 2 Sprays by Both Nostrils route daily. 1 Bottle 2    fluticasone-salmeterol (ADVAIR) 250-50 mcg/dose diskus inhaler Take 1 Puff by inhalation every twelve (12) hours.  3 Inhaler 2    PARoxetine (PAXIL) 40 mg tablet Take 1 Tab by mouth daily. 90 Tab 2    lisinopril (PRINIVIL, ZESTRIL) 40 mg tablet Take 1 Tab by mouth daily. 90 Tab 2    metFORMIN (GLUCOPHAGE) 1,000 mg tablet Take 1 Tab by mouth two (2) times daily (with meals). 180 Tab 2    fenofibrate (LOFIBRA) 54 mg tablet Take 1 Tab by mouth daily. 90 Tab 2    garlic 4,195 mg cap Take  by mouth.  willie, Zingiber officinalis, (WILLIE EXTRACT) 250 mg cap Take  by mouth.  Omega-3-DHA-EPA-Fish Oil 1,000 mg (120 mg-180 mg) cap Take  by mouth daily. No current facility-administered medications on file prior to visit. Allergies   Allergen Reactions    Codeine Nausea and Vomiting         Health Maintenance:   Health Maintenance   Topic Date Due    Pneumococcal 65+ Low/Medium Risk (1 of 2 - PCV13) 04/05/2016    FOOT EXAM Q1  10/23/2018    MICROALBUMIN Q1  10/24/2018    LIPID PANEL Q1  10/24/2018    MEDICARE YEARLY EXAM  01/31/2019    HEMOGLOBIN A1C Q6M  03/11/2019    EYE EXAM RETINAL OR DILATED Q1  04/17/2019    BREAST CANCER SCRN MAMMOGRAM  11/16/2019    GLAUCOMA SCREENING Q2Y  04/17/2020    DTaP/Tdap/Td series (2 - Td) 08/11/2020    COLONOSCOPY  12/21/2021    Hepatitis C Screening  Completed    Bone Densitometry (Dexa) Screening  Completed    ZOSTER VACCINE AGE 60>  Completed    Influenza Age 5 to Adult  Completed       Objective:  Visit Vitals    /68 (BP 1 Location: Left arm, BP Patient Position: Sitting)    Pulse 69    Temp 98.5 °F (36.9 °C) (Oral)    Resp 16    Ht 5' 1\" (1.549 m)    Wt 138 lb (62.6 kg)    SpO2 97%    BMI 26.07 kg/m2          Nurses notes and VS reviewed.       Physical Examination: General appearance - alert, well appearing, and in no distress  Chest - clear to auscultation, no wheezes, rales or rhonchi, symmetric air entry  Heart - normal rate, regular rhythm, normal S1, S2, no murmurs, rubs, clicks or gallops  Abdomen - soft, nontender, nondistended, no masses or organomegaly        Labwork and Ancillary Studies:    CBC w/Diff  Lab Results   Component Value Date/Time    WBC 9.5 08/20/2018 08:21 AM    HGB 12.9 08/20/2018 08:21 AM    PLATELET 361 00/71/6599 08:21 AM         Basic Metabolic Profile  Lab Results   Component Value Date/Time    Sodium 137 08/20/2018 08:21 AM    Potassium 3.6 08/20/2018 08:21 AM    Chloride 99 (L) 08/20/2018 08:21 AM    CO2 31 08/20/2018 08:21 AM    Anion gap 7 08/20/2018 08:21 AM    Glucose 326 (H) 08/20/2018 08:21 AM    BUN 12 08/20/2018 08:21 AM    Creatinine 0.80 08/20/2018 08:21 AM    BUN/Creatinine ratio 15 08/20/2018 08:21 AM    GFR est AA >60 08/20/2018 08:21 AM    GFR est non-AA >60 08/20/2018 08:21 AM    Calcium 9.1 08/20/2018 08:21 AM         LFT  Lab Results   Component Value Date/Time    ALT (SGPT) 19 08/20/2018 08:21 AM    AST (SGOT) 14 (L) 08/20/2018 08:21 AM    Alk.  phosphatase 64 08/20/2018 08:21 AM    Bilirubin, direct 0.2 08/20/2018 08:21 AM    Bilirubin, total 0.5 08/20/2018 08:21 AM         Cholesterol  Lab Results   Component Value Date/Time    Cholesterol, total 132 10/24/2017 10:39 AM    HDL Cholesterol 45 10/24/2017 10:39 AM    LDL, calculated 64.2 10/24/2017 10:39 AM    Triglyceride 114 10/24/2017 10:39 AM    CHOL/HDL Ratio 2.9 10/24/2017 10:39 AM

## 2018-09-11 NOTE — PROGRESS NOTES
Nayan Keller is a 80 y.o. female (: 1950) presenting to address:    Chief Complaint   Patient presents with    Dizziness     1 month        Vitals:    18 1029   BP: 124/68   Pulse: 69   Resp: 16   Temp: 98.5 °F (36.9 °C)   TempSrc: Oral   SpO2: 97%   Weight: 138 lb (62.6 kg)   Height: 5' 1\" (1.549 m)   PainSc:   0 - No pain       Hearing/Vision:   No exam data present    Learning Assessment:     Learning Assessment 2016   PRIMARY LEARNER Patient   HIGHEST LEVEL OF EDUCATION - PRIMARY LEARNER  SOME COLLEGE   PRIMARY LANGUAGE ENGLISH   LEARNER PREFERENCE PRIMARY DEMONSTRATION   ANSWERED BY patient   RELATIONSHIP SELF     Depression Screening:     PHQ over the last two weeks 5/15/2018   PHQ Not Done -   Little interest or pleasure in doing things Not at all   Feeling down, depressed, irritable, or hopeless Not at all   Total Score PHQ 2 0     Fall Risk Assessment:     Fall Risk Assessment, last 12 mths 5/15/2018   Able to walk? Yes   Fall in past 12 months? No   Fall with injury? -   Number of falls in past 12 months -   Fall Risk Score -     Abuse Screening:     Abuse Screening Questionnaire 5/15/2018   Do you ever feel afraid of your partner? N   Are you in a relationship with someone who physically or mentally threatens you? N   Is it safe for you to go home? Y     Coordination of Care Questionaire:   1. Have you been to the ER, urgent care clinic since your last visit? Hospitalized since your last visit? NO    2. Have you seen or consulted any other health care providers outside of the [facility] since your last visit? Include any pap smears or colon screening. Gi     Advanced Directive:   1. Do you have an Advanced Directive? NO    2. Would you like information on Advanced Directives? NO      Flu shot Immunization/s administered 2018 by Hodan Cruz LPN with guardian's consent. Patient tolerated procedure well. No reactions noted.

## 2018-09-11 NOTE — PATIENT INSTRUCTIONS

## 2018-09-11 NOTE — MR AVS SNAPSHOT
[address] [place] [address]  [address] [facility] [address] [phone] Patient: Kavya Mejiaist MRN: [id number] CYG:[id number] Visit Information Date & Time Provider Department Dept. Phone Encounter #  
 9/11/2018 10:15 AM Steven Garces [phone] [id number] Upcoming Health Maintenance Date Due Pneumococcal 65+ Low/Medium Risk (1 of 2 - PCV13) 4/5/2016 Influenza Age 5 to Adult 8/1/2018 FOOT EXAM Q1 10/23/2018 MICROALBUMIN Q1 10/24/2018 LIPID PANEL Q1 10/24/2018 HEMOGLOBIN A1C Q6M 11/15/2018 MEDICARE YEARLY EXAM 1/31/2019 EYE EXAM RETINAL OR DILATED Q1 4/17/2019 BREAST CANCER SCRN MAMMOGRAM 11/16/2019 GLAUCOMA SCREENING Q2Y 4/17/2020 DTaP/Tdap/Td series (2 - Td) 8/11/2020 COLONOSCOPY 12/21/2021 Allergies as of 9/11/2018  Review Complete On: 9/11/2018 By: Edmundo Guzmán LPN Severity Noted Reaction Type Reactions Codeine  04/01/2016    Nausea and Vomiting Current Immunizations  Reviewed on 1/18/2017 Name Date Influenza High Dose Vaccine PF 10/23/2017  1:51 PM, 8/25/2016 11:56 AM  
 Influenza Vaccine 10/23/2015 Influenza Vaccine (Tri) Adjuvanted 9/11/2018 11:01 AM  
 Pneumococcal Polysaccharide (PPSV-23) 11/14/2014 Tdap 8/11/2010 Zoster Vaccine, Live 7/20/2011 Not reviewed this visit You Were Diagnosed With   
  
 Codes Comments Type 2 diabetes mellitus with diabetic polyneuropathy, without long-term current use of insulin (HCC)    -  Primary ICD-10-CM: E11.42 
ICD-9-CM: 250.60, 357.2 Encounter for immunization     ICD-10-CM: M57 ICD-9-CM: V03.89 Essential hypertension     ICD-10-CM: I10 
ICD-9-CM: 401.9 Pure hypercholesterolemia     ICD-10-CM: E78.00 ICD-9-CM: 272.0 Benign paroxysmal positional vertigo, unspecified laterality     ICD-10-CM: H81.10 ICD-9-CM: 386.11   
 Lung nodule < 6cm on CT     ICD-10-CM: R91.1 ICD-9-CM: 793.11 Vitals BP Pulse Temp Resp Height(growth percentile) Weight(growth percentile) 124/68 (BP 1 Location: Left arm, BP Patient Position: Sitting) 69 98.5 °F (36.9 °C) (Oral) 16 5' 1\" (1.549 m) 138 lb (62.6 kg) SpO2 BMI OB Status Smoking Status 97% 26.07 kg/m2 Postmenopausal Never Smoker Vitals History BMI and BSA Data Body Mass Index Body Surface Area 26.07 kg/m 2 1.64 m 2 Preferred Pharmacy Pharmacy Name Phone [address], Sandor Purdy Mandi Postin [phone] Your Updated Medication List  
  
   
This list is accurate as of 9/11/18 11:09 AM.  Always use your most recent med list.  
  
  
  
  
 albuterol 90 mcg/actuation inhaler Commonly known as:  PROVENTIL HFA, VENTOLIN HFA, PROAIR HFA Take 2 Puffs by inhalation every four (4) hours as needed for Wheezing. alendronate 70 mg tablet Commonly known as:  FOSAMAX Take 1 Tab by mouth every seven (7) days. atorvastatin 10 mg tablet Commonly known as:  LIPITOR Take 1 Tab by mouth daily. cetirizine 10 mg tablet Commonly known as:  ZYRTEC Take 1 Tab by mouth daily. chlorthalidone 25 mg tablet Commonly known as:  Marnette Castellani Take 1 Tab by mouth daily. docusate sodium 100 mg capsule Commonly known as:  Kimberlyn Mule Take 1 Cap by mouth two (2) times a day. fenofibrate 54 mg tablet Commonly known as:  LOFIBRA Take 1 Tab by mouth daily. fluticasone 50 mcg/actuation nasal spray Commonly known as:  Montine Clare 2 Sprays by Both Nostrils route daily. fluticasone-salmeterol 250-50 mcg/dose diskus inhaler Commonly known as:  ADVAIR Take 1 Puff by inhalation every twelve (12) hours. gabapentin 100 mg capsule Commonly known as:  NEURONTIN Take 1 Cap by mouth three (3) times daily. garlic 7,170 mg Cap Take  by mouth. GINGER EXTRACT 250 mg Cap Generic drug:  ginger (Zingiber officinalis) Take  by mouth. glipiZIDE 5 mg tablet Commonly known as:  Janith Ket Take 1 Tab by mouth two (2) times a day. levothyroxine 50 mcg tablet Commonly known as:  SYNTHROID Take 1 Tab by mouth Daily (before breakfast). lisinopril 40 mg tablet Commonly known as:  Twan Shutters Take 1 Tab by mouth daily. meclizine 25 mg tablet Commonly known as:  ANTIVERT Take 1 Tab by mouth three (3) times daily as needed. metFORMIN 1,000 mg tablet Commonly known as:  GLUCOPHAGE Take 1 Tab by mouth two (2) times daily (with meals). omega 3-DHA-EPA-fish oil 1,000 mg (120 mg-180 mg) capsule Take  by mouth daily. PARoxetine 40 mg tablet Commonly known as:  PAXIL Take 1 Tab by mouth daily. polyethylene glycol 17 gram packet Commonly known as:  Leata Sans Take 1 Packet by mouth daily. potassium chloride 10 mEq tablet Commonly known as:  KLOR-CON Take 1 Tab by mouth daily. propranolol LA 60 mg SR capsule Commonly known as:  INDERAL LA Take 1 Cap by mouth daily. Indications: MIGRAINE PREVENTION SITagliptin 100 mg tablet Commonly known as:  Barbra Erichsen Take 1 Tab by mouth daily. Prescriptions Sent to Pharmacy Refills  
 meclizine (ANTIVERT) 25 mg tablet 1 Sig: Take 1 Tab by mouth three (3) times daily as needed. Class: Normal  
 Pharmacy: [address] Ph #: [phone] Route: Oral  
 albuterol (PROVENTIL HFA, VENTOLIN HFA, PROAIR HFA) 90 mcg/actuation inhaler 2 Sig: Take 2 Puffs by inhalation every four (4) hours as needed for Wheezing. Class: Normal  
 Pharmacy: [address] Ph #: [phone] Route: Inhalation  
 glipiZIDE (GLUCOTROL) 5 mg tablet 0 Sig: Take 1 Tab by mouth two (2) times a day. Class: Normal  
 [facility] [address] [facility] [address], [facility] Gurwinder Lou Risk Ph #: [phone] Route: Oral  
  
We Performed the Following ADMIN INFLUENZA VIRUS VAC [ [facility]] AMB POC HEMOGLOBIN A1C [48555 CPT(R)] INFLUENZA VACCINE INACTIVATED (IIV), SUBUNIT, ADJUVANTED, IM [id number] CPT(R)] To-Do List   
 09/11/2018 Imaging:  CT CHEST W WO CONT Patient Instructions Learning About Meal Planning for Diabetes Why plan your meals? Meal planning can be a key part of managing diabetes. Planning meals and snacks with the right balance of carbohydrate, protein, and fat can help you keep your blood sugar at the target level you set with your doctor. You don't have to eat special foods. You can eat what your family eats, including sweets once in a while. But you do have to pay attention to how often you eat and how much you eat of certain foods. You may want to work with a dietitian or a certified diabetes educator. He or she can give you tips and meal ideas and can answer your questions about meal planning. This health professional can also help you reach a healthy weight if that is one of your goals. What plan is right for you? Your dietitian or diabetes educator may suggest that you start with the plate format or carbohydrate counting. The plate format The plate format is a simple way to help you manage how you eat. You plan meals by learning how much space each food should take on a plate. Using the plate format helps you spread carbohydrate throughout the day. It can make it easier to keep your blood sugar level within your target range. It also helps you see if you're eating healthy portion sizes. To use the plate format, you put non-starchy vegetables on half your plate. Add meat or meat substitutes on one-quarter of the plate.  Put a grain or starchy vegetable (such as brown rice or a potato) on the final quarter of the plate. You can add a small piece of fruit and some low-fat or fat-free milk or yogurt, depending on your carbohydrate goal for each meal. 
Here are some tips for using the plate format: · Make sure that you are not using an oversized plate. A 9-inch plate is best. Many restaurants use larger plates. · Get used to using the plate format at home. Then you can use it when you eat out. · Write down your questions about using the plate format. Talk to your doctor, a dietitian, or a diabetes educator about your concerns. Carbohydrate counting With carbohydrate counting, you plan meals based on the amount of carbohydrate in each food. Carbohydrate raises blood sugar higher and more quickly than any other nutrient. It is found in desserts, breads and cereals, and fruit. It's also found in starchy vegetables such as potatoes and corn, grains such as rice and pasta, and milk and yogurt. Spreading carbohydrate throughout the day helps keep your blood sugar levels within your target range. Your daily amount depends on several things, including your weight, how active you are, which diabetes medicines you take, and what your goals are for your blood sugar levels. A registered dietitian or diabetes educator can help you plan how much carbohydrate to include in each meal and snack. A guideline for your daily amount of carbohydrate is: · 45 to 60 grams at each meal. That's about the same as 3 to 4 carbohydrate servings. · 15 to 20 grams at each snack. That's about the same as 1 carbohydrate serving. The Nutrition Facts label on packaged foods tells you how much carbohydrate is in a serving of the food. First, look at the serving size on the food label. Is that the amount you eat in a serving? All of the nutrition information on a food label is based on that serving size. So if you eat more or less than that, you'll need to adjust the other numbers. Total carbohydrate is the next thing you need to look for on the label. If you count carbohydrate servings, one serving of carbohydrate is 15 grams. For foods that don't come with labels, such as fresh fruits and vegetables, you'll need a guide that lists carbohydrate in these foods. Ask your doctor, dietitian, or diabetes educator about books or other nutrition guides you can use. If you take insulin, you need to know how many grams of carbohydrate are in a meal. This lets you know how much rapid-acting insulin to take before you eat. If you use an insulin pump, you get a constant rate of insulin during the day. So the pump must be programmed at meals to give you extra insulin to cover the rise in blood sugar after meals. When you know how much carbohydrate you will eat, you can take the right amount of insulin. Or, if you always use the same amount of insulin, you need to make sure that you eat the same amount of carbohydrate at meals. If you need more help to understand carbohydrate counting and food labels, ask your doctor, dietitian, or diabetes educator. How do you get started with meal planning? Here are some tips to get started: 
· Plan your meals a week at a time. Don't forget to include snacks too. · Use cookbooks or online recipes to plan several main meals. Plan some quick meals for busy nights. You also can double some recipes that freeze well. Then you can save half for other busy nights when you don't have time to cook. · Make sure you have the ingredients you need for your recipes. If you're running low on basic items, put these items on your shopping list too. · List foods that you use to make breakfasts, lunches, and snacks. List plenty of fruits and vegetables. · Post this list on the refrigerator. Add to it as you think of more things you need. · Take the list to the store to do your weekly shopping. Follow-up care is a key part of your treatment and safety.  Be sure to make and go to all appointments, and call your doctor if you are having problems. It's also a good idea to know your test results and keep a list of the medicines you take. Where can you learn more? Go to http://bertha-shayla.info/. Glroia Bernal in the search box to learn more about \"Learning About Meal Planning for Diabetes. \" Current as of: December 7, 2017 Content Version: 11.7 © 4329-0597 [employer]. Care instructions adapted under license by [employer] (which disclaims liability or warranty for this information). If you have questions about a medical condition or this instruction, always ask your healthcare professional. [address] any warranty or liability for your use of this information. Introducing [facility]! [employer] introduces SavvySync patient portal. Now you can access parts of your medical record, email your doctor's office, and request medication refills online. 1. In your internet browser, go to [email or web address] 2. Click on the First Time User? Click Here link in the Sign In box. You will see the New Member Sign Up page. 3. Enter your SavvySync Access Code exactly as it appears below. You will not need to use this code after youve completed the sign-up process. If you do not sign up before the expiration date, you must request a new code. · SavvySync Access Code: [email or web address] Expires: 11/18/2018  7:49 AM 
 
4. Enter the last four digits of your Social Security Number (xxxx) and Date of Birth (mm/dd/yyyy) as indicated and click Submit. You will be taken to the next sign-up page. 5. Create a Brandsclubt ID. This will be your SavvySync login ID and cannot be changed, so think of one that is secure and easy to remember. 6. Create a SavvySync password. You can change your password at any time. 7. Enter your Password Reset Question and Answer.  This can be used at a later time if you forget your password. 8. Enter your e-mail address. You will receive e-mail notification when new information is available in [address] 9. Click Sign Up. You can now view and download portions of your medical record. 10. Click the Download Summary menu link to download a portable copy of your medical information. If you have questions, please visit the Frequently Asked Questions section of the [employer]aryView website. Remember, [employer]aryView is NOT to be used for urgent needs. For medical emergencies, dial 911. Now available from your iPhone and Android! Please provide this summary of care documentation to your next provider. Your primary care clinician is listed as Hector 51. If you have any questions after today's visit, please call [phone].

## 2018-09-19 DIAGNOSIS — E78.00 PURE HYPERCHOLESTEROLEMIA: ICD-10-CM

## 2018-09-20 RX ORDER — FENOFIBRATE 54 MG/1
54 TABLET ORAL DAILY
Qty: 90 TAB | Refills: 1 | Status: SHIPPED | OUTPATIENT
Start: 2018-09-20 | End: 2018-09-25 | Stop reason: ALTCHOICE

## 2018-09-24 ENCOUNTER — TELEPHONE (OUTPATIENT)
Dept: FAMILY MEDICINE CLINIC | Age: 68
End: 2018-09-24

## 2018-09-24 NOTE — TELEPHONE ENCOUNTER
Pharmacy does not carry 54 mg tab they only carry 48 mg can you please call in another prescription for her fenofibrate?

## 2018-09-25 ENCOUNTER — TELEPHONE (OUTPATIENT)
Dept: FAMILY MEDICINE CLINIC | Age: 68
End: 2018-09-25

## 2018-09-25 RX ORDER — FENOFIBRATE 48 MG/1
48 TABLET, COATED ORAL DAILY
Qty: 90 TAB | Refills: 1 | Status: SHIPPED | OUTPATIENT
Start: 2018-09-25 | End: 2018-12-31 | Stop reason: SDUPTHER

## 2018-09-25 NOTE — TELEPHONE ENCOUNTER
Please notify pt that her chest CT did show a couple of 2 mm lung nodules but has confirmed the 11 mm nodule that appears stable. But I do want to repeat a CT in 6 months. If that is stable then we will repeat every 2 years for 5 years to confirm stability. For now, will repeat in 6 months.

## 2018-10-03 DIAGNOSIS — E78.00 PURE HYPERCHOLESTEROLEMIA: ICD-10-CM

## 2018-10-03 RX ORDER — ATORVASTATIN CALCIUM 10 MG/1
10 TABLET, FILM COATED ORAL DAILY
Qty: 90 TAB | Refills: 1 | Status: SHIPPED | OUTPATIENT
Start: 2018-10-03 | End: 2019-07-06 | Stop reason: SDUPTHER

## 2018-10-03 NOTE — TELEPHONE ENCOUNTER
Pt called to request a refill for her rx. She wants it sent to the [address] [facility] on [address] [facility]. I could not find it in system to put it in.  Please advise

## 2018-10-04 NOTE — TELEPHONE ENCOUNTER
Patient notified escribe is down at all the [facility] pharmacy[facility] and she will have to  hard copy and take it to them she voiced understanding

## 2018-10-13 DIAGNOSIS — R10.30 LOWER ABDOMINAL PAIN: ICD-10-CM

## 2018-10-19 RX ORDER — CHLORTHALIDONE 25 MG/1
25 TABLET ORAL DAILY
Qty: 90 TAB | Refills: 0 | Status: SHIPPED | OUTPATIENT
Start: 2018-10-19 | End: 2019-01-25 | Stop reason: SDUPTHER

## 2018-10-19 RX ORDER — CETIRIZINE HCL 10 MG
10 TABLET ORAL DAILY
Qty: 90 TAB | Refills: 1 | Status: SHIPPED | OUTPATIENT
Start: 2018-10-19 | End: 2019-07-03 | Stop reason: SDUPTHER

## 2018-10-19 NOTE — TELEPHONE ENCOUNTER
Last refilled 5/1/2018 for 90 with 1 zyrtec .  Chlorthalidone 7/11/2018 for 90 . 0 refills next oV 1/15/2019

## 2018-10-19 NOTE — TELEPHONE ENCOUNTER
Pt is in need of a prescription refill of the following medications and states she is currently out of refills, please advise.

## 2018-11-01 DIAGNOSIS — F32.89 OTHER DEPRESSION: ICD-10-CM

## 2018-11-01 RX ORDER — PAROXETINE HYDROCHLORIDE 40 MG/1
40 TABLET, FILM COATED ORAL DAILY
Qty: 90 TAB | Refills: 0 | Status: SHIPPED | OUTPATIENT
Start: 2018-11-01 | End: 2018-11-30 | Stop reason: SDUPTHER

## 2018-11-01 NOTE — TELEPHONE ENCOUNTER
Patient pharmacy is requesting a med refill of Paxil 40 mg    Last visit: 9/11/18    Last refill: 1/17/18

## 2018-11-30 DIAGNOSIS — R51.9 PERSISTENT HEADACHES: ICD-10-CM

## 2018-11-30 DIAGNOSIS — F32.89 OTHER DEPRESSION: ICD-10-CM

## 2018-11-30 NOTE — TELEPHONE ENCOUNTER
Paxil Last written 11/1/2018 for 90 tabs 0 refills  . propranolol 8/2/2018 90 with 1 . I tried to call patient script to let her know she should still have refills at the [facility] no answer and no machine.

## 2018-11-30 NOTE — TELEPHONE ENCOUNTER
Pt called to request a refill. She states that she is out of her paxil medication. Please advise and she is also requesting a call when the rx has been sent.

## 2018-12-05 RX ORDER — PROPRANOLOL HYDROCHLORIDE 60 MG/1
60 CAPSULE, EXTENDED RELEASE ORAL DAILY
Qty: 90 CAP | Refills: 1 | Status: SHIPPED | OUTPATIENT
Start: 2018-12-05 | End: 2019-04-08 | Stop reason: SDUPTHER

## 2018-12-05 RX ORDER — METFORMIN HYDROCHLORIDE 1000 MG/1
1000 TABLET ORAL 2 TIMES DAILY WITH MEALS
Qty: 180 TAB | Refills: 1 | Status: SHIPPED | OUTPATIENT
Start: 2018-12-05 | End: 2020-01-01 | Stop reason: SDUPTHER

## 2018-12-05 RX ORDER — PAROXETINE HYDROCHLORIDE 40 MG/1
40 TABLET, FILM COATED ORAL DAILY
Qty: 90 TAB | Refills: 1 | Status: SHIPPED | OUTPATIENT
Start: 2018-12-05 | End: 2018-12-31 | Stop reason: SDUPTHER

## 2018-12-05 NOTE — TELEPHONE ENCOUNTER
Patient calling to request refill on:metformin      Remaining pills: ?  Last appt:9/11/18  Next appt:1/15/19    Pharmacy:      Patient aware of 72 hour time frame.

## 2018-12-31 DIAGNOSIS — I10 ESSENTIAL HYPERTENSION: ICD-10-CM

## 2018-12-31 DIAGNOSIS — E11.42 TYPE 2 DIABETES MELLITUS WITH DIABETIC POLYNEUROPATHY, WITHOUT LONG-TERM CURRENT USE OF INSULIN (HCC): ICD-10-CM

## 2018-12-31 DIAGNOSIS — F32.89 OTHER DEPRESSION: ICD-10-CM

## 2018-12-31 RX ORDER — LISINOPRIL 40 MG/1
40 TABLET ORAL DAILY
Qty: 90 TAB | Refills: 0 | Status: SHIPPED | OUTPATIENT
Start: 2018-12-31 | End: 2019-04-08 | Stop reason: SDUPTHER

## 2018-12-31 RX ORDER — DOCUSATE SODIUM 100 MG/1
100 CAPSULE, LIQUID FILLED ORAL 2 TIMES DAILY
Qty: 180 CAP | Refills: 0 | Status: SHIPPED | OUTPATIENT
Start: 2018-12-31 | End: 2019-05-02 | Stop reason: SDUPTHER

## 2018-12-31 RX ORDER — GLIPIZIDE 5 MG/1
5 TABLET ORAL 2 TIMES DAILY
Qty: 180 TAB | Refills: 0 | Status: SHIPPED | OUTPATIENT
Start: 2018-12-31 | End: 2019-04-08 | Stop reason: SDUPTHER

## 2018-12-31 RX ORDER — FENOFIBRATE 48 MG/1
48 TABLET, COATED ORAL DAILY
Qty: 90 TAB | Refills: 0 | Status: SHIPPED | OUTPATIENT
Start: 2018-12-31 | End: 2019-03-12 | Stop reason: SDUPTHER

## 2018-12-31 RX ORDER — PAROXETINE HYDROCHLORIDE 40 MG/1
40 TABLET, FILM COATED ORAL DAILY
Qty: 90 TAB | Refills: 0 | Status: SHIPPED | OUTPATIENT
Start: 2018-12-31 | End: 2019-03-05 | Stop reason: SDUPTHER

## 2018-12-31 NOTE — TELEPHONE ENCOUNTER
Patient calling to request refill on:docusate sodium 100 mg      Remaining pills: 1  Last appt:9/11/18  Next appt: Future Appointments   Date Time Provider [facility] Evelin   1/15/2019 10:15 AM Radha Gary MD [address] [place]      Patient aware of 72 hour time frame. Patient calling to request refill on:glipizide 5 mg tab      Remaining pills:0  Last appt:9/11/18  Next appt: Future Appointments   Date Time Provider [facility] Evelin   1/15/2019 10:15 AM MD Clary Hooper 51:  Daysi Villarreal    Patient aware of 72 hour time frame. Patient calling to request refill on: Paroxetine 40 mg      Remaining pills:5  Last appt:9/11/18  Next appt: Future Appointments   Date Time Provider [facility] Evelin   1/15/2019 10:15 AM MD Clary Hooper 51:  Daysi Villarreal    Patient aware of 72 hour time frame. Patient calling to request refill on:fenofibrate      Remaining pills:0  Last appt:9/11/18  Next appt: Future Appointments   Date Time Provider [facility]in   1/15/2019 10:15 AM MD Clary Hooper 51:  Daysi Villarreal    Patient aware of 72 hour time frame. Patient calling to request refill on:lisinopril 20mg       Remaining pills:1  Last appt:9/11/18  Next appt: Future Appointments   Date Time Provider [facility]   1/15/2019 10:15 AM MD Clary Hooper 51:  Daysi Villarreal  Patient aware of 72 hour time frame.

## 2019-01-01 ENCOUNTER — TELEPHONE (OUTPATIENT)
Dept: FAMILY MEDICINE CLINIC | Age: 69
End: 2019-01-01

## 2019-01-01 DIAGNOSIS — E03.9 ACQUIRED HYPOTHYROIDISM: ICD-10-CM

## 2019-01-01 RX ORDER — LEVOTHYROXINE SODIUM 50 UG/1
50 TABLET ORAL
Qty: 90 TAB | Refills: 1 | Status: SHIPPED | OUTPATIENT
Start: 2019-01-01 | End: 2020-01-01 | Stop reason: SDUPTHER

## 2019-01-15 ENCOUNTER — OFFICE VISIT (OUTPATIENT)
Dept: FAMILY MEDICINE CLINIC | Age: 69
End: 2019-01-15

## 2019-01-15 ENCOUNTER — HOSPITAL ENCOUNTER (OUTPATIENT)
Dept: LAB | Age: 69
Discharge: HOME | End: 2019-01-15
Payer: MEDICARE

## 2019-01-15 VITALS
HEIGHT: 61 IN | OXYGEN SATURATION: 97 % | WEIGHT: 141 LBS | SYSTOLIC BLOOD PRESSURE: 128 MMHG | HEART RATE: 62 BPM | BODY MASS INDEX: 26.62 KG/M2 | TEMPERATURE: 97.7 F | DIASTOLIC BLOOD PRESSURE: 70 MMHG | RESPIRATION RATE: 18 BRPM

## 2019-01-15 DIAGNOSIS — Z12.39 BREAST CANCER SCREENING: ICD-10-CM

## 2019-01-15 DIAGNOSIS — E11.42 TYPE 2 DIABETES MELLITUS WITH DIABETIC POLYNEUROPATHY, WITHOUT LONG-TERM CURRENT USE OF INSULIN (HCC): ICD-10-CM

## 2019-01-15 DIAGNOSIS — R07.89 OTHER CHEST PAIN: ICD-10-CM

## 2019-01-15 DIAGNOSIS — I10 ESSENTIAL HYPERTENSION: Primary | ICD-10-CM

## 2019-01-15 DIAGNOSIS — Z23 ENCOUNTER FOR IMMUNIZATION: ICD-10-CM

## 2019-01-15 DIAGNOSIS — E78.00 PURE HYPERCHOLESTEROLEMIA: ICD-10-CM

## 2019-01-15 DIAGNOSIS — Z00.00 MEDICARE ANNUAL WELLNESS VISIT, SUBSEQUENT: ICD-10-CM

## 2019-01-15 LAB
ALBUMIN SERPL-MCNC: 4.5 G/DL (ref 3.4–5)
ALBUMIN/GLOB SERPL: 1.4 {RATIO} (ref 0.8–1.7)
ALP SERPL-CCNC: 48 U/L (ref 45–117)
ALT SERPL-CCNC: 20 U/L (ref 13–56)
ANION GAP SERPL CALC-SCNC: 9 MMOL/L (ref 3–18)
APPEARANCE UR: CLEAR
AST SERPL-CCNC: 12 U/L (ref 15–37)
BASOPHILS # BLD: 0.1 K/UL (ref 0–0.1)
BASOPHILS NFR BLD: 1 % (ref 0–2)
BILIRUB DIRECT SERPL-MCNC: 0.1 MG/DL (ref 0–0.2)
BILIRUB SERPL-MCNC: 0.5 MG/DL (ref 0.2–1)
BILIRUB UR QL: NEGATIVE
BUN SERPL-MCNC: 11 MG/DL (ref 7–18)
BUN/CREAT SERPL: 18 (ref 12–20)
CALCIUM SERPL-MCNC: 9.7 MG/DL (ref 8.5–10.1)
CHLORIDE SERPL-SCNC: 100 MMOL/L (ref 100–108)
CHOLEST SERPL-MCNC: 166 MG/DL
CO2 SERPL-SCNC: 31 MMOL/L (ref 21–32)
COLOR UR: YELLOW
CREAT SERPL-MCNC: 0.62 MG/DL (ref 0.6–1.3)
DIFFERENTIAL METHOD BLD: ABNORMAL
EOSINOPHIL # BLD: 0.4 K/UL (ref 0–0.4)
EOSINOPHIL NFR BLD: 4 % (ref 0–5)
ERYTHROCYTE [DISTWIDTH] IN BLOOD BY AUTOMATED COUNT: 13.4 % (ref 11.6–14.5)
GLOBULIN SER CALC-MCNC: 3.2 G/DL (ref 2–4)
GLUCOSE SERPL-MCNC: 129 MG/DL (ref 74–99)
GLUCOSE UR STRIP.AUTO-MCNC: NEGATIVE MG/DL
HBA1C MFR BLD: 6.5 % (ref 4.2–5.6)
HCT VFR BLD AUTO: 39.8 % (ref 35–45)
HDLC SERPL-MCNC: 48 MG/DL (ref 40–60)
HDLC SERPL: 3.5 {RATIO} (ref 0–5)
HGB BLD-MCNC: 12.8 G/DL (ref 12–16)
HGB UR QL STRIP: NEGATIVE
KETONES UR QL STRIP.AUTO: NEGATIVE MG/DL
LDLC SERPL CALC-MCNC: 93 MG/DL (ref 0–100)
LEUKOCYTE ESTERASE UR QL STRIP.AUTO: NEGATIVE
LIPID PROFILE,FLP: NORMAL
LYMPHOCYTES # BLD: 2.1 K/UL (ref 0.9–3.6)
LYMPHOCYTES NFR BLD: 18 % (ref 21–52)
MCH RBC QN AUTO: 29.2 PG (ref 24–34)
MCHC RBC AUTO-ENTMCNC: 32.2 G/DL (ref 31–37)
MCV RBC AUTO: 90.9 FL (ref 74–97)
MONOCYTES # BLD: 0.7 K/UL (ref 0.05–1.2)
MONOCYTES NFR BLD: 6 % (ref 3–10)
NEUTS SEG # BLD: 8.4 K/UL (ref 1.8–8)
NEUTS SEG NFR BLD: 71 % (ref 40–73)
NITRITE UR QL STRIP.AUTO: NEGATIVE
PH UR STRIP: >8.5 [PH] (ref 5–8)
PLATELET # BLD AUTO: 203 K/UL (ref 135–420)
PMV BLD AUTO: 12.5 FL (ref 9.2–11.8)
POTASSIUM SERPL-SCNC: 3.9 MMOL/L (ref 3.5–5.5)
PROT SERPL-MCNC: 7.7 G/DL (ref 6.4–8.2)
PROT UR STRIP-MCNC: NEGATIVE MG/DL
RBC # BLD AUTO: 4.38 M/UL (ref 4.2–5.3)
SODIUM SERPL-SCNC: 140 MMOL/L (ref 136–145)
SP GR UR REFRACTOMETRY: 1.02 (ref 1–1.03)
T4 FREE SERPL-MCNC: 1.2 NG/DL (ref 0.7–1.5)
TRIGL SERPL-MCNC: 125 MG/DL (ref ?–150)
TSH SERPL DL<=0.05 MIU/L-ACNC: 2.6 UIU/ML (ref 0.36–3.74)
UROBILINOGEN UR QL STRIP.AUTO: 0.2 EU/DL (ref 0.2–1)
VLDLC SERPL CALC-MCNC: 25 MG/DL
WBC # BLD AUTO: 11.7 K/UL (ref 4.6–13.2)

## 2019-01-15 PROCEDURE — 81003 URINALYSIS AUTO W/O SCOPE: CPT

## 2019-01-15 PROCEDURE — 82043 UR ALBUMIN QUANTITATIVE: CPT

## 2019-01-15 PROCEDURE — 84439 ASSAY OF FREE THYROXINE: CPT

## 2019-01-15 PROCEDURE — 80076 HEPATIC FUNCTION PANEL: CPT

## 2019-01-15 PROCEDURE — 80048 BASIC METABOLIC PNL TOTAL CA: CPT

## 2019-01-15 PROCEDURE — 84443 ASSAY THYROID STIM HORMONE: CPT

## 2019-01-15 PROCEDURE — 85025 COMPLETE CBC W/AUTO DIFF WBC: CPT

## 2019-01-15 PROCEDURE — 83036 HEMOGLOBIN GLYCOSYLATED A1C: CPT

## 2019-01-15 PROCEDURE — 80061 LIPID PANEL: CPT

## 2019-01-15 PROCEDURE — 36415 COLL VENOUS BLD VENIPUNCTURE: CPT

## 2019-01-15 RX ORDER — FLUTICASONE PROPIONATE AND SALMETEROL 250; 50 UG/1; UG/1
1 POWDER RESPIRATORY (INHALATION) EVERY 12 HOURS
Qty: 3 INHALER | Refills: 2 | Status: SHIPPED | OUTPATIENT
Start: 2019-01-15 | End: 2019-07-19 | Stop reason: SDUPTHER

## 2019-01-15 NOTE — PROGRESS NOTES
This is the Subsequent Medicare Annual Wellness Exam, performed 12 months or more after the Initial AWV or the last Subsequent AWV I have reviewed the patient's medical history in detail and updated the computerized patient record. History Past Medical History:  
Diagnosis Date  Acquired hypothyroidism 2016  Asthma 2016  Benign positional vertigo 2016  Depression  Diabetes ([address]  Diabetic neuropathy ([address] feet  Dizziness  GERD (gastroesophageal reflux disease)  HLD (hyperlipidemia) 2016  Hypercholesterolemia  Hypertension  Sleep disturbance 2016  Thyroid disease Past Surgical History:  
Procedure Laterality Date  HX APPENDECTOMY  2016 Dr. Ranjan Browning  HX  SECTION    
 HX CHOLECYSTECTOMY  HX COLONOSCOPY  2016 Repeat colonoscopy in 5 years per Dr. Gaudencio Castillo; last 16  HX HERNIA REPAIR  2017 Dr. Gisel Nichols. Current Outpatient Medications Medication Sig Dispense Refill  fluticasone-salmeterol (ADVAIR) 250-50 mcg/dose diskus inhaler Take 1 Puff by inhalation every twelve (12) hours. 3 Inhaler 2  
 docusate sodium (COLACE) 100 mg capsule Take 1 Cap by mouth two (2) times a day. 180 Cap 0  
 glipiZIDE (GLUCOTROL) 5 mg tablet Take 1 Tab by mouth two (2) times a day. 180 Tab 0  
 PARoxetine (PAXIL) 40 mg tablet Take 1 Tab by mouth daily. 90 Tab 0  
 fenofibrate nanocrystallized (TRICOR) 48 mg tablet Take 1 Tab by mouth daily. 90 Tab 0  
 lisinopril (PRINIVIL, ZESTRIL) 40 mg tablet Take 1 Tab by mouth daily. 90 Tab 0  propranolol LA (INDERAL LA) 60 mg SR capsule Take 1 Cap by mouth daily. 90 Cap 1  
 metFORMIN (GLUCOPHAGE) 1,000 mg tablet Take 1 Tab by mouth two (2) times daily (with meals). 180 Tab 1  cetirizine (ZYRTEC) 10 mg tablet Take 1 Tab by mouth daily. 90 Tab 1  chlorthalidone (HYGROTEN) 25 mg tablet Take 1 Tab by mouth daily.  90 Tab 0  
  SITagliptin (JANUVIA) 100 mg tablet Take 1 Tab by mouth daily. 90 Tab 1  
 atorvastatin (LIPITOR) 10 mg tablet Take 1 Tab by mouth daily. 90 Tab 1  
 albuterol (PROVENTIL HFA, VENTOLIN HFA, PROAIR HFA) 90 mcg/actuation inhaler Take 2 Puffs by inhalation every four (4) hours as needed for Wheezing. 2 Inhaler 2  potassium chloride (KLOR-CON) 10 mEq tablet Take 1 Tab by mouth daily. 90 Tab 1  polyethylene glycol (MIRALAX) 17 gram packet Take 1 Packet by mouth daily. 90 Packet 0  
 levothyroxine (SYNTHROID) 50 mcg tablet Take 1 Tab by mouth Daily (before breakfast). 90 Tab 0  
 gabapentin (NEURONTIN) 100 mg capsule Take 1 Cap by mouth three (3) times daily. 270 Cap 0  
 alendronate (FOSAMAX) 70 mg tablet Take 1 Tab by mouth every seven (7) days. 12 Tab 2  
 fluticasone (FLONASE) 50 mcg/actuation nasal spray 2 Sprays by Both Nostrils route daily. 1 Bottle 2  
 garlic 9,978 mg cap Take  by mouth.  ginger, Zingiber officinalis, (GINGER EXTRACT) 250 mg cap Take  by mouth.  Omega-3-DHA-EPA-Fish Oil 1,000 mg (120 mg-180 mg) cap Take  by mouth daily. Allergies Allergen Reactions  Codeine Nausea and Vomiting Family History Problem Relation Age of Onset  Diabetes Mother  Heart Disease Mother  Thyroid Disease Sister Social History Tobacco Use  Smoking status: Never Smoker  Smokeless tobacco: Never Used Substance Use Topics  Alcohol use: Yes Patient Active Problem List  
Diagnosis Code  Hypertension I10  
 Diabetes ([facility] E11.9  GERD (gastroesophageal reflux disease) K21.9  Depression F32.9  
 HLD (hyperlipidemia) E78.5  Acquired hypothyroidism E03.9  Benign positional vertigo H81.10  Asthma J45.909  Sleep disturbance G47.9 Depression Risk Factor Screening: PHQ over the last two weeks 1/15/2019 PHQ Not Done - Little interest or pleasure in doing things Not at all Feeling down, depressed, irritable, or hopeless Not at all Total Score PHQ 2 0 Alcohol Risk Factor Screening: You do not drink alcohol or very rarely. Functional Ability and Level of Safety:  
Hearing Loss Hearing is good. Activities of Daily Living The home contains: no safety equipment. Patient does total self care Fall Risk Fall Risk Assessment, last 12 mths 1/15/2019 Able to walk? Yes Fall in past 12 months? No  
Fall with injury? -  
Number of falls in past 12 months - Fall Risk Score -  
 
 
Abuse Screen Patient is not abused Cognitive Screening Evaluation of Cognitive Function: 
Has your family/caregiver stated any concerns about your memory: no 
Normal 
 
Patient Care Team  
Patient Care Team: 
Celi Osullivan MD as PCP - General (Internal Medicine) Lesly Bolivar NP (Nurse Practitioner) Lan Campo MD (Colon and Rectal Surgery) Assessment/Plan Education and counseling provided: 
Are appropriate based on today's review and evaluation Diagnoses and all orders for this visit: 
 
1. Medicare annual wellness visit, subsequent 2. Breast cancer screening -     [facility] MAMMO BI SCREENING INCL CAD; Future 3. Encounter for immunization -     ADMIN PNEUMOCOCCAL VACCINE 
-     PNEUMOCOCCAL CONJ VACCINE 13 VALENT IM 
 
4. Other chest pain -     AMB POC EKG ROUTINE W/ 12 LEADS, INTER & REP 5. Essential hypertension 6. Type 2 diabetes mellitus with diabetic polyneuropathy, without long-term current use of insulin ([address] 7. Pure hypercholesterolemia Other orders 
-     fluticasone-salmeterol (ADVAIR) 250-50 mcg/dose diskus inhaler; Take 1 Puff by inhalation every twelve (12) hours. Health Maintenance Due Topic Date Due  Shingrix Vaccine Age 50> (1 of 2) 04/05/2001  Pneumococcal 65+ Low/Medium Risk (1 of 2 - PCV13) 04/05/2016  
 FOOT EXAM Q1  10/23/2018  MICROALBUMIN Q1  10/24/2018  LIPID PANEL Q1  10/24/2018

## 2019-01-15 NOTE — PROGRESS NOTES
Prevnar 13 Immunization/s administered 1/15/2019 by Angeles Lu LPN with guardian's consent. Patient tolerated procedure well. No reactions noted.

## 2019-01-15 NOTE — PROGRESS NOTES
Leanne Mtz is a 80 y.o. female (: 1950) presenting to address: Chief Complaint Patient presents with Syed Annual Wellness Visit Vitals:  
 01/15/19 1013 BP: 128/70 Pulse: 62 Resp: 18 Temp: 97.7 °F (36.5 °C) TempSrc: Oral  
SpO2: 97% Weight: 141 lb (64 kg) Height: 5' 1\" (1.549 m) PainSc:   0 - No pain Hearing/Vision:  
 
 Hearing Screening [address] [facility] Right ear: Fail 40 Fail  40 Left ear: Fail Fail 40  Fail Visual Acuity Screening Right eye Left eye Both eyes Without correction: 20/30 20/30 20/30 With correction:     
 
 
Learning Assessment:  
 
Learning Assessment 2016 PRIMARY LEARNER Patient HIGHEST LEVEL OF EDUCATION - PRIMARY LEARNER  SOME COLLEGE PRIMARY LANGUAGE ENGLISH  
LEARNER PREFERENCE PRIMARY DEMONSTRATION  
ANSWERED BY patient RELATIONSHIP SELF Depression Screening: PHQ over the last two weeks 5/15/2018 PHQ Not Done - Little interest or pleasure in doing things Not at all Feeling down, depressed, irritable, or hopeless Not at all Total Score PHQ 2 0 Fall Risk Assessment:  
 
Fall Risk Assessment, last 12 mths 1/15/2019 Able to walk? Yes Fall in past 12 months? No  
Fall with injury? -  
Number of falls in past 12 months - Fall Risk Score -  
 
Abuse Screening:  
 
Abuse Screening Questionnaire 1/15/2019 Do you ever feel afraid of your partner? Dufm Salvia Are you in a relationship with someone who physically or mentally threatens you? Dufm Salvia Is it safe for you to go home? Davida Dance Coordination of Care Questionaire: 1. Have you been to the ER, urgent care clinic since your last visit? Hospitalized since your last visit? NO 
 
2. Have you seen or consulted any other health care providers outside of the [address] [place] [address] since your last visit? Include any pap smears or colon screening. NO Advanced Directive: 1. Do you have an Advanced Directive? NO 
 
2. Would you like information on Advanced Directives?  NO

## 2019-01-15 NOTE — PATIENT INSTRUCTIONS
Medicare Wellness Visit, Female The best way to live healthy is to have a lifestyle where you eat a well-balanced diet, exercise regularly, limit alcohol use, and quit all forms of tobacco/nicotine, if applicable. Regular preventive services are another way to keep healthy. Preventive services (vaccines, screening tests, monitoring & exams) can help personalize your care plan, which helps you manage your own care. Screening tests can find health problems at the earliest stages, when they are easiest to treat. [facility] follows the current, evidence-based guidelines published by the [facility] ([facility]) when recommending preventive services for our patients. Because we follow these guidelines, sometimes recommendations change over time as research supports it. (For example, mammograms used to be recommended annually. Even though Medicare will still pay for an annual mammogram, the newer guidelines recommend a mammogram every two years for women of average risk.) Of course, you and your doctor may decide to screen more often for some diseases, based on your risk and your health status. Preventive services for you include: - Medicare offers their members a free annual wellness visit, which is time for you and your primary care provider to discuss and plan for your preventive service needs. Take advantage of this benefit every year! 
-All adults over the age of 72 should receive the recommended pneumonia vaccines. Current USPSTF guidelines recommend a series of two vaccines for the best pneumonia protection.  
-All adults should have a flu vaccine yearly and a tetanus vaccine every 10 years. All adults age 61 and older should receive a shingles vaccine once in their lifetime.   
-A bone mass density test is recommended when a woman turns 65 to screen for osteoporosis. This test is only recommended one time, as a screening. Some providers will use this same test as a disease monitoring tool if you already have osteoporosis. -All adults age 38-68 who are overweight should have a diabetes screening test once every three years.  
-Other screening tests and preventive services for persons with diabetes include: an eye exam to screen for diabetic retinopathy, a kidney function test, a foot exam, and stricter control over your cholesterol.  
-Cardiovascular screening for adults with routine risk involves an electrocardiogram (ECG) at intervals determined by your doctor.  
-Colorectal cancer screenings should be done for adults age 54-65 with no increased risk factors for colorectal cancer. There are a number of acceptable methods of screening for this type of cancer. Each test has its own benefits and drawbacks. Discuss with your doctor what is most appropriate for you during your annual wellness visit. The different tests include: colonoscopy (considered the best screening method), a fecal occult blood test, a fecal DNA test, and sigmoidoscopy. -Breast cancer screenings are recommended every other year for women of normal risk, age 54-69. 
-Cervical cancer screenings for women over age 72 are only recommended with certain risk factors.  
-All adults born between [facility] should be screened once for Hepatitis C. Here is a list of your current Health Maintenance items (your personalized list of preventive services) with a due date: 
Health Maintenance Due Topic Date Due  Shingles Vaccine (1 of 2) 04/05/2001  Pneumococcal Vaccine (1 of 2 - PCV13) 04/05/2016  Diabetic Foot Care  10/23/2018  Albumin Urine Test  10/24/2018  Cholesterol Test   10/24/2018

## 2019-01-15 NOTE — PROGRESS NOTES
Assessment/Plan: *Diagnoses and all orders for this visit: 1. Essential hypertension 2. Breast cancer screening -     [facility] MAMMO BI SCREENING INCL CAD; Future 3. Medicare annual wellness visit, subsequent 4. Encounter for immunization -     ADMIN PNEUMOCOCCAL VACCINE 
-     PNEUMOCOCCAL CONJ VACCINE 13 VALENT IM 
 
5. Other chest pain -     AMB POC EKG ROUTINE W/ 12 LEADS, INTER & REP 6. Type 2 diabetes mellitus with diabetic polyneuropathy, without long-term current use of insulin ([facility] 7. Pure hypercholesterolemia Other orders 
-     fluticasone-salmeterol (ADVAIR) 250-50 mcg/dose diskus inhaler; Take 1 Puff by inhalation every twelve (12) hours. EKG: NSR, no ST - T wave changes. F/u 4 months. Just A1c. The plan was discussed with the patient. The patient verbalized understanding and is in agreement with the plan. All medication potential side effects were discussed with the patient. 
 
------------------------------------------------------------------------------------------------------------------- Unknown Bianca is a 80 y.o. female and presents with Annual Wellness Visit Subjective: 
Pt here for f/u. Has been having episodes of chest pain, when carrying heavy bags and other times. She is not very clear on other episodes but has risk factors for CAD like DM, HTN, HLD. DM: will repeat labs today. HTN: well controlled. HLD: will get new labs today. ROS: 
Review of Systems - Negative except as above. The problem list was updated as a part of today's visit. Patient Active Problem List  
Diagnosis Code  Hypertension I10  
 Diabetes ([facility] E11.9  GERD (gastroesophageal reflux disease) K21.9  Depression F32.9  
 HLD (hyperlipidemia) E78.5  Acquired hypothyroidism E03.9  Benign positional vertigo H81.10  Asthma J45.909  Sleep disturbance G47.9 The PSH, FH were reviewed. SH: Social History Tobacco Use  Smoking status: Never Smoker  Smokeless tobacco: Never Used Substance Use Topics  Alcohol use: Yes  Drug use: No  
 
 
Medications/Allergies: 
Current Outpatient Medications on File Prior to Visit Medication Sig Dispense Refill  docusate sodium (COLACE) 100 mg capsule Take 1 Cap by mouth two (2) times a day. 180 Cap 0  
 glipiZIDE (GLUCOTROL) 5 mg tablet Take 1 Tab by mouth two (2) times a day. 180 Tab 0  
 PARoxetine (PAXIL) 40 mg tablet Take 1 Tab by mouth daily. 90 Tab 0  
 fenofibrate nanocrystallized (TRICOR) 48 mg tablet Take 1 Tab by mouth daily. 90 Tab 0  
 lisinopril (PRINIVIL, ZESTRIL) 40 mg tablet Take 1 Tab by mouth daily. 90 Tab 0  propranolol LA (INDERAL LA) 60 mg SR capsule Take 1 Cap by mouth daily. 90 Cap 1  
 metFORMIN (GLUCOPHAGE) 1,000 mg tablet Take 1 Tab by mouth two (2) times daily (with meals). 180 Tab 1  cetirizine (ZYRTEC) 10 mg tablet Take 1 Tab by mouth daily. 90 Tab 1  chlorthalidone (HYGROTEN) 25 mg tablet Take 1 Tab by mouth daily. 90 Tab 0  
 SITagliptin (JANUVIA) 100 mg tablet Take 1 Tab by mouth daily. 90 Tab 1  
 atorvastatin (LIPITOR) 10 mg tablet Take 1 Tab by mouth daily. 90 Tab 1  
 albuterol (PROVENTIL HFA, VENTOLIN HFA, PROAIR HFA) 90 mcg/actuation inhaler Take 2 Puffs by inhalation every four (4) hours as needed for Wheezing. 2 Inhaler 2  potassium chloride (KLOR-CON) 10 mEq tablet Take 1 Tab by mouth daily. 90 Tab 1  polyethylene glycol (MIRALAX) 17 gram packet Take 1 Packet by mouth daily. 90 Packet 0  
 levothyroxine (SYNTHROID) 50 mcg tablet Take 1 Tab by mouth Daily (before breakfast). 90 Tab 0  
 gabapentin (NEURONTIN) 100 mg capsule Take 1 Cap by mouth three (3) times daily. 270 Cap 0  
 alendronate (FOSAMAX) 70 mg tablet Take 1 Tab by mouth every seven (7) days. 12 Tab 2  
 fluticasone (FLONASE) 50 mcg/actuation nasal spray 2 Sprays by Both Nostrils route daily.  1 Bottle 2  
  garlic 7,975 mg cap Take  by mouth.  willie, Zingiber officinalis, (WILLIE EXTRACT) 250 mg cap Take  by mouth.  Omega-3-DHA-EPA-Fish Oil 1,000 mg (120 mg-180 mg) cap Take  by mouth daily. No current facility-administered medications on file prior to visit. Allergies Allergen Reactions  Codeine Nausea and Vomiting Health Maintenance:  
Health Maintenance Topic Date Due  Shingrix Vaccine Age 50> (1 of 2) 04/05/2001  Pneumococcal 65+ Low/Medium Risk (1 of 2 - PCV13) 04/05/2016  
 FOOT EXAM Q1  10/23/2018  MICROALBUMIN Q1  10/24/2018  LIPID PANEL Q1  10/24/2018  MEDICARE YEARLY EXAM  01/31/2019  
 HEMOGLOBIN A1C Q6M  03/11/2019  BREAST CANCER SCRN MAMMOGRAM  11/16/2019  GLAUCOMA SCREENING Q2Y  04/17/2020  
 EYE EXAM RETINAL OR DILATED  04/17/2020  
 DTaP/Tdap/Td series (2 - Td) 08/11/2020  COLONOSCOPY  12/21/2021  Hepatitis C Screening  Completed  Bone Densitometry (Dexa) Screening  Completed  Influenza Age 5 to Adult  Completed Objective: 
Visit Vitals /70 (BP 1 Location: Left arm, BP Patient Position: Sitting) Pulse 62 Temp 97.7 °F (36.5 °C) (Oral) Resp 18 Ht 5' 1\" (1.549 m) Wt 141 lb (64 kg) SpO2 97% BMI 26.64 kg/m² Nurses notes and VS reviewed. Physical Examination: General appearance - alert, well appearing, and in no distress Ears - bilateral TM's and external ear canals normal 
Mouth - mucous membranes moist, pharynx normal without lesions Neck - supple, no significant adenopathy Chest - clear to auscultation, no wheezes, rales or rhonchi, symmetric air entry Heart - normal rate, regular rhythm, normal S1, S2, no murmurs, rubs, clicks or gallops Abdomen - soft, nontender, nondistended, no masses or organomegaly Musculoskeletal - no joint tenderness, deformity or swelling Extremities - peripheral pulses normal, no pedal edema, no clubbing or cyanosis Labwork and Ancillary Studies: CBC w/Diff Lab Results Component Value Date/Time WBC 9.5 08/20/2018 08:21 AM  
 HGB 12.9 08/20/2018 08:21 AM  
 PLATELET 726 83/85/7359 08:21 AM  
  
 
 Basic Metabolic Profile Lab Results Component Value Date/Time Sodium 137 08/20/2018 08:21 AM  
 Potassium 3.6 08/20/2018 08:21 AM  
 Chloride 99 (L) 08/20/2018 08:21 AM  
 CO2 31 08/20/2018 08:21 AM  
 Anion gap 7 08/20/2018 08:21 AM  
 Glucose 326 (H) 08/20/2018 08:21 AM  
 BUN 12 08/20/2018 08:21 AM  
 Creatinine 0.80 08/20/2018 08:21 AM  
 BUN/Creatinine ratio 15 08/20/2018 08:21 AM  
 GFR est AA >60 08/20/2018 08:21 AM  
 GFR est non-AA >60 08/20/2018 08:21 AM  
 Calcium 9.1 08/20/2018 08:21 AM  
  
  
LFT Lab Results Component Value Date/Time ALT (SGPT) 19 08/20/2018 08:21 AM  
 AST (SGOT) 14 (L) 08/20/2018 08:21 AM  
 Alk. phosphatase 64 08/20/2018 08:21 AM  
 Bilirubin, direct 0.2 08/20/2018 08:21 AM  
 Bilirubin, total 0.5 08/20/2018 08:21 AM  
 
 
 
Cholesterol Lab Results Component Value Date/Time  Cholesterol, total 132 10/24/2017 10:39 AM  
 HDL Cholesterol 45 10/24/2017 10:39 AM  
 LDL, calculated 64.2 10/24/2017 10:39 AM  
 Triglyceride 114 10/24/2017 10:39 AM  
 CHOL/HDL Ratio 2.9 10/24/2017 10:39 AM

## 2019-01-16 LAB
CREAT UR-MCNC: 128 MG/DL (ref 30–125)
MICROALBUMIN UR-MCNC: 1.61 MG/DL (ref 0–3)
MICROALBUMIN/CREAT UR-RTO: 13 MG/G (ref 0–30)

## 2019-01-25 RX ORDER — CHLORTHALIDONE 25 MG/1
25 TABLET ORAL DAILY
Qty: 90 TAB | Refills: 1 | Status: SHIPPED | OUTPATIENT
Start: 2019-01-25 | End: 2019-09-12 | Stop reason: SDUPTHER

## 2019-02-04 DIAGNOSIS — F32.89 OTHER DEPRESSION: ICD-10-CM

## 2019-02-04 RX ORDER — PAROXETINE HYDROCHLORIDE 40 MG/1
40 TABLET, FILM COATED ORAL DAILY
Qty: 90 TAB | Refills: 0 | Status: CANCELLED | OUTPATIENT
Start: 2019-02-04

## 2019-02-04 NOTE — TELEPHONE ENCOUNTER
Requested Prescriptions     Pending Prescriptions Disp Refills    PARoxetine (PAXIL) 40 mg tablet 90 Tab 0     Sig: Take 1 Tab by mouth daily. Patient calling to request refill on: paroxetine      Remaining pills: 1  Last appt: 1/15/18  Next appt: 5/14/19    Pharmacy: [facility] [address] [facility]      Patient aware of 72 hour time frame. Pt is aware that she does not have any refills but she says that she needs this med and does not want to come in just for refill because she already has an established appointment.

## 2019-03-05 DIAGNOSIS — F32.89 OTHER DEPRESSION: ICD-10-CM

## 2019-03-05 RX ORDER — PAROXETINE HYDROCHLORIDE 40 MG/1
40 TABLET, FILM COATED ORAL DAILY
Qty: 30 TAB | Refills: 0 | Status: SHIPPED | OUTPATIENT
Start: 2019-03-05 | End: 2019-09-12 | Stop reason: SDUPTHER

## 2019-03-05 NOTE — TELEPHONE ENCOUNTER
Pt said she has lost her pill bottle and has been out of it for a week now. She said that it has been making her feel really dizzy without this medication. She said she still has a refill left at the [address] but they will not refill it for her until April. I told her Dr. Sarai Monge is out of the office and she is wondering if a covering provider could approve this medication refill for her. Requested Prescriptions     Pending Prescriptions Disp Refills    PARoxetine (PAXIL) 40 mg tablet 90 Tab 0     Sig: Take 1 Tab by mouth daily. Remaining pills:0  Last appt:01/15/19  Next appt:03/14/19    Pharmacy: [place]      Patient aware of 72 hour time frame.

## 2019-03-05 NOTE — TELEPHONE ENCOUNTER
Called patient and she stated that she just needs a 30 day supply to get her through until  will refill her next 90 day refill, which isn't until beginning of April.

## 2019-03-05 NOTE — TELEPHONE ENCOUNTER
Called patient to let her know that the prescription has been sent to [facility] on [address]. Patient verbalized understanding.

## 2019-03-12 DIAGNOSIS — E03.9 ACQUIRED HYPOTHYROIDISM: ICD-10-CM

## 2019-03-12 DIAGNOSIS — R51.9 PERSISTENT HEADACHES: ICD-10-CM

## 2019-03-12 RX ORDER — PROPRANOLOL HYDROCHLORIDE 60 MG/1
60 CAPSULE, EXTENDED RELEASE ORAL DAILY
Qty: 90 CAP | Refills: 1 | Status: CANCELLED | OUTPATIENT
Start: 2019-03-12

## 2019-03-12 RX ORDER — POTASSIUM CHLORIDE 750 MG/1
10 TABLET, EXTENDED RELEASE ORAL DAILY
Qty: 90 TAB | Refills: 1 | Status: CANCELLED | OUTPATIENT
Start: 2019-03-12

## 2019-03-12 NOTE — TELEPHONE ENCOUNTER
Propanolol last ordered 12/05/18, quantity of 90 with 1 refill. Tricor last ordered 12/31/19, quantity of 90 with 0 refills. Synthroid last ordered 07/11/18, quantity of 90 with 0 refills. Potassium ordered 08/20/18, quantity of 90 with 1 refill.

## 2019-03-12 NOTE — TELEPHONE ENCOUNTER
Requested Prescriptions     Pending Prescriptions Disp Refills    levothyroxine (SYNTHROID) 50 mcg tablet 90 Tab 0     Sig: Take 1 Tab by mouth Daily (before breakfast).  propranolol LA (INDERAL LA) 60 mg SR capsule 90 Cap 1     Sig: Take 1 Cap by mouth daily.  fenofibrate nanocrystallized (TRICOR) 48 mg tablet 90 Tab 0     Sig: Take 1 Tab by mouth daily. Patient calling to request refill on: levothyroxine, propranolol, fenofibrate      Remaining pills:  Last appt:1/15/2019  Next appt:5/14/2019    Pharmacy: [facility]      Patient aware of 72 hour time frame.

## 2019-03-13 RX ORDER — LEVOTHYROXINE SODIUM 50 UG/1
50 TABLET ORAL
Qty: 90 TAB | Refills: 0 | Status: SHIPPED | OUTPATIENT
Start: 2019-03-13 | End: 2019-03-19 | Stop reason: SDUPTHER

## 2019-03-13 RX ORDER — POTASSIUM CHLORIDE 750 MG/1
10 TABLET, EXTENDED RELEASE ORAL DAILY
Qty: 90 TAB | Refills: 0 | Status: SHIPPED | OUTPATIENT
Start: 2019-03-13 | End: 2019-03-19 | Stop reason: SDUPTHER

## 2019-03-13 RX ORDER — FENOFIBRATE 48 MG/1
48 TABLET, COATED ORAL DAILY
Qty: 90 TAB | Refills: 0 | Status: SHIPPED | OUTPATIENT
Start: 2019-03-13 | End: 2019-03-19 | Stop reason: SDUPTHER

## 2019-03-19 DIAGNOSIS — R51.9 PERSISTENT HEADACHES: ICD-10-CM

## 2019-03-19 DIAGNOSIS — E03.9 ACQUIRED HYPOTHYROIDISM: ICD-10-CM

## 2019-03-19 RX ORDER — PROPRANOLOL HYDROCHLORIDE 60 MG/1
60 CAPSULE, EXTENDED RELEASE ORAL DAILY
Qty: 90 CAP | Refills: 1 | Status: CANCELLED | OUTPATIENT
Start: 2019-03-19

## 2019-03-19 NOTE — TELEPHONE ENCOUNTER
Physical Exam  Mallampati: I  TM Distance: >3 FB  Neck ROM: Full  Cardio Rhythm: Regular  Cardio Rate: Normal  Breath sounds clear to auscultation:  Yes  Patient Demonstrates:  Gravid  dental exam normal      Anesthesia Plan  ASA Status: 2  Anesthesia Type: L&D Epidural  Reviewed: Lab Results, Nursing Notes, Consultations, Patient Summary, NPO Status, Allergies, Medications, Problem List and Past Med History  The proposed anesthetic plan, including its risks and benefits, have been discussed with the Patient - along with the risks and benefits of alternatives.  Questions were encouraged and answered and the patient and/or representative agrees to proceed.      Anesthesia ROS/Med Hx        Pulmonary Review:    Negative for pulmonary    Neuro/Psych Review:    Negative for all neuro/psych ROS    Cardiovascular Review:    Pt. negative for all cardio ROS    GI/HEPATIC/RENAL Review:    Pt. negative for GI/Hepatic/Renal ROS    End/Other Review:    Pt. negative for endo/other ROS     Pt stated that the prescription were sent to [address] [place] [address] and she needed them sent to [facility]           levothyroxine (SYNTHROID) 50 mcg tablet 90 Tab 0       Sig: Take 1 Tab by mouth Daily (before breakfast).  propranolol LA (INDERAL LA) 60 mg SR capsule 90 Cap 1       Sig: Take 1 Cap by mouth daily.     fenofibrate nanocrystallized (TRICOR) 48 mg tablet 90 Tab 0       Sig: Take 1 Tab by mouth daily.      Patient calling to request refill on: levothyroxine, propranolol, fenofibrate        Remaining pills:  Last appt:1/15/2019  Next appt:5/14/2019

## 2019-03-20 RX ORDER — POTASSIUM CHLORIDE 750 MG/1
10 TABLET, EXTENDED RELEASE ORAL DAILY
Qty: 90 TAB | Refills: 0 | Status: SHIPPED | OUTPATIENT
Start: 2019-03-20 | End: 2019-07-03 | Stop reason: SDUPTHER

## 2019-03-20 RX ORDER — LEVOTHYROXINE SODIUM 50 UG/1
50 TABLET ORAL
Qty: 90 TAB | Refills: 0 | Status: SHIPPED | OUTPATIENT
Start: 2019-03-20 | End: 2019-06-17 | Stop reason: SDUPTHER

## 2019-03-20 RX ORDER — FENOFIBRATE 48 MG/1
48 TABLET, COATED ORAL DAILY
Qty: 90 TAB | Refills: 0 | Status: SHIPPED | OUTPATIENT
Start: 2019-03-20 | End: 2019-07-03 | Stop reason: SDUPTHER

## 2019-03-20 NOTE — TELEPHONE ENCOUNTER
Patient's Propanolol sent to [facility] in December with a quantity of 90 and 1 refill. All other medications were reordred on 03/13/19 but sent to the wrong pharmacy - patient wanted them to go to [address] [place] [address].

## 2019-04-08 DIAGNOSIS — R51.9 PERSISTENT HEADACHES: ICD-10-CM

## 2019-04-08 DIAGNOSIS — I10 ESSENTIAL HYPERTENSION: ICD-10-CM

## 2019-04-08 DIAGNOSIS — E11.42 TYPE 2 DIABETES MELLITUS WITH DIABETIC POLYNEUROPATHY, WITHOUT LONG-TERM CURRENT USE OF INSULIN (HCC): ICD-10-CM

## 2019-04-08 NOTE — TELEPHONE ENCOUNTER
Patient is requesting a med refill of the following:  Lisinopril 40 mg  Propranolol LA 60 mg  Glipizide 5 mg  Gabapentin 100 mg    Last filled: 12/2018  Last visit: 1/15/19  Next appointment: none

## 2019-04-09 ENCOUNTER — OFFICE VISIT (OUTPATIENT)
Dept: FAMILY MEDICINE CLINIC | Age: 69
End: 2019-04-09

## 2019-04-09 VITALS
HEART RATE: 69 BPM | TEMPERATURE: 97.5 F | SYSTOLIC BLOOD PRESSURE: 148 MMHG | WEIGHT: 144 LBS | OXYGEN SATURATION: 97 % | HEIGHT: 61 IN | DIASTOLIC BLOOD PRESSURE: 70 MMHG | BODY MASS INDEX: 27.19 KG/M2 | RESPIRATION RATE: 16 BRPM

## 2019-04-09 DIAGNOSIS — I10 ESSENTIAL HYPERTENSION: ICD-10-CM

## 2019-04-09 DIAGNOSIS — R42 DIZZINESS: Primary | ICD-10-CM

## 2019-04-09 RX ORDER — PROPRANOLOL HYDROCHLORIDE 60 MG/1
60 CAPSULE, EXTENDED RELEASE ORAL DAILY
Qty: 90 CAP | Refills: 1 | Status: SHIPPED | OUTPATIENT
Start: 2019-04-09 | End: 2020-01-01 | Stop reason: SDUPTHER

## 2019-04-09 RX ORDER — FENOFIBRATE 48 MG/1
48 TABLET, COATED ORAL DAILY
Qty: 90 TAB | Refills: 0 | Status: CANCELLED | OUTPATIENT
Start: 2019-04-09

## 2019-04-09 RX ORDER — GABAPENTIN 100 MG/1
100 CAPSULE ORAL 3 TIMES DAILY
Qty: 270 CAP | Refills: 1 | Status: SHIPPED | OUTPATIENT
Start: 2019-04-09 | End: 2020-01-01 | Stop reason: SDUPTHER

## 2019-04-09 RX ORDER — GLIPIZIDE 5 MG/1
5 TABLET ORAL 2 TIMES DAILY
Qty: 180 TAB | Refills: 1 | Status: SHIPPED | OUTPATIENT
Start: 2019-04-09 | End: 2020-01-01 | Stop reason: SDUPTHER

## 2019-04-09 RX ORDER — LISINOPRIL 40 MG/1
40 TABLET ORAL DAILY
Qty: 90 TAB | Refills: 1 | Status: SHIPPED | OUTPATIENT
Start: 2019-04-09 | End: 2020-01-01 | Stop reason: SDUPTHER

## 2019-04-09 NOTE — PROGRESS NOTES
Nayan Keller is a 77 y.o. female (: 1950) presenting to address: Chief Complaint Patient presents with  Dizziness  
  for weeks comes and goes  Abdominal Pain Vitals:  
 19 1456 BP: 150/64 Pulse: 69 Resp: 16 Temp: 97.5 °F (36.4 °C) TempSrc: Oral  
SpO2: 97% Weight: 144 lb (65.3 kg) Height: 5' 1\" (1.549 m) PainSc:   5 PainLoc: Head Hearing/Vision: No exam data present Learning Assessment:  
 
Learning Assessment 2016 PRIMARY LEARNER Patient HIGHEST LEVEL OF EDUCATION - PRIMARY LEARNER  SOME COLLEGE PRIMARY LANGUAGE ENGLISH  
LEARNER PREFERENCE PRIMARY DEMONSTRATION  
ANSWERED BY patient RELATIONSHIP SELF Depression Screening:  
 
3 most recent PHQ Screens 1/15/2019 PHQ Not Done - Little interest or pleasure in doing things Not at all Feeling down, depressed, irritable, or hopeless Not at all Total Score PHQ 2 0 Fall Risk Assessment:  
 
Fall Risk Assessment, last 12 mths 2019 Able to walk? Yes Fall in past 12 months? No  
Fall with injury? -  
Number of falls in past 12 months - Fall Risk Score -  
 
Abuse Screening:  
 
Abuse Screening Questionnaire 1/15/2019 Do you ever feel afraid of your partner? Jessica Marina Are you in a relationship with someone who physically or mentally threatens you? Jessica Marnia Is it safe for you to go home? Valentina King Coordination of Care Questionaire: 1. Have you been to the ER, urgent care clinic since your last visit? Hospitalized since your last visit? NO 
 
2. Have you seen or consulted any other health care providers outside of the [address] [place] [address] since your last visit? Include any pap smears or colon screening. NO Advanced Directive: 1. Do you have an Advanced Directive? NO 
 
2. Would you like information on Advanced Directives?  NO

## 2019-04-09 NOTE — PATIENT INSTRUCTIONS
Dizziness: Care Instructions Your Care Instructions Dizziness is the feeling of unsteadiness or fuzziness in your head. It is different than having vertigo, which is a feeling that the room is spinning or that you are moving or falling. It is also different from lightheadedness, which is the feeling that you are about to faint. It can be hard to know what causes dizziness. Some people feel dizzy when they have migraine headaches. Sometimes bouts of flu can make you feel dizzy. Some medical conditions, such as heart problems or high blood pressure, can make you feel dizzy. Many medicines can cause dizziness, including medicines for high blood pressure, pain, or anxiety. If a medicine causes your symptoms, your doctor may recommend that you stop or change the medicine. If it is a problem with your heart, you may need medicine to help your heart work better. If there is no clear reason for your symptoms, your doctor may suggest watching and waiting for a while to see if the dizziness goes away on its own. Follow-up care is a key part of your treatment and safety. Be sure to make and go to all appointments, and call your doctor if you are having problems. It's also a good idea to know your test results and keep a list of the medicines you take. How can you care for yourself at home? · If your doctor recommends or prescribes medicine, take it exactly as directed. Call your doctor if you think you are having a problem with your medicine. · Do not drive while you feel dizzy. · Try to prevent falls. Steps you can take include: ? Using nonskid mats, adding grab bars near the tub, and using night-lights. ? Clearing your home so that walkways are free of anything you might trip on. 
? Letting family and friends know that you have been feeling dizzy. This will help them know how to help you. When should you call for help? Call 911 anytime you think you may need emergency care. For example, call if:   · You passed out (lost consciousness).  
  · You have dizziness along with symptoms of a heart attack. These may include: 
? Chest pain or pressure, or a strange feeling in the chest. 
? Sweating. ? Shortness of breath. ? Nausea or vomiting. ? Pain, pressure, or a strange feeling in the back, neck, jaw, or upper belly or in one or both shoulders or arms. ? Lightheadedness or sudden weakness. ? A fast or irregular heartbeat.  
  · You have symptoms of a stroke. These may include: 
? Sudden numbness, tingling, weakness, or loss of movement in your face, arm, or leg, especially on only one side of your body. ? Sudden vision changes. ? Sudden trouble speaking. ? Sudden confusion or trouble understanding simple statements. ? Sudden problems with walking or balance. ? A sudden, severe headache that is different from past headaches.  
 Call your doctor now or seek immediate medical care if: 
  · You feel dizzy and have a fever, headache, or ringing in your ears.  
  · You have new or increased nausea and vomiting.  
  · Your dizziness does not go away or comes back.  
 Watch closely for changes in your health, and be sure to contact your doctor if: 
  · You do not get better as expected. Where can you learn more? Go to http://bertha-shayla.info/. Enter B638 in the search box to learn more about \"Dizziness: Care Instructions. \" Current as of: September 23, 2018 Content Version: 11.9 © 1514-9305 [employer]. Care instructions adapted under license by [employer] (which disclaims liability or warranty for this information). If you have questions about a medical condition or this instruction, always ask your healthcare professional. [place] [address] any warranty or liability for your use of this information.

## 2019-04-09 NOTE — PROGRESS NOTES
Assessment/Plan: *Diagnoses and all orders for this visit: 1. Dizziness 
-     REFERRAL TO ENT-OTOLARYNGOLOGY 2. Essential hypertension Pt has a routine f/u next month. Will see how her reading is on that visit. The plan was discussed with the patient. The patient verbalized understanding and is in agreement with the plan. All medication potential side effects were discussed with the patient. 
 
------------------------------------------------------------------------------------------------------------------- Uhsa Rodriguez is a 77 y.o. female and presents with Dizziness (for weeks comes and goes ) and Abdominal Pain Subjective: 
Pt comes today for dizziness. She is still having issues with this but she has not been compliant with going to see ENT. I put in a referral for her in 2017 and she never went. She does not remember this. HTN: a bit elevated today. BP is typically well controlled. ROS: 
Review of Systems - Negative except as above The problem list was updated as a part of today's visit. Patient Active Problem List  
Diagnosis Code  Hypertension I10  
 Diabetes ([facility] [address] E11.9  GERD (gastroesophageal reflux disease) K21.9  Depression F32.9  
 HLD (hyperlipidemia) E78.5  Acquired hypothyroidism E03.9  Benign positional vertigo H81.10  Asthma J45.909  Sleep disturbance G47.9 The PSH, FH were reviewed. SH: Social History Tobacco Use  Smoking status: Never Smoker  Smokeless tobacco: Never Used Substance Use Topics  Alcohol use: Yes  Drug use: No  
 
 
Medications/Allergies: 
Current Outpatient Medications on File Prior to Visit Medication Sig Dispense Refill  potassium chloride (KLOR-CON) 10 mEq tablet Take 1 Tab by mouth daily. 90 Tab 0  
 fenofibrate nanocrystallized (TRICOR) 48 mg tablet Take 1 Tab by mouth daily.  90 Tab 0  
 levothyroxine (SYNTHROID) 50 mcg tablet Take 1 Tab by mouth Daily (before breakfast). 90 Tab 0  
 PARoxetine (PAXIL) 40 mg tablet Take 1 Tab by mouth daily. 30 Tab 0  chlorthalidone (HYGROTEN) 25 mg tablet Take 1 Tab by mouth daily. 90 Tab 1  
 SITagliptin (JANUVIA) 100 mg tablet Take 1 Tab by mouth daily. 90 Tab 1  
 fluticasone-salmeterol (ADVAIR) 250-50 mcg/dose diskus inhaler Take 1 Puff by inhalation every twelve (12) hours. 3 Inhaler 2  
 docusate sodium (COLACE) 100 mg capsule Take 1 Cap by mouth two (2) times a day. 180 Cap 0  
 metFORMIN (GLUCOPHAGE) 1,000 mg tablet Take 1 Tab by mouth two (2) times daily (with meals). 180 Tab 1  cetirizine (ZYRTEC) 10 mg tablet Take 1 Tab by mouth daily. 90 Tab 1  
 atorvastatin (LIPITOR) 10 mg tablet Take 1 Tab by mouth daily. 90 Tab 1  
 albuterol (PROVENTIL HFA, VENTOLIN HFA, PROAIR HFA) 90 mcg/actuation inhaler Take 2 Puffs by inhalation every four (4) hours as needed for Wheezing. 2 Inhaler 2  
 alendronate (FOSAMAX) 70 mg tablet Take 1 Tab by mouth every seven (7) days. 12 Tab 2  
 fluticasone (FLONASE) 50 mcg/actuation nasal spray 2 Sprays by Both Nostrils route daily. 1 Bottle 2  
 garlic 5,049 mg cap Take  by mouth.  ginger, Zingiber officinalis, (GINGER EXTRACT) 250 mg cap Take  by mouth.  Omega-3-DHA-EPA-Fish Oil 1,000 mg (120 mg-180 mg) cap Take  by mouth daily.  gabapentin (NEURONTIN) 100 mg capsule Take 1 Cap by mouth three (3) times daily. 270 Cap 1  
 glipiZIDE (GLUCOTROL) 5 mg tablet Take 1 Tab by mouth two (2) times a day. 180 Tab 1  propranolol LA (INDERAL LA) 60 mg SR capsule Take 1 Cap by mouth daily. Indications: Migraine Prevention 90 Cap 1  
 lisinopril (PRINIVIL, ZESTRIL) 40 mg tablet Take 1 Tab by mouth daily. 90 Tab 1  polyethylene glycol (MIRALAX) 17 gram packet Take 1 Packet by mouth daily. 90 Packet 0 No current facility-administered medications on file prior to visit. Allergies Allergen Reactions  Codeine Nausea and Vomiting Health Maintenance:  
Health Maintenance Topic Date Due  Shingrix Vaccine Age 50> (1 of 2) 04/05/2001  
 FOOT EXAM Q1  10/23/2018  HEMOGLOBIN A1C Q6M  07/15/2019  Influenza Age 5 to Adult  08/01/2019  BREAST CANCER SCRN MAMMOGRAM  11/16/2019  MICROALBUMIN Q1  01/15/2020  LIPID PANEL Q1  01/15/2020  Pneumococcal 65+ years (2 of 2 - PPSV23) 01/15/2020  MEDICARE YEARLY EXAM  01/16/2020  GLAUCOMA SCREENING Q2Y  04/17/2020  
 EYE EXAM RETINAL OR DILATED  04/17/2020  
 DTaP/Tdap/Td series (2 - Td) 08/11/2020  COLONOSCOPY  12/21/2021  Hepatitis C Screening  Completed  Bone Densitometry (Dexa) Screening  Completed Objective: 
Visit Vitals /70 Pulse 69 Temp 97.5 °F (36.4 °C) (Oral) Resp 16 Ht 5' 1\" (1.549 m) Wt 144 lb (65.3 kg) SpO2 97% BMI 27.21 kg/m² Nurses notes and VS reviewed. Physical Examination: General appearance - alert, well appearing, and in no distress Chest - clear to auscultation, no wheezes, rales or rhonchi, symmetric air entry Heart - normal rate, regular rhythm, normal S1, S2, no murmurs, rubs, clicks or gallops Labwork and Ancillary Studies: CBC w/Diff Lab Results Component Value Date/Time WBC 11.7 01/15/2019 11:54 AM  
 HGB 12.8 01/15/2019 11:54 AM  
 PLATELET 611 55/39/6267 11:54 AM  
  
 
 Basic Metabolic Profile Lab Results Component Value Date/Time Sodium 140 01/15/2019 11:54 AM  
 Potassium 3.9 01/15/2019 11:54 AM  
 Chloride 100 01/15/2019 11:54 AM  
 CO2 31 01/15/2019 11:54 AM  
 Anion gap 9 01/15/2019 11:54 AM  
 Glucose 129 (H) 01/15/2019 11:54 AM  
 BUN 11 01/15/2019 11:54 AM  
 Creatinine 0.62 01/15/2019 11:54 AM  
 BUN/Creatinine ratio 18 01/15/2019 11:54 AM  
 GFR est AA >60 01/15/2019 11:54 AM  
 GFR est non-AA >60 01/15/2019 11:54 AM  
 Calcium 9.7 01/15/2019 11:54 AM  
  
  
LFT Lab Results Component Value Date/Time  ALT (SGPT) 20 01/15/2019 11:54 AM  
 AST (SGOT) 12 (L) 01/15/2019 11:54 AM  
 Alk. phosphatase 48 01/15/2019 11:54 AM  
 Bilirubin, direct 0.1 01/15/2019 11:54 AM  
 Bilirubin, total 0.5 01/15/2019 11:54 AM  
 
 
 
Cholesterol Lab Results Component Value Date/Time  Cholesterol, total 166 01/15/2019 11:54 AM  
 HDL Cholesterol 48 01/15/2019 11:54 AM  
 LDL, calculated 93 01/15/2019 11:54 AM  
 Triglyceride 125 01/15/2019 11:54 AM  
 CHOL/HDL Ratio 3.5 01/15/2019 11:54 AM

## 2019-05-02 RX ORDER — DOCUSATE SODIUM 100 MG/1
100 CAPSULE, LIQUID FILLED ORAL 2 TIMES DAILY
Qty: 180 CAP | Refills: 1 | Status: SHIPPED | OUTPATIENT
Start: 2019-05-02 | End: 2020-01-01 | Stop reason: SDUPTHER

## 2019-05-02 NOTE — TELEPHONE ENCOUNTER
Requested Prescriptions     Pending Prescriptions Disp Refills    docusate sodium (COLACE) 100 mg capsule 180 Cap 0     Sig: Take 1 Cap by mouth two (2) times a day. Patient said she took her last pill yesterday    Remaining pills:0  Last appt:04/09/19  Next appt:05/14/2019    Pharmacy:[facility] [place] [facility]      Patient aware of 72 hour time frame.

## 2019-05-14 ENCOUNTER — OFFICE VISIT (OUTPATIENT)
Dept: FAMILY MEDICINE CLINIC | Age: 69
End: 2019-05-14

## 2019-05-14 VITALS
WEIGHT: 138 LBS | HEART RATE: 67 BPM | DIASTOLIC BLOOD PRESSURE: 68 MMHG | SYSTOLIC BLOOD PRESSURE: 126 MMHG | OXYGEN SATURATION: 98 % | RESPIRATION RATE: 16 BRPM | BODY MASS INDEX: 26.06 KG/M2 | HEIGHT: 61 IN | TEMPERATURE: 98.7 F

## 2019-05-14 DIAGNOSIS — E11.42 TYPE 2 DIABETES MELLITUS WITH DIABETIC POLYNEUROPATHY, WITHOUT LONG-TERM CURRENT USE OF INSULIN (HCC): ICD-10-CM

## 2019-05-14 DIAGNOSIS — R42 VERTIGO: ICD-10-CM

## 2019-05-14 DIAGNOSIS — E78.00 PURE HYPERCHOLESTEROLEMIA: ICD-10-CM

## 2019-05-14 DIAGNOSIS — I10 ESSENTIAL HYPERTENSION: Primary | ICD-10-CM

## 2019-05-14 LAB — HBA1C MFR BLD HPLC: 7.5 %

## 2019-05-14 RX ORDER — MECLIZINE HYDROCHLORIDE 25 MG/1
25 TABLET ORAL
COMMUNITY
Start: 2017-05-31 | End: 2019-08-14 | Stop reason: SDUPTHER

## 2019-05-14 RX ORDER — OMEPRAZOLE 40 MG/1
40 CAPSULE, DELAYED RELEASE ORAL DAILY
COMMUNITY
Start: 2019-05-03 | End: 2020-01-01 | Stop reason: SDUPTHER

## 2019-05-14 NOTE — PATIENT INSTRUCTIONS

## 2019-05-14 NOTE — PROGRESS NOTES
Nancy Stanley is a 77 y.o. female (: 1950) presenting to address:    Chief Complaint   Patient presents with    Dizziness    Abdominal Pain     comes and goes    Diabetes       Vitals:    19 1307   BP: 126/68   Pulse: 67   Resp: 16   Temp: 98.7 °F (37.1 °C)   TempSrc: Oral   SpO2: 98%   Weight: 138 lb (62.6 kg)   Height: 5' 1\" (1.549 m)   PainSc:   5   PainLoc: Neck       Hearing/Vision:   No exam data present    Learning Assessment:     Learning Assessment 2016   PRIMARY LEARNER Patient   HIGHEST LEVEL OF EDUCATION - PRIMARY LEARNER  SOME COLLEGE   PRIMARY LANGUAGE ENGLISH   LEARNER PREFERENCE PRIMARY DEMONSTRATION   ANSWERED BY patient   RELATIONSHIP SELF     Depression Screening:     3 most recent PHQ Screens 1/15/2019   PHQ Not Done -   Little interest or pleasure in doing things Not at all   Feeling down, depressed, irritable, or hopeless Not at all   Total Score PHQ 2 0     Fall Risk Assessment:     Fall Risk Assessment, last 12 mths 2019   Able to walk? Yes   Fall in past 12 months? No   Fall with injury? -   Number of falls in past 12 months -   Fall Risk Score -     Abuse Screening:     Abuse Screening Questionnaire 1/15/2019   Do you ever feel afraid of your partner? N   Are you in a relationship with someone who physically or mentally threatens you? N   Is it safe for you to go home? Y     Coordination of Care Questionaire:   1. Have you been to the ER, urgent care clinic since your last visit? Hospitalized since your last visit? NO    2. Have you seen or consulted any other health care providers outside of the [address] [place] [address] since your last visit? Include any pap smears or colon screening. NO    Advanced Directive:   1. Do you have an Advanced Directive? NO    2. Would you like information on Advanced Directives?  NO

## 2019-05-14 NOTE — PROGRESS NOTES
Assessment/Plan:    *Diagnoses and all orders for this visit:    1. Essential hypertension    2. Type 2 diabetes mellitus with diabetic polyneuropathy, without long-term current use of insulin (HCC)  -     AMB POC HEMOGLOBIN A1C    3. Pure hypercholesterolemia    4. Vertigo        F/u 4 months. A1c in office. The plan was discussed with the patient. The patient verbalized understanding and is in agreement with the plan. All medication potential side effects were discussed with the patient.    -------------------------------------------------------------------------------------------------------------------        Carrie Tirado is a 77 y.o. female and presents with Dizziness; Abdominal Pain (comes and goes); and Diabetes         Subjective:  Pt here for routine 4 month f/u. DM:  A1c in office today is 7.5%. It is a little higher than previous. HTN: well controlled. HLD:  Stable on last BW.    Dizziness: was in the ER this past Sunday. Was given Meclizine. Is doing better and she has not even started the Rx yet. She still needs to have her mammogram.  Will switch to [facility] from [email or web address]. ROS:  Review of Systems - Negative         The problem list was updated as a part of today's visit. Patient Active Problem List   Diagnosis Code    Hypertension I10    Diabetes ([address] E11.9    GERD (gastroesophageal reflux disease) K21.9    Depression F32.9    HLD (hyperlipidemia) E78.5    Acquired hypothyroidism E03.9    Benign positional vertigo H81.10    Asthma J45.909    Sleep disturbance G47.9       The PSH, FH were reviewed. SH:  Social History     Tobacco Use    Smoking status: Never Smoker    Smokeless tobacco: Never Used   Substance Use Topics    Alcohol use: Yes    Drug use: No       Medications/Allergies:  Current Outpatient Medications on File Prior to Visit   Medication Sig Dispense Refill    omeprazole (PRILOSEC) 40 mg capsule Take 40 mg by mouth daily.       meclizine (ANTIVERT) 25 mg tablet Take 25 mg by mouth three (3) times daily as needed.  docusate sodium (COLACE) 100 mg capsule Take 1 Cap by mouth two (2) times a day. 180 Cap 1    gabapentin (NEURONTIN) 100 mg capsule Take 1 Cap by mouth three (3) times daily. 270 Cap 1    glipiZIDE (GLUCOTROL) 5 mg tablet Take 1 Tab by mouth two (2) times a day. 180 Tab 1    propranolol LA (INDERAL LA) 60 mg SR capsule Take 1 Cap by mouth daily. Indications: Migraine Prevention 90 Cap 1    lisinopril (PRINIVIL, ZESTRIL) 40 mg tablet Take 1 Tab by mouth daily. 90 Tab 1    potassium chloride (KLOR-CON) 10 mEq tablet Take 1 Tab by mouth daily. 90 Tab 0    fenofibrate nanocrystallized (TRICOR) 48 mg tablet Take 1 Tab by mouth daily. 90 Tab 0    levothyroxine (SYNTHROID) 50 mcg tablet Take 1 Tab by mouth Daily (before breakfast). 90 Tab 0    PARoxetine (PAXIL) 40 mg tablet Take 1 Tab by mouth daily. 30 Tab 0    chlorthalidone (HYGROTEN) 25 mg tablet Take 1 Tab by mouth daily. 90 Tab 1    SITagliptin (JANUVIA) 100 mg tablet Take 1 Tab by mouth daily. 90 Tab 1    fluticasone-salmeterol (ADVAIR) 250-50 mcg/dose diskus inhaler Take 1 Puff by inhalation every twelve (12) hours. 3 Inhaler 2    metFORMIN (GLUCOPHAGE) 1,000 mg tablet Take 1 Tab by mouth two (2) times daily (with meals). 180 Tab 1    cetirizine (ZYRTEC) 10 mg tablet Take 1 Tab by mouth daily. 90 Tab 1    atorvastatin (LIPITOR) 10 mg tablet Take 1 Tab by mouth daily. 90 Tab 1    albuterol (PROVENTIL HFA, VENTOLIN HFA, PROAIR HFA) 90 mcg/actuation inhaler Take 2 Puffs by inhalation every four (4) hours as needed for Wheezing. 2 Inhaler 2    polyethylene glycol (MIRALAX) 17 gram packet Take 1 Packet by mouth daily. 90 Packet 0    alendronate (FOSAMAX) 70 mg tablet Take 1 Tab by mouth every seven (7) days. 12 Tab 2    fluticasone (FLONASE) 50 mcg/actuation nasal spray 2 Sprays by Both Nostrils route daily. 1 Bottle 2    garlic 0,221 mg cap Take  by mouth.       willie, Zingiber officinalis, (WILLIE EXTRACT) 250 mg cap Take  by mouth.  Omega-3-DHA-EPA-Fish Oil 1,000 mg (120 mg-180 mg) cap Take  by mouth daily. No current facility-administered medications on file prior to visit. Allergies   Allergen Reactions    Codeine Nausea and Vomiting         Health Maintenance:   Health Maintenance   Topic Date Due    Shingrix Vaccine Age 49> (1 of 2) 04/05/2001    FOOT EXAM Q1  10/23/2018    HEMOGLOBIN A1C Q6M  07/15/2019    Influenza Age 5 to Adult  08/01/2019    BREAST CANCER SCRN MAMMOGRAM  11/16/2019    MICROALBUMIN Q1  01/15/2020    LIPID PANEL Q1  01/15/2020    Pneumococcal 65+ years (2 of 2 - PPSV23) 01/15/2020    MEDICARE YEARLY EXAM  01/16/2020    GLAUCOMA SCREENING Q2Y  04/17/2020    EYE EXAM RETINAL OR DILATED  04/17/2020    DTaP/Tdap/Td series (2 - Td) 08/11/2020    COLONOSCOPY  12/21/2021    Hepatitis C Screening  Completed    Bone Densitometry (Dexa) Screening  Completed       Objective:  Visit Vitals  /68 (BP 1 Location: Left arm, BP Patient Position: Sitting)   Pulse 67   Temp 98.7 °F (37.1 °C) (Oral)   Resp 16   Ht 5' 1\" (1.549 m)   Wt 138 lb (62.6 kg)   SpO2 98%   BMI 26.07 kg/m²          Nurses notes and VS reviewed.       Physical Examination: General appearance - alert, well appearing, and in no distress  Chest - clear to auscultation, no wheezes, rales or rhonchi, symmetric air entry  Heart - normal rate, regular rhythm, normal S1, S2, no murmurs, rubs, clicks or gallops  Abdomen - soft, nontender, nondistended, no masses or organomegaly          Labwork and Ancillary Studies:    CBC w/Diff  Lab Results   Component Value Date/Time    WBC 11.7 01/15/2019 11:54 AM    HGB 12.8 01/15/2019 11:54 AM    PLATELET 596 81/38/2297 11:54 AM         Basic Metabolic Profile  Lab Results   Component Value Date/Time    Sodium 140 01/15/2019 11:54 AM    Potassium 3.9 01/15/2019 11:54 AM    Chloride 100 01/15/2019 11:54 AM    CO2 31 01/15/2019 11:54 AM    Anion gap 9 01/15/2019 11:54 AM    Glucose 129 (H) 01/15/2019 11:54 AM    BUN 11 01/15/2019 11:54 AM    Creatinine 0.62 01/15/2019 11:54 AM    BUN/Creatinine ratio 18 01/15/2019 11:54 AM    GFR est AA >60 01/15/2019 11:54 AM    GFR est non-AA >60 01/15/2019 11:54 AM    Calcium 9.7 01/15/2019 11:54 AM         LFT  Lab Results   Component Value Date/Time    ALT (SGPT) 20 01/15/2019 11:54 AM    AST (SGOT) 12 (L) 01/15/2019 11:54 AM    Alk.  phosphatase 48 01/15/2019 11:54 AM    Bilirubin, direct 0.1 01/15/2019 11:54 AM    Bilirubin, total 0.5 01/15/2019 11:54 AM         Cholesterol  Lab Results   Component Value Date/Time    Cholesterol, total 166 01/15/2019 11:54 AM    HDL Cholesterol 48 01/15/2019 11:54 AM    LDL, calculated 93 01/15/2019 11:54 AM    Triglyceride 125 01/15/2019 11:54 AM    CHOL/HDL Ratio 3.5 01/15/2019 11:54 AM

## 2019-06-17 DIAGNOSIS — E03.9 ACQUIRED HYPOTHYROIDISM: ICD-10-CM

## 2019-06-17 NOTE — TELEPHONE ENCOUNTER
Requested Prescriptions     Pending Prescriptions Disp Refills    levothyroxine (SYNTHROID) 50 mcg tablet 90 Tab 0     Sig: Take 1 Tab by mouth Daily (before breakfast). Remaining pills:3  Last appt:05/14/19  Next appt:N/A    Pharmacy:[facility] pharmacy      Patient aware of 72 hour time frame.

## 2019-06-21 ENCOUNTER — TELEPHONE (OUTPATIENT)
Dept: FAMILY MEDICINE CLINIC | Age: 69
End: 2019-06-21

## 2019-06-21 RX ORDER — LEVOTHYROXINE SODIUM 50 UG/1
50 TABLET ORAL
Qty: 90 TAB | Refills: 1 | Status: SHIPPED | OUTPATIENT
Start: 2019-06-21 | End: 2019-07-06 | Stop reason: SDUPTHER

## 2019-06-21 NOTE — TELEPHONE ENCOUNTER
Attempted to call patient to schedule appointment for September. The phone just rings & rings no answering machine or voice mail.

## 2019-07-03 NOTE — TELEPHONE ENCOUNTER
Requested Prescriptions     Pending Prescriptions Disp Refills    potassium chloride (KLOR-CON) 10 mEq tablet 90 Tab 0     Sig: Take 1 Tab by mouth daily. Remaining pills:0  Last appt:05/14/19  Next appt:N/A    Pharmacy:[facility]      Patient aware of 72 hour time frame.

## 2019-07-06 ENCOUNTER — TELEPHONE (OUTPATIENT)
Dept: FAMILY MEDICINE CLINIC | Age: 69
End: 2019-07-06

## 2019-07-06 DIAGNOSIS — E78.00 PURE HYPERCHOLESTEROLEMIA: ICD-10-CM

## 2019-07-06 DIAGNOSIS — E03.9 ACQUIRED HYPOTHYROIDISM: ICD-10-CM

## 2019-07-06 RX ORDER — ATORVASTATIN CALCIUM 10 MG/1
10 TABLET, FILM COATED ORAL DAILY
Qty: 90 TAB | Refills: 1 | Status: SHIPPED | OUTPATIENT
Start: 2019-07-06 | End: 2020-01-01 | Stop reason: SDUPTHER

## 2019-07-06 RX ORDER — POTASSIUM CHLORIDE 750 MG/1
10 TABLET, EXTENDED RELEASE ORAL DAILY
Qty: 30 TAB | Refills: 0 | Status: SHIPPED | OUTPATIENT
Start: 2019-07-06 | End: 2019-08-05

## 2019-07-06 RX ORDER — LEVOTHYROXINE SODIUM 50 UG/1
50 TABLET ORAL
Qty: 90 TAB | Refills: 1 | Status: SHIPPED | OUTPATIENT
Start: 2019-07-06 | End: 2019-01-01 | Stop reason: SDUPTHER

## 2019-07-06 RX ORDER — FENOFIBRATE 48 MG/1
48 TABLET, COATED ORAL DAILY
Qty: 30 TAB | Refills: 0 | Status: SHIPPED | OUTPATIENT
Start: 2019-07-06 | End: 2019-08-05

## 2019-07-09 RX ORDER — POTASSIUM CHLORIDE 750 MG/1
10 TABLET, EXTENDED RELEASE ORAL DAILY
Qty: 90 TAB | Refills: 0 | Status: SHIPPED | OUTPATIENT
Start: 2019-07-09 | End: 2019-11-06 | Stop reason: SDUPTHER

## 2019-07-09 RX ORDER — FENOFIBRATE 48 MG/1
48 TABLET, COATED ORAL DAILY
Qty: 90 TAB | Refills: 0 | Status: SHIPPED | OUTPATIENT
Start: 2019-07-09 | End: 2020-01-01 | Stop reason: SDUPTHER

## 2019-07-09 RX ORDER — CETIRIZINE HCL 10 MG
10 TABLET ORAL DAILY
Qty: 90 TAB | Refills: 0 | Status: SHIPPED | OUTPATIENT
Start: 2019-07-09 | End: 2020-01-01 | Stop reason: ALTCHOICE

## 2019-07-16 ENCOUNTER — TELEPHONE (OUTPATIENT)
Dept: FAMILY MEDICINE CLINIC | Age: 69
End: 2019-07-16

## 2019-07-17 DIAGNOSIS — K64.8 OTHER HEMORRHOIDS: Primary | ICD-10-CM

## 2019-07-17 RX ORDER — HYDROCORTISONE 25 MG/G
CREAM TOPICAL 4 TIMES DAILY
Qty: 30 G | Refills: 0 | Status: SHIPPED | OUTPATIENT
Start: 2019-07-17 | End: 2019-09-12 | Stop reason: SDUPTHER

## 2019-07-19 DIAGNOSIS — E11.42 TYPE 2 DIABETES MELLITUS WITH DIABETIC POLYNEUROPATHY, WITHOUT LONG-TERM CURRENT USE OF INSULIN (HCC): ICD-10-CM

## 2019-07-19 DIAGNOSIS — R51.9 PERSISTENT HEADACHES: ICD-10-CM

## 2019-07-19 DIAGNOSIS — I10 ESSENTIAL HYPERTENSION: ICD-10-CM

## 2019-07-19 RX ORDER — LISINOPRIL 40 MG/1
40 TABLET ORAL DAILY
Qty: 90 TAB | Refills: 1 | Status: CANCELLED | OUTPATIENT
Start: 2019-07-19

## 2019-07-19 RX ORDER — PROPRANOLOL HYDROCHLORIDE 60 MG/1
60 CAPSULE, EXTENDED RELEASE ORAL DAILY
Qty: 90 CAP | Refills: 1 | Status: CANCELLED | OUTPATIENT
Start: 2019-07-19

## 2019-07-19 RX ORDER — GLIPIZIDE 5 MG/1
5 TABLET ORAL 2 TIMES DAILY
Qty: 180 TAB | Refills: 1 | Status: CANCELLED | OUTPATIENT
Start: 2019-07-19

## 2019-07-19 RX ORDER — CETIRIZINE HCL 10 MG
10 TABLET ORAL DAILY
Qty: 90 TAB | Refills: 0 | Status: CANCELLED | OUTPATIENT
Start: 2019-07-19

## 2019-07-19 NOTE — TELEPHONE ENCOUNTER
Patient calling to request refill on:Advair      Remaining pills:  Last appt:Tuesday, May 14, 2019  Next appt:Thursday, September 12, 2019     [facility]      Patient aware of 72 hour time frame. Patient calling to request refill on: Zyrtec      Remaining pills:0  Last appt:Tuesday, May 14, 2019  Next appt:Thursday, September 12, 2019     [facility]      Patient aware of 72 hour time frame. Patient calling to request refill on:inderal      Remaining pills:0  Last appt:Tuesday, May 14, 2019  Next appt:Thursday, September 12, 2019     [facility]      Patient aware of 72 hour time frame. Patient calling to request refill on:lisinopril      Remaining pills:0  Last appt:Tuesday, May 14, 2019  Next appt:Thursday, September 12, 2019     [facility]      Patient aware of 72 hour time frame. Patient calling to request refill on: Glucotrol      Remaining pills:0  Last appt: Tuesday, May 14, 2019  Next appt: Thursday, September 12, 2019     Pharmacy: [address] [place] [address]      Patient aware of 72 hour time frame. Requested Prescriptions     Pending Prescriptions Disp Refills    fluticasone propion-salmeterol (ADVAIR/WIXELA) 250-50 mcg/dose diskus inhaler 3 Inhaler 2     Sig: Take 1 Puff by inhalation every twelve (12) hours.  cetirizine (ZYRTEC) 10 mg tablet 90 Tab 0     Sig: Take 1 Tab by mouth daily.  propranolol LA (INDERAL LA) 60 mg SR capsule 90 Cap 1     Sig: Take 1 Cap by mouth daily. Indications: Migraine Prevention    lisinopril (PRINIVIL, ZESTRIL) 40 mg tablet 90 Tab 1     Sig: Take 1 Tab by mouth daily.  glipiZIDE (GLUCOTROL) 5 mg tablet 180 Tab 1     Sig: Take 1 Tab by mouth two (2) times a day.

## 2019-07-22 RX ORDER — FLUTICASONE PROPIONATE AND SALMETEROL 250; 50 UG/1; UG/1
1 POWDER RESPIRATORY (INHALATION) EVERY 12 HOURS
Qty: 3 INHALER | Refills: 2 | Status: SHIPPED | OUTPATIENT
Start: 2019-07-22 | End: 2020-01-01 | Stop reason: SDUPTHER

## 2019-07-22 NOTE — TELEPHONE ENCOUNTER
Glipizide last ordered 04/09/19 with a quantity of 180 and 1 refill. Does not need a refill at this time - removing from pended orders. Lisinopril last ordered 04/09/19 with a quantity of 90 and 1 refill. Does not need a refill at this time - removing from pended orders. Propanolol last ordered 04/09/19 with a quantity of 90 and 1 refill. Does not need a refill at this time - removing from pended orders. Cetirizine last ordered 07/09/19 with a quantity of 90 and 0 refills. Does not need a refill at this time - removing from pended orders. Advair last ordered 01/15/19 with a quantity of 3 inhalers and 2 refills.          Last appt:Tuesday, May 14, 2019  Next appt:Thursday, September 12, 2019

## 2019-08-14 RX ORDER — MECLIZINE HYDROCHLORIDE 25 MG/1
25 TABLET ORAL
Qty: 60 TAB | Refills: 1 | Status: SHIPPED | OUTPATIENT
Start: 2019-08-14 | End: 2019-09-12 | Stop reason: SDUPTHER

## 2019-08-14 NOTE — TELEPHONE ENCOUNTER
Requested Prescriptions     Pending Prescriptions Disp Refills    meclizine (ANTIVERT) 25 mg tablet       Sig: Take 1 Tab by mouth three (3) times daily as needed. Patient calling to request refill on: 8.14.19      Remaining pills: 3   Last appt: 5.14.19  Next appt: 9.12.19    Pharmacy: [facility] [address]       Patient aware of 72 hour time frame. Pt requesting a call when medication has been sent in.

## 2019-09-12 ENCOUNTER — OFFICE VISIT (OUTPATIENT)
Dept: FAMILY MEDICINE CLINIC | Age: 69
End: 2019-09-12

## 2019-09-12 VITALS
DIASTOLIC BLOOD PRESSURE: 59 MMHG | SYSTOLIC BLOOD PRESSURE: 121 MMHG | WEIGHT: 137 LBS | HEART RATE: 68 BPM | RESPIRATION RATE: 16 BRPM | BODY MASS INDEX: 25.86 KG/M2 | TEMPERATURE: 97.9 F | OXYGEN SATURATION: 95 % | HEIGHT: 61 IN

## 2019-09-12 DIAGNOSIS — E11.42 TYPE 2 DIABETES MELLITUS WITH DIABETIC POLYNEUROPATHY, WITHOUT LONG-TERM CURRENT USE OF INSULIN (HCC): ICD-10-CM

## 2019-09-12 DIAGNOSIS — E03.9 ACQUIRED HYPOTHYROIDISM: ICD-10-CM

## 2019-09-12 DIAGNOSIS — Z23 ENCOUNTER FOR IMMUNIZATION: ICD-10-CM

## 2019-09-12 DIAGNOSIS — R10.30 LOWER ABDOMINAL PAIN: ICD-10-CM

## 2019-09-12 DIAGNOSIS — K59.09 OTHER CONSTIPATION: ICD-10-CM

## 2019-09-12 DIAGNOSIS — K64.8 OTHER HEMORRHOIDS: ICD-10-CM

## 2019-09-12 DIAGNOSIS — F32.89 OTHER DEPRESSION: ICD-10-CM

## 2019-09-12 DIAGNOSIS — E55.9 HYPOVITAMINOSIS D: ICD-10-CM

## 2019-09-12 DIAGNOSIS — E78.00 PURE HYPERCHOLESTEROLEMIA: ICD-10-CM

## 2019-09-12 DIAGNOSIS — I10 ESSENTIAL HYPERTENSION: Primary | ICD-10-CM

## 2019-09-12 LAB — HBA1C MFR BLD HPLC: 8.8 %

## 2019-09-12 RX ORDER — FLUTICASONE PROPIONATE 50 MCG
2 SPRAY, SUSPENSION (ML) NASAL DAILY
Qty: 1 BOTTLE | Refills: 2 | Status: SHIPPED | OUTPATIENT
Start: 2019-09-12 | End: 2020-01-01 | Stop reason: ALTCHOICE

## 2019-09-12 RX ORDER — CHLORTHALIDONE 25 MG/1
25 TABLET ORAL DAILY
Qty: 90 TAB | Refills: 2 | Status: SHIPPED | OUTPATIENT
Start: 2019-09-12 | End: 2020-01-01 | Stop reason: SDUPTHER

## 2019-09-12 RX ORDER — HYDROCORTISONE 25 MG/G
CREAM TOPICAL 4 TIMES DAILY
Qty: 30 G | Refills: 0 | Status: SHIPPED | OUTPATIENT
Start: 2019-09-12 | End: 2019-12-11 | Stop reason: SDUPTHER

## 2019-09-12 RX ORDER — PAROXETINE HYDROCHLORIDE 40 MG/1
40 TABLET, FILM COATED ORAL DAILY
Qty: 90 TAB | Refills: 2 | Status: SHIPPED | OUTPATIENT
Start: 2019-09-12 | End: 2020-01-01 | Stop reason: SDUPTHER

## 2019-09-12 RX ORDER — ALENDRONATE SODIUM 70 MG/1
70 TABLET ORAL
Qty: 12 TAB | Refills: 2 | Status: SHIPPED | OUTPATIENT
Start: 2019-09-12

## 2019-09-12 RX ORDER — ALBUTEROL SULFATE 90 UG/1
2 AEROSOL, METERED RESPIRATORY (INHALATION)
Qty: 2 INHALER | Refills: 2 | Status: SHIPPED | OUTPATIENT
Start: 2019-09-12 | End: 2020-01-01 | Stop reason: SDUPTHER

## 2019-09-12 RX ORDER — POLYETHYLENE GLYCOL 3350 17 G/17G
17 POWDER, FOR SOLUTION ORAL DAILY
Qty: 90 PACKET | Refills: 2 | Status: SHIPPED | OUTPATIENT
Start: 2019-09-12 | End: 2019-12-11 | Stop reason: SDUPTHER

## 2019-09-12 RX ORDER — MECLIZINE HYDROCHLORIDE 25 MG/1
25 TABLET ORAL
Qty: 60 TAB | Refills: 1 | Status: SHIPPED | OUTPATIENT
Start: 2019-09-12 | End: 2020-01-01 | Stop reason: SDUPTHER

## 2019-09-12 NOTE — PROGRESS NOTES
Radha Corcoran is a 77 y.o. female (: 1950) presenting to address:    Chief Complaint   Patient presents with    Diabetes    Gum Problem     dry mouth        Vitals:    19 1315   BP: 121/59   Pulse: 68   Resp: 16   Temp: 97.9 °F (36.6 °C)   TempSrc: Oral   SpO2: 95%   Weight: 137 lb (62.1 kg)   Height: 5' 1\" (1.549 m)   PainSc:   0 - No pain       Hearing/Vision:   No exam data present    Learning Assessment:     Learning Assessment 2016   PRIMARY LEARNER Patient   HIGHEST LEVEL OF EDUCATION - PRIMARY LEARNER  SOME COLLEGE   PRIMARY LANGUAGE ENGLISH   LEARNER PREFERENCE PRIMARY DEMONSTRATION   ANSWERED BY patient   RELATIONSHIP SELF     Depression Screening:     3 most recent PHQ Screens 1/15/2019   PHQ Not Done -   Little interest or pleasure in doing things Not at all   Feeling down, depressed, irritable, or hopeless Not at all   Total Score PHQ 2 0     Fall Risk Assessment:     Fall Risk Assessment, last 12 mths 2019   Able to walk? Yes   Fall in past 12 months? No   Fall with injury? -   Number of falls in past 12 months -   Fall Risk Score -     Abuse Screening:     Abuse Screening Questionnaire 1/15/2019   Do you ever feel afraid of your partner? N   Are you in a relationship with someone who physically or mentally threatens you? N   Is it safe for you to go home? Y     Coordination of Care Questionaire:   1. Have you been to the ER, urgent care clinic since your last visit? Hospitalized since your last visit? NO    2. Have you seen or consulted any other health care providers outside of the [address] [place] [address] since your last visit? Include any pap smears or colon screening. NO    Advanced Directive:   1. Do you have an Advanced Directive? NO    2. Would you like information on Advanced Directives? NO    Flu shot Immunization/s administered 2019 by Erick Mcclendon LPN with guardian's consent. Patient tolerated procedure well. No reactions noted.

## 2019-09-12 NOTE — PROGRESS NOTES
Assessment/Plan:    *Diagnoses and all orders for this visit:    1. Essential hypertension  -     chlorthalidone (HYGROTEN) 25 mg tablet; Take 1 Tab by mouth daily. 2. Type 2 diabetes mellitus with diabetic polyneuropathy, without long-term current use of insulin (HCC)  -     AMB POC HEMOGLOBIN A1C  -     HEMOGLOBIN A1C W/O EAG; Future  -     MICROALBUMIN, UR, RAND W/ MICROALB/CREAT RATIO; Future  -     CBC WITH AUTOMATED DIFF; Future  -     HEPATIC FUNCTION PANEL; Future  -     LIPID PANEL; Future  -     METABOLIC PANEL, BASIC; Future  -     TSH 3RD GENERATION; Future  -     T4, FREE; Future  -     URINALYSIS W/ RFLX MICROSCOPIC; Future  -     HM DIABETES FOOT EXAM    3. Acquired hypothyroidism    4. Pure hypercholesterolemia    5. Other hemorrhoids  -     hydrocortisone (ANUSOL-HC) 2.5 % rectal cream; Insert  into rectum four (4) times daily. -     phenylephrine 0.25 % suppository; Insert 1 Suppository into rectum two (2) times a day. 6. Other depression  -     PARoxetine (PAXIL) 40 mg tablet; Take 1 Tab by mouth daily. 7. Lower abdominal pain  -     polyethylene glycol (MIRALAX) 17 gram packet; Take 1 Packet by mouth daily. 8. Other constipation  -     polyethylene glycol (MIRALAX) 17 gram packet; Take 1 Packet by mouth daily. 9. Hypovitaminosis D  -     VITAMIN D, 25 HYDROXY; Future    10. Encounter for immunization  -     INFLUENZA VACCINE INACTIVATED (IIV), SUBUNIT, ADJUVANTED, IM  -     ADMIN INFLUENZA VIRUS VAC    Other orders  -     alendronate (FOSAMAX) 70 mg tablet; Take 1 Tab by mouth every seven (7) days. -     albuterol (PROVENTIL HFA, VENTOLIN HFA, PROAIR HFA) 90 mcg/actuation inhaler; Take 2 Puffs by inhalation every four (4) hours as needed for Wheezing.  -     fluticasone propionate (FLONASE) 50 mcg/actuation nasal spray; 2 Sprays by Both Nostrils route daily. -     meclizine (ANTIVERT) 25 mg tablet; Take 1 Tab by mouth three (3) times daily as needed for Dizziness.  Indications: sensation of spinning or whirling        Physical in Jan 2020. BW. The plan was discussed with the patient. The patient verbalized understanding and is in agreement with the plan. All medication potential side effects were discussed with the patient.    -------------------------------------------------------------------------------------------------------------------        Marie Alegre is a 77 y.o. female and presents with Diabetes and Gum Problem (dry mouth )         Subjective:  Pt here for f/u. Has not been following her diet. DM:  Has not been following her diet and her A1c is 8.8%. HTN;  Well controlled. HLD: stable. Still has occasional issue with hemorrhoids. ROS:  Review of Systems - Negative         The problem list was updated as a part of today's visit. Patient Active Problem List   Diagnosis Code    Hypertension I10    Diabetes ([address] E11.9    GERD (gastroesophageal reflux disease) K21.9    Depression F32.9    HLD (hyperlipidemia) E78.5    Acquired hypothyroidism E03.9    Benign positional vertigo H81.10    Asthma J45.909    Sleep disturbance G47.9       The PSH, FH were reviewed. SH:  Social History     Tobacco Use    Smoking status: Never Smoker    Smokeless tobacco: Never Used   Substance Use Topics    Alcohol use: Yes    Drug use: No       Medications/Allergies:  Current Outpatient Medications on File Prior to Visit   Medication Sig Dispense Refill    fluticasone propion-salmeterol (ADVAIR/WIXELA) 250-50 mcg/dose diskus inhaler Take 1 Puff by inhalation every twelve (12) hours. 3 Inhaler 2    potassium chloride (KLOR-CON) 10 mEq tablet Take 1 Tab by mouth daily. 90 Tab 0    fenofibrate nanocrystallized (TRICOR) 48 mg tablet Take 1 Tab by mouth daily. 90 Tab 0    cetirizine (ZYRTEC) 10 mg tablet Take 1 Tab by mouth daily. 90 Tab 0    atorvastatin (LIPITOR) 10 mg tablet Take 1 Tab by mouth daily.  90 Tab 1    levothyroxine (SYNTHROID) 50 mcg tablet Take 1 Tab by mouth Daily (before breakfast). 90 Tab 1    omeprazole (PRILOSEC) 40 mg capsule Take 40 mg by mouth daily.  docusate sodium (COLACE) 100 mg capsule Take 1 Cap by mouth two (2) times a day. 180 Cap 1    gabapentin (NEURONTIN) 100 mg capsule Take 1 Cap by mouth three (3) times daily. 270 Cap 1    glipiZIDE (GLUCOTROL) 5 mg tablet Take 1 Tab by mouth two (2) times a day. 180 Tab 1    propranolol LA (INDERAL LA) 60 mg SR capsule Take 1 Cap by mouth daily. Indications: Migraine Prevention 90 Cap 1    lisinopril (PRINIVIL, ZESTRIL) 40 mg tablet Take 1 Tab by mouth daily. 90 Tab 1    SITagliptin (JANUVIA) 100 mg tablet Take 1 Tab by mouth daily. 90 Tab 1    metFORMIN (GLUCOPHAGE) 1,000 mg tablet Take 1 Tab by mouth two (2) times daily (with meals). 868 Tab 1    garlic 9,153 mg cap Take  by mouth.  willie, Zingiber officinalis, (WILILE EXTRACT) 250 mg cap Take  by mouth.  Omega-3-DHA-EPA-Fish Oil 1,000 mg (120 mg-180 mg) cap Take  by mouth daily. No current facility-administered medications on file prior to visit.          Allergies   Allergen Reactions    Codeine Nausea and Vomiting         Health Maintenance:   Health Maintenance   Topic Date Due    Shingrix Vaccine Age 49> (1 of 2) 04/05/2001    FOOT EXAM Q1  10/23/2018    Influenza Age 5 to Adult  08/01/2019    BREAST CANCER SCRN MAMMOGRAM  11/16/2019    HEMOGLOBIN A1C Q6M  11/14/2019    MICROALBUMIN Q1  01/15/2020    LIPID PANEL Q1  01/15/2020    Pneumococcal 65+ years (2 of 2 - PPSV23) 01/15/2020    MEDICARE YEARLY EXAM  01/16/2020    GLAUCOMA SCREENING Q2Y  04/17/2020    EYE EXAM RETINAL OR DILATED  04/17/2020    DTaP/Tdap/Td series (2 - Td) 08/11/2020    COLONOSCOPY  12/21/2021    Hepatitis C Screening  Completed    Bone Densitometry (Dexa) Screening  Completed       Objective:  Visit Vitals  /59   Pulse 68   Temp 97.9 °F (36.6 °C) (Oral)   Resp 16   Ht 5' 1\" (1.549 m)   Wt 137 lb (62.1 kg) SpO2 95%   BMI 25.89 kg/m²          Nurses notes and VS reviewed. Physical Examination: General appearance - alert, well appearing, and in no distress  Chest - clear to auscultation, no wheezes, rales or rhonchi, symmetric air entry  Heart - normal rate, regular rhythm, normal S1, S2, no murmurs, rubs, clicks or gallops  Extremities - peripheral pulses normal, no pedal edema, no clubbing or cyanosis  Diabetic foot exam:     Left:    Filament test normal sensation with micro filament   Pulse DP: 2+ (normal)   Pulse PT: 2+ (normal)   Deformities: None  Right:    Filament test normal sensation with micro filament   Pulse DP: 2+ (normal)   Pulse PT: 2+ (normal)   Deformities: None            Labwork and Ancillary Studies:    CBC w/Diff  Lab Results   Component Value Date/Time    WBC 11.7 01/15/2019 11:54 AM    HGB 12.8 01/15/2019 11:54 AM    PLATELET 376 95/13/6738 11:54 AM         Basic Metabolic Profile  Lab Results   Component Value Date/Time    Sodium 140 01/15/2019 11:54 AM    Potassium 3.9 01/15/2019 11:54 AM    Chloride 100 01/15/2019 11:54 AM    CO2 31 01/15/2019 11:54 AM    Anion gap 9 01/15/2019 11:54 AM    Glucose 129 (H) 01/15/2019 11:54 AM    BUN 11 01/15/2019 11:54 AM    Creatinine 0.62 01/15/2019 11:54 AM    BUN/Creatinine ratio 18 01/15/2019 11:54 AM    GFR est AA >60 01/15/2019 11:54 AM    GFR est non-AA >60 01/15/2019 11:54 AM    Calcium 9.7 01/15/2019 11:54 AM         LFT  Lab Results   Component Value Date/Time    ALT (SGPT) 20 01/15/2019 11:54 AM    AST (SGOT) 12 (L) 01/15/2019 11:54 AM    Alk.  phosphatase 48 01/15/2019 11:54 AM    Bilirubin, direct 0.1 01/15/2019 11:54 AM    Bilirubin, total 0.5 01/15/2019 11:54 AM         Cholesterol  Lab Results   Component Value Date/Time    Cholesterol, total 166 01/15/2019 11:54 AM    HDL Cholesterol 48 01/15/2019 11:54 AM    LDL, calculated 93 01/15/2019 11:54 AM    Triglyceride 125 01/15/2019 11:54 AM    CHOL/HDL Ratio 3.5 01/15/2019 11:54 AM

## 2019-09-12 NOTE — PATIENT INSTRUCTIONS
Counting Carbohydrates: Care Instructions  Your Care Instructions    You don't have to eat special foods when you have diabetes. You just have to be careful to eat healthy foods. Carbohydrates (carbs) raise blood sugar higher and quicker than any other nutrient. Carbs are found in desserts, breads and cereals, and fruit. They're also in starchy vegetables. These include potatoes, corn, and grains such as rice and pasta. Carbs are also in milk and yogurt. The more carbs you eat at one time, the higher your blood sugar will rise. Spreading carbs all through the day helps keep your blood sugar levels within your target range. Counting carbs is one of the best ways to keep your blood sugar under control. If you use insulin, counting carbs helps you match the right amount of insulin to the number of grams of carbs in a meal. Then you can change your diet and insulin dose as needed. Testing your blood sugar several times a day can help you learn how carbs affect your blood sugar. A registered dietitian or certified diabetes educator can help you plan meals and snacks. Follow-up care is a key part of your treatment and safety. Be sure to make and go to all appointments, and call your doctor if you are having problems. It's also a good idea to know your test results and keep a list of the medicines you take. How can you care for yourself at home? Know your daily amount of carbohydrates  Your daily amount depends on several things, such as your weight, how active you are, which diabetes medicines you take, and what your goals are for your blood sugar levels. A registered dietitian or certified diabetes educator can help you plan how many carbs to include in each meal and snack. For most adults, a guideline for the daily amount of carbs is:  · 45 to 60 grams at each meal. That's about the same as 3 to 4 carbohydrate servings. · 15 to 20 grams at each snack. That's about the same as 1 carbohydrate serving.   Count carbs  Counting carbs lets you know how much rapid-acting insulin to take before you eat. If you use an insulin pump, you get a constant rate of insulin during the day. So the pump must be programmed at meals. This gives you extra insulin to cover the rise in blood sugar after meals. If you take insulin:  · Learn your own insulin-to-carb ratio. You and your diabetes health professional will figure out the ratio. You can do this by testing your blood sugar after meals. For example, you may need a certain amount of insulin for every 15 grams of carbs. · Add up the carb grams in a meal. Then you can figure out how many units of insulin to take based on your insulin-to-carb ratio. · Exercise lowers blood sugar. You can use less insulin than you would if you were not doing exercise. Keep in mind that timing matters. If you exercise within 1 hour after a meal, your body may need less insulin for that meal than it would if you exercised 3 hours after the meal. Test your blood sugar to find out how exercise affects your need for insulin. If you do or don't take insulin:  · Look at labels on packaged foods. This can tell you how many carbs are in a serving. You can also use guides from the American Diabetes Association. · Be aware of portions, or serving sizes. If a package has two servings and you eat the whole package, you need to double the number of grams of carbohydrate listed for one serving. · Protein, fat, and fiber do not raise blood sugar as much as carbs do. If you eat a lot of these nutrients in a meal, your blood sugar will rise more slowly than it would otherwise. Eat from all food groups  · Eat at least three meals a day. · Plan meals to include food from all the food groups. The food groups include grains, fruits, dairy, proteins, and vegetables. · Talk to your dietitian or diabetes educator about ways to add limited amounts of sweets into your meal plan. · If you drink alcohol, talk to your doctor. It may not be recommended when you are taking certain diabetes medicines. Where can you learn more? Go to http://bertha-shayla.info/. Enter H131 in the search box to learn more about \"Counting Carbohydrates: Care Instructions. \"  Current as of: July 25, 2018  Content Version: 12.1  © 5387-6839 [employer]. Care instructions adapted under license by [employer] (which disclaims liability or warranty for this information). If you have questions about a medical condition or this instruction, always ask your healthcare professional. [address] any warranty or liability for your use of this information.

## 2019-09-20 NOTE — TELEPHONE ENCOUNTER
Requested Prescriptions     Pending Prescriptions Disp Refills    SITagliptin (JANUVIA) 100 mg tablet 90 Tab 1     Sig: Take 1 Tab by mouth daily. Patient calling to request refill on:      Remaining pills:  Last appt:  Next appt: Future Appointments   Date Time Provider Davion Waters   1/16/2020  1:00 PM Sean Villasenor MD [address] [place] [address],[address] Floor:    Neema Johnsoning  Patient aware of 72 hour time frame.

## 2019-11-06 NOTE — TELEPHONE ENCOUNTER
Patient request medication refill. .. Patient is on her last dose    Requested Prescriptions     Pending Prescriptions Disp Refills    potassium chloride (KLOR-CON) 10 mEq tablet 90 Tab 0     Sig: Take 1 Tab by mouth daily.

## 2019-11-07 RX ORDER — POTASSIUM CHLORIDE 750 MG/1
10 TABLET, EXTENDED RELEASE ORAL DAILY
Qty: 90 TAB | Refills: 0 | Status: SHIPPED | OUTPATIENT
Start: 2019-11-07 | End: 2020-01-01 | Stop reason: SDUPTHER

## 2019-11-22 DIAGNOSIS — F32.89 OTHER DEPRESSION: ICD-10-CM

## 2019-11-22 RX ORDER — PAROXETINE HYDROCHLORIDE 40 MG/1
40 TABLET, FILM COATED ORAL DAILY
Qty: 90 TAB | Refills: 2 | Status: CANCELLED | OUTPATIENT
Start: 2019-11-22

## 2019-11-22 NOTE — TELEPHONE ENCOUNTER
Patient called requesting a refill on his medication. Requested Prescriptions     Pending Prescriptions Disp Refills    PARoxetine (PAXIL) 40 mg tablet 90 Tab 2     Sig: Take 1 Tab by mouth daily. Last seen: September 12, 2019  Upcoming appt: January 16, 2020    Patient aware of 72 hour time frame.

## 2019-11-25 NOTE — TELEPHONE ENCOUNTER
Paxil last ordered 09/12/19 qty 90 with 2 refills. Refill not needed at this time. Patient notified.

## 2019-12-11 ENCOUNTER — OFFICE VISIT (OUTPATIENT)
Dept: FAMILY MEDICINE CLINIC | Age: 69
End: 2019-12-11

## 2019-12-11 VITALS
SYSTOLIC BLOOD PRESSURE: 130 MMHG | HEIGHT: 61 IN | OXYGEN SATURATION: 96 % | DIASTOLIC BLOOD PRESSURE: 61 MMHG | BODY MASS INDEX: 26.06 KG/M2 | WEIGHT: 138 LBS | TEMPERATURE: 97.9 F | RESPIRATION RATE: 16 BRPM | HEART RATE: 68 BPM
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
## 2019-12-11 DIAGNOSIS — R10.30 LOWER ABDOMINAL PAIN: ICD-10-CM

## 2019-12-11 DIAGNOSIS — K64.8 OTHER HEMORRHOIDS: ICD-10-CM
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
## 2019-12-11 DIAGNOSIS — K59.09 OTHER CONSTIPATION: ICD-10-CM

## 2019-12-11 DIAGNOSIS — S09.90XA TRAUMATIC INJURY OF HEAD, INITIAL ENCOUNTER: Primary | ICD-10-CM

## 2019-12-11 RX ORDER — HYDROCORTISONE 25 MG/G
CREAM TOPICAL 4 TIMES DAILY
Qty: 30 G | Refills: 2 | Status: SHIPPED | OUTPATIENT
Start: 2019-12-11 | End: 2020-01-01 | Stop reason: SDUPTHER

## 2019-12-11 RX ORDER — POLYETHYLENE GLYCOL 3350 17 G/17G
17 POWDER, FOR SOLUTION ORAL DAILY
Qty: 90 PACKET | Refills: 2 | Status: SHIPPED | OUTPATIENT
Start: 2019-12-11

## 2019-12-11 NOTE — PROGRESS NOTES
Carolina Torres is a 77 y.o. female (: 1950) presenting to address: Chief Complaint Patient presents with  Fall  
  2 days ago  Dizziness Vitals:  
 19 1347 BP: 130/61 Pulse: 68 Resp: 16 Temp: 97.9 °F (36.6 °C) TempSrc: Oral  
SpO2: 96% Weight: 138 lb (62.6 kg) Height: 5' 1\" (1.549 m) PainSc:   7 PainLoc: Head Hearing/Vision: No exam data present Learning Assessment:  
 
Learning Assessment 2016 PRIMARY LEARNER Patient HIGHEST LEVEL OF EDUCATION - PRIMARY LEARNER  S[employer]LE[employer] PRIMARY LANGUAGE ENGLISH  
LEARNER PREFERENCE PRIMARY DEMONSTRATION  
ANSWERED BY patient RELATIONSHIP SELF Depression Screening:  
 
3 most recent PHQ Screens 1/15/2019 PHQ Not Done - Little interest or pleasure in doing things Not at all Feeling down, depressed, irritable, or hopeless Not at all Total Score PHQ 2 0 Fall Risk Assessment:  
 
Fall Risk Assessment, last 12 mths 2019 Able to walk? Yes Fall in past 12 months? No  
Fall with injury? -  
Number of falls in past 12 months - Fall Risk Score -  
 
Abuse Screening:  
 
Abuse Screening Questionnaire 1/15/2019 Do you ever feel afraid of your partner? Yan Peppers Are you in a relationship with someone who physically or mentally threatens you? Yan Peppers Is it safe for you to go home? Joleen Sevilla Coordination of Care Questionaire: 1. Have you been to the ER, urgent care clinic since your last visit? Hospitalized since your last visit? NO 
 
2. Have you seen or consulted any other health care providers outside of the [address] [place] [address] since your last visit? Include any pap smears or colon screening. NO Advanced Directive: 1. Do you have an Advanced Directive? NO 
 
2. Would you like information on Advanced Directives?  NO

## 2019-12-11 NOTE — PROGRESS NOTES
Assessment/Plan: *Diagnoses and all orders for this visit: 1. Traumatic injury of head, initial encounter -     CT HEAD W WO CONT; Future 2. Other hemorrhoids 
-     hydrocortisone (ANUSOL-HC) 2.5 % rectal cream; Insert  into rectum four (4) times daily. -     phenylephrine 0.25 % suppository; Insert 1 Suppository into rectum two (2) times a day. 3. Lower abdominal pain -     polyethylene glycol (MIRALAX) 17 gram packet; Take 1 Packet by mouth daily. 4. Other constipation -     polyethylene glycol (MIRALAX) 17 gram packet; Take 1 Packet by mouth daily. 5. Fall in home, initial encounter Will get a CT head to r/o any intracranial abnormality. May have sustained a subdural hematoma. The plan was discussed with the patient. The patient verbalized understanding and is in agreement with the plan. All medication potential side effects were discussed with the patient. 
 
------------------------------------------------------------------------------------------------------------------- Shreya Weber is a 77 y.o. female and presents with Fall (2 days ago ) and Dizziness Subjective: 
About 2 days ago, pt had a fall at home. She had a little dizziness, was trying to put on her slipper, she says she tripped on the slipper and fell forward, hitting forehead on the laminate tiling in bedroom. She did not lose consciousness, her  helped her up. She decided not to go to the ER as she felt fine. She iced her forehead and has been monitoring things. She does not feel terrible, some mild lightheadedness, but because it was a head trauma, she thought that she should get herself checked out. Review of Systems - ENT ROS: negative Respiratory ROS: no cough, shortness of breath, or wheezing Cardiovascular ROS: no chest pain or dyspnea on exertion Musculoskeletal ROS: negative Neurological ROS: positive for - dizziness and headaches The problem list was updated as a part of today's visit. Patient Active Problem List  
Diagnosis Code  Hypertension I10  
 Diabetes ([facility] [address] E11.9  GERD (gastroesophageal reflux disease) K21.9  Depression F32.9  
 HLD (hyperlipidemia) E78.5  Acquired hypothyroidism E03.9  Benign positional vertigo H81.10  Asthma J45.909  Sleep disturbance G47.9 The PSH, FH were reviewed. SH: Social History Tobacco Use  Smoking status: Never Smoker  Smokeless tobacco: Never Used Substance Use Topics  Alcohol use: Yes  Drug use: No  
 
 
Medications/Allergies: 
Current Outpatient Medications on File Prior to Visit Medication Sig Dispense Refill  potassium chloride (KLOR-CON) 10 mEq tablet Take 1 Tab by mouth daily. 90 Tab 0  
 SITagliptin (JANUVIA) 100 mg tablet Take 1 Tab by mouth daily. 90 Tab 1  
 alendronate (FOSAMAX) 70 mg tablet Take 1 Tab by mouth every seven (7) days. 12 Tab 2  
 albuterol (PROVENTIL HFA, VENTOLIN HFA, PROAIR HFA) 90 mcg/actuation inhaler Take 2 Puffs by inhalation every four (4) hours as needed for Wheezing. 2 Inhaler 2  chlorthalidone (HYGROTEN) 25 mg tablet Take 1 Tab by mouth daily. 90 Tab 2  
 fluticasone propionate (FLONASE) 50 mcg/actuation nasal spray 2 Sprays by Both Nostrils route daily. 1 Bottle 2  
 meclizine (ANTIVERT) 25 mg tablet Take 1 Tab by mouth three (3) times daily as needed for Dizziness. Indications: sensation of spinning or whirling 60 Tab 1  
 PARoxetine (PAXIL) 40 mg tablet Take 1 Tab by mouth daily. 90 Tab 2  
 fluticasone propion-salmeterol (ADVAIR/WIXELA) 250-50 mcg/dose diskus inhaler Take 1 Puff by inhalation every twelve (12) hours. 3 Inhaler 2  
 fenofibrate nanocrystallized (TRICOR) 48 mg tablet Take 1 Tab by mouth daily. 90 Tab 0  
 cetirizine (ZYRTEC) 10 mg tablet Take 1 Tab by mouth daily. 90 Tab 0  
 atorvastatin (LIPITOR) 10 mg tablet Take 1 Tab by mouth daily.  90 Tab 1  
  levothyroxine (SYNTHROID) 50 mcg tablet Take 1 Tab by mouth Daily (before breakfast). 90 Tab 1  
 omeprazole (PRILOSEC) 40 mg capsule Take 40 mg by mouth daily.  docusate sodium (COLACE) 100 mg capsule Take 1 Cap by mouth two (2) times a day. 180 Cap 1  
 gabapentin (NEURONTIN) 100 mg capsule Take 1 Cap by mouth three (3) times daily. 270 Cap 1  
 glipiZIDE (GLUCOTROL) 5 mg tablet Take 1 Tab by mouth two (2) times a day. 180 Tab 1  propranolol LA (INDERAL LA) 60 mg SR capsule Take 1 Cap by mouth daily. Indications: Migraine Prevention 90 Cap 1  
 lisinopril (PRINIVIL, ZESTRIL) 40 mg tablet Take 1 Tab by mouth daily. 90 Tab 1  
 metFORMIN (GLUCOPHAGE) 1,000 mg tablet Take 1 Tab by mouth two (2) times daily (with meals). 220 Tab 1  
 garlic 8,596 mg cap Take  by mouth.  willie, Zingiber officinalis, (WILLIE EXTRACT) 250 mg cap Take  by mouth.  Omega-3-DHA-EPA-Fish Oil 1,000 mg (120 mg-180 mg) cap Take  by mouth daily.  [DISCONTINUED] hydrocortisone (ANUSOL-HC) 2.5 % rectal cream Insert  into rectum four (4) times daily. 30 g 0  
 [DISCONTINUED] phenylephrine 0.25 % suppository Insert 1 Suppository into rectum two (2) times a day. 30 Suppository 0  
 [DISCONTINUED] polyethylene glycol (MIRALAX) 17 gram packet Take 1 Packet by mouth daily. 90 Packet 2 No current facility-administered medications on file prior to visit. Allergies Allergen Reactions  Codeine Nausea and Vomiting Health Maintenance:  
Health Maintenance Topic Date Due  Shingrix Vaccine Age 50> (1 of 2) 04/05/2001  BREAST CANCER SCRN MAMMOGRAM  11/16/2019  MICROALBUMIN Q1  01/15/2020  LIPID PANEL Q1  01/15/2020  Pneumococcal 65+ years (2 of 2 - PPSV23) 01/15/2020  MEDICARE YEARLY EXAM  01/16/2020  
 HEMOGLOBIN A1C Q6M  03/12/2020  GLAUCOMA SCREENING Q2Y  04/17/2020  
 EYE EXAM RETINAL OR DILATED  04/17/2020  
 DTaP/Tdap/Td series (2 - Td) 08/11/2020  FOOT EXAM Q1  09/12/2020  COLONOSCOPY  12/21/2021  Hepatitis C Screening  Completed  Bone Densitometry (Dexa) Screening  Completed  Influenza Age 5 to Adult  Completed Objective: 
Visit Vitals /61 Pulse 68 Temp 97.9 °F (36.6 °C) (Oral) Resp 16 Ht 5' 1\" (1.549 m) Wt 138 lb (62.6 kg) SpO2 96% BMI 26.07 kg/m² Nurses notes and VS reviewed. Physical Examination: General appearance - alert, well appearing, and in no distress Neurological - alert, oriented, normal speech, no focal findings or movement disorder noted, neck supple without rigidity, motor and sensory grossly normal bilaterally, Romberg sign negative, normal gait and station Musculoskeletal - no joint tenderness, deformity or swelling Labwork and Ancillary Studies: CBC w/Diff Lab Results Component Value Date/Time WBC 11.7 01/15/2019 11:54 AM  
 HGB 12.8 01/15/2019 11:54 AM  
 PLATELET 543 16/46/4462 11:54 AM  
  
 
 Basic Metabolic Profile Lab Results Component Value Date/Time Sodium 140 01/15/2019 11:54 AM  
 Potassium 3.9 01/15/2019 11:54 AM  
 Chloride 100 01/15/2019 11:54 AM  
 CO2 31 01/15/2019 11:54 AM  
 Anion gap 9 01/15/2019 11:54 AM  
 Glucose 129 (H) 01/15/2019 11:54 AM  
 BUN 11 01/15/2019 11:54 AM  
 Creatinine 0.62 01/15/2019 11:54 AM  
 BUN/Creatinine ratio 18 01/15/2019 11:54 AM  
 GFR est AA >60 01/15/2019 11:54 AM  
 GFR est non-AA >60 01/15/2019 11:54 AM  
 Calcium 9.7 01/15/2019 11:54 AM  
  
  
LFT Lab Results Component Value Date/Time ALT (SGPT) 20 01/15/2019 11:54 AM  
 AST (SGOT) 12 (L) 01/15/2019 11:54 AM  
 Alk. phosphatase 48 01/15/2019 11:54 AM  
 Bilirubin, direct 0.1 01/15/2019 11:54 AM  
 Bilirubin, total 0.5 01/15/2019 11:54 AM  
 
 
 
Cholesterol Lab Results Component Value Date/Time  Cholesterol, total 166 01/15/2019 11:54 AM  
 HDL Cholesterol 48 01/15/2019 11:54 AM  
 LDL, calculated 93 01/15/2019 11:54 AM  
 Triglyceride 125 01/15/2019 11:54 AM  
 CHOL/HDL Ratio 3.5 01/15/2019 11:54 AM

## 2019-12-11 NOTE — PATIENT INSTRUCTIONS
Learning About a Closed Head Injury What is a closed head injury? A closed head injury happens when your head gets hit hard. The strong force of the blow causes your brain to shake in your skull. This movement can cause the brain to bruise, swell, or tear. Sometimes nerves or blood vessels also get damaged. This can cause bleeding in or around the brain. A concussion is a type of closed head injury. What are the symptoms? If you have a mild concussion, you may have a mild headache or feel \"not quite right. \" These symptoms are common. They usually go away over a few days to 4 weeks. But sometimes after a concussion, you feel like you can't function as well as before the injury. And you have new symptoms. This is called postconcussive syndrome. You may: · Find it harder to solve problems, think, concentrate, or remember. · Have headaches. · Have changes in your sleep patterns, such as not being able to sleep or sleeping all the time. · Have changes in your personality. · Not be interested in your usual activities. · Feel angry or anxious without a clear reason. · Lose your sense of taste or smell. · Be dizzy, lightheaded, or unsteady. It may be hard to stand or walk. How is a closed head injury treated? Any person who may have a concussion needs to see a doctor. Some people have to stay in the hospital to be watched. Others can go home safely. If you go home, follow your doctor's instructions. He or she will tell you if you need someone to watch you closely for the next 24 hours or longer. Rest is the best treatment. Get plenty of sleep at night. And try to rest during the day. · Avoid activities that are physically or mentally demanding. These include housework, exercise, and schoolwork. And don't play video games, send text messages, or use the computer. You may need to change your school or work schedule to be able to avoid these activities. · Ask your doctor when it's okay to drive, ride a bike, or operate machinery. · Take an over-the-counter pain medicine, such as acetaminophen (Tylenol), ibuprofen (Advil, Motrin), or naproxen (Aleve). Be safe with medicines. Read and follow all instructions on the label. · Check with your doctor before you use any other medicines for pain. · Do not drink alcohol or use illegal drugs. They can slow recovery. They can also increase your risk of getting a second head injury. Follow-up care is a key part of your treatment and safety. Be sure to make and go to all appointments, and call your doctor if you are having problems. It's also a good idea to know your test results and keep a list of the medicines you take. Where can you learn more? Go to http://bertha-shayla.info/. Enter E235 in the search box to learn more about \"Learning About a Closed Head Injury. \" Current as of: March 28, 2019 Content Version: 12.2 © 3707-7542 [employer]. Care instructions adapted under license by [employer] (which disclaims liability or warranty for this information). If you have questions about a medical condition or this instruction, always ask your healthcare professional. [address] any warranty or liability for your use of this information.

## 2019-12-27 NOTE — TELEPHONE ENCOUNTER
Requested Prescriptions     Pending Prescriptions Disp Refills    SITagliptin (JANUVIA) 100 mg tablet 90 Tab 1     Sig: Take 1 Tab by mouth daily.  levothyroxine (SYNTHROID) 50 mcg tablet 90 Tab 1     Sig: Take 1 Tab by mouth Daily (before breakfast).      Glucotrol 5 mg

## 2019-12-30 NOTE — TELEPHONE ENCOUNTER
Synthroid last ordered 07/06/19 qty 90 with 1 refill. Januvia last ordered 09/22/19 qty 90 with 1 refill.      Last appt 12/11/19  Next scheduled 01/16/20

## 2019-12-30 NOTE — TELEPHONE ENCOUNTER
Please notify pt that her head CT was fine. There is no sign of bleeding, no injury to the brain. It was compared with a prior imaging from May and there were no changes. Nothing further is needed now.

## 2019-12-31 NOTE — TELEPHONE ENCOUNTER
Attempted to call the patient - the home number listed just rings continuously, no voicemail, and the mobile number listed (her ) states that the wireless customer is unavailable. Will try again.

## 2020-01-01 ENCOUNTER — TELEPHONE (OUTPATIENT)
Dept: FAMILY MEDICINE CLINIC | Age: 70
End: 2020-01-01

## 2020-01-01 ENCOUNTER — OFFICE VISIT (OUTPATIENT)
Dept: FAMILY MEDICINE CLINIC | Age: 70
End: 2020-01-01

## 2020-01-01 ENCOUNTER — HOSPITAL ENCOUNTER (OUTPATIENT)
Dept: LAB | Age: 70
Discharge: HOME | End: 2020-03-04
Payer: MEDICARE

## 2020-01-01 ENCOUNTER — OFFICE VISIT (OUTPATIENT)
Dept: FAMILY MEDICINE CLINIC | Age: 70
End: 2020-01-01
Payer: MEDICARE

## 2020-01-01 ENCOUNTER — HOSPITAL ENCOUNTER (OUTPATIENT)
Dept: LAB | Age: 70
Discharge: HOME | End: 2020-01-16
Payer: MEDICARE

## 2020-01-01 ENCOUNTER — HOSPITAL ENCOUNTER (OUTPATIENT)
Dept: LAB | Age: 70
Discharge: HOME | End: 2020-06-11
Payer: MEDICARE

## 2020-01-01 ENCOUNTER — VIRTUAL VISIT (OUTPATIENT)
Dept: FAMILY MEDICINE CLINIC | Age: 70
End: 2020-01-01

## 2020-01-01 VITALS
SYSTOLIC BLOOD PRESSURE: 131 MMHG | TEMPERATURE: 97 F | HEART RATE: 68 BPM | BODY MASS INDEX: 25.86 KG/M2 | DIASTOLIC BLOOD PRESSURE: 63 MMHG | HEIGHT: 61 IN | RESPIRATION RATE: 16 BRPM | OXYGEN SATURATION: 96 % | WEIGHT: 137 LBS

## 2020-01-01 VITALS
BODY MASS INDEX: 26.43 KG/M2 | SYSTOLIC BLOOD PRESSURE: 182 MMHG | OXYGEN SATURATION: 98 % | HEIGHT: 61 IN | RESPIRATION RATE: 16 BRPM | TEMPERATURE: 97.5 F | WEIGHT: 140 LBS | HEART RATE: 70 BPM | DIASTOLIC BLOOD PRESSURE: 74 MMHG

## 2020-01-01 VITALS
RESPIRATION RATE: 16 BRPM | SYSTOLIC BLOOD PRESSURE: 147 MMHG | HEART RATE: 100 BPM | TEMPERATURE: 97.3 F | DIASTOLIC BLOOD PRESSURE: 70 MMHG | BODY MASS INDEX: 25.86 KG/M2 | OXYGEN SATURATION: 98 % | HEIGHT: 61 IN | WEIGHT: 137 LBS

## 2020-01-01 VITALS
SYSTOLIC BLOOD PRESSURE: 160 MMHG | WEIGHT: 144 LBS | RESPIRATION RATE: 18 BRPM | DIASTOLIC BLOOD PRESSURE: 83 MMHG | BODY MASS INDEX: 27.19 KG/M2 | OXYGEN SATURATION: 98 % | HEIGHT: 61 IN | HEART RATE: 73 BPM | TEMPERATURE: 98.6 F

## 2020-01-01 VITALS
WEIGHT: 134 LBS | HEIGHT: 61 IN | HEART RATE: 70 BPM | DIASTOLIC BLOOD PRESSURE: 75 MMHG | TEMPERATURE: 97.3 F | RESPIRATION RATE: 16 BRPM | OXYGEN SATURATION: 97 % | SYSTOLIC BLOOD PRESSURE: 119 MMHG | BODY MASS INDEX: 25.3 KG/M2

## 2020-01-01 VITALS
RESPIRATION RATE: 16 BRPM | OXYGEN SATURATION: 97 % | DIASTOLIC BLOOD PRESSURE: 68 MMHG | HEART RATE: 72 BPM | BODY MASS INDEX: 25.3 KG/M2 | WEIGHT: 134 LBS | HEIGHT: 61 IN | TEMPERATURE: 97.5 F | SYSTOLIC BLOOD PRESSURE: 122 MMHG

## 2020-01-01 DIAGNOSIS — I10 ESSENTIAL HYPERTENSION: ICD-10-CM

## 2020-01-01 DIAGNOSIS — R51.9 PERSISTENT HEADACHES: ICD-10-CM

## 2020-01-01 DIAGNOSIS — E03.9 ACQUIRED HYPOTHYROIDISM: ICD-10-CM

## 2020-01-01 DIAGNOSIS — R06.02 SOB (SHORTNESS OF BREATH): Primary | ICD-10-CM

## 2020-01-01 DIAGNOSIS — Z23 NEEDS FLU SHOT: ICD-10-CM

## 2020-01-01 DIAGNOSIS — R60.0 BILATERAL LOWER EXTREMITY EDEMA: Primary | ICD-10-CM

## 2020-01-01 DIAGNOSIS — K21.00 GASTROESOPHAGEAL REFLUX DISEASE WITH ESOPHAGITIS: Primary | ICD-10-CM

## 2020-01-01 DIAGNOSIS — R68.89 OTHER GENERAL SYMPTOMS AND SIGNS: ICD-10-CM

## 2020-01-01 DIAGNOSIS — R07.81 RIB PAIN ON LEFT SIDE: ICD-10-CM

## 2020-01-01 DIAGNOSIS — K64.9 HEMORRHOIDS, UNSPECIFIED HEMORRHOID TYPE: Primary | ICD-10-CM

## 2020-01-01 DIAGNOSIS — F32.89 OTHER DEPRESSION: ICD-10-CM

## 2020-01-01 DIAGNOSIS — E78.00 PURE HYPERCHOLESTEROLEMIA: ICD-10-CM

## 2020-01-01 DIAGNOSIS — K86.2 PANCREATIC CYST: ICD-10-CM

## 2020-01-01 DIAGNOSIS — R41.3 MEMORY DIFFICULTIES: Primary | ICD-10-CM

## 2020-01-01 DIAGNOSIS — R42 DIZZINESS: Primary | ICD-10-CM

## 2020-01-01 DIAGNOSIS — I51.7 CARDIOMEGALY: ICD-10-CM

## 2020-01-01 DIAGNOSIS — R11.0 CHRONIC NAUSEA: Primary | ICD-10-CM

## 2020-01-01 DIAGNOSIS — Z12.39 BREAST CANCER SCREENING: ICD-10-CM

## 2020-01-01 DIAGNOSIS — E78.00 PURE HYPERCHOLESTEROLEMIA: Primary | ICD-10-CM

## 2020-01-01 DIAGNOSIS — E87.6 HYPOKALEMIA: ICD-10-CM

## 2020-01-01 DIAGNOSIS — K21.00 GASTROESOPHAGEAL REFLUX DISEASE WITH ESOPHAGITIS: ICD-10-CM

## 2020-01-01 DIAGNOSIS — Z00.00 MEDICARE ANNUAL WELLNESS VISIT, SUBSEQUENT: ICD-10-CM

## 2020-01-01 DIAGNOSIS — R06.02 SOB (SHORTNESS OF BREATH): ICD-10-CM

## 2020-01-01 DIAGNOSIS — R60.0 BILATERAL LEG EDEMA: ICD-10-CM

## 2020-01-01 DIAGNOSIS — E11.42 TYPE 2 DIABETES MELLITUS WITH DIABETIC POLYNEUROPATHY, WITHOUT LONG-TERM CURRENT USE OF INSULIN (HCC): ICD-10-CM

## 2020-01-01 DIAGNOSIS — E11.40 TYPE 2 DIABETES MELLITUS WITH DIABETIC NEUROPATHY, WITHOUT LONG-TERM CURRENT USE OF INSULIN (HCC): ICD-10-CM

## 2020-01-01 DIAGNOSIS — S09.90XA TRAUMATIC INJURY OF HEAD, INITIAL ENCOUNTER: ICD-10-CM

## 2020-01-01 DIAGNOSIS — R11.0 CHRONIC NAUSEA: ICD-10-CM

## 2020-01-01 DIAGNOSIS — R07.89 OTHER CHEST PAIN: ICD-10-CM

## 2020-01-01 DIAGNOSIS — R44.3 HALLUCINATIONS: ICD-10-CM

## 2020-01-01 DIAGNOSIS — J45.40 MODERATE PERSISTENT ASTHMA WITHOUT COMPLICATION: ICD-10-CM

## 2020-01-01 DIAGNOSIS — R53.83 FATIGUE, UNSPECIFIED TYPE: ICD-10-CM

## 2020-01-01 DIAGNOSIS — R10.84 GENERALIZED ABDOMINAL PAIN: ICD-10-CM

## 2020-01-01 DIAGNOSIS — E55.9 HYPOVITAMINOSIS D: ICD-10-CM

## 2020-01-01 DIAGNOSIS — R13.10 DYSPHAGIA, UNSPECIFIED TYPE: ICD-10-CM

## 2020-01-01 LAB
25(OH)D3 SERPL-MCNC: 23.5 NG/ML (ref 30–100)
ALBUMIN SERPL-MCNC: 4 G/DL (ref 3.4–5)
ALBUMIN/GLOB SERPL: 1.1 {RATIO} (ref 0.8–1.7)
ALP SERPL-CCNC: 58 U/L (ref 45–117)
ALT SERPL-CCNC: 19 U/L (ref 13–56)
ANION GAP SERPL CALC-SCNC: 5 MMOL/L (ref 3–18)
ANION GAP SERPL CALC-SCNC: 8 MMOL/L (ref 3–18)
APPEARANCE UR: CLEAR
AST SERPL-CCNC: 12 U/L (ref 10–38)
BACTERIA URNS QL MICRO: ABNORMAL /HPF
BASOPHILS # BLD: 0 K/UL (ref 0–0.1)
BASOPHILS # BLD: 0.1 K/UL (ref 0–0.1)
BASOPHILS NFR BLD: 0 % (ref 0–2)
BASOPHILS NFR BLD: 1 % (ref 0–2)
BILIRUB DIRECT SERPL-MCNC: 0.2 MG/DL (ref 0–0.2)
BILIRUB SERPL-MCNC: 0.6 MG/DL (ref 0.2–1)
BILIRUB UR QL: NEGATIVE
BUN SERPL-MCNC: 13 MG/DL (ref 7–18)
BUN SERPL-MCNC: 9 MG/DL (ref 7–18)
BUN/CREAT SERPL: 14 (ref 12–20)
BUN/CREAT SERPL: 17 (ref 12–20)
CALCIUM SERPL-MCNC: 8.6 MG/DL (ref 8.5–10.1)
CALCIUM SERPL-MCNC: 9.9 MG/DL (ref 8.5–10.1)
CHLORIDE SERPL-SCNC: 107 MMOL/L (ref 100–111)
CHLORIDE SERPL-SCNC: 96 MMOL/L (ref 100–111)
CHOLEST SERPL-MCNC: 150 MG/DL
CO2 SERPL-SCNC: 28 MMOL/L (ref 21–32)
CO2 SERPL-SCNC: 30 MMOL/L (ref 21–32)
COLOR UR: ABNORMAL
CREAT SERPL-MCNC: 0.66 MG/DL (ref 0.6–1.3)
CREAT SERPL-MCNC: 0.75 MG/DL (ref 0.6–1.3)
CREAT UR-MCNC: 193 MG/DL (ref 30–125)
DIFFERENTIAL METHOD BLD: ABNORMAL
DIFFERENTIAL METHOD BLD: ABNORMAL
EOSINOPHIL # BLD: 0.3 K/UL (ref 0–0.4)
EOSINOPHIL # BLD: 0.3 K/UL (ref 0–0.4)
EOSINOPHIL NFR BLD: 3 % (ref 0–5)
EOSINOPHIL NFR BLD: 4 % (ref 0–5)
EPITH CASTS URNS QL MICRO: ABNORMAL /LPF (ref 0–5)
ERYTHROCYTE [DISTWIDTH] IN BLOOD BY AUTOMATED COUNT: 12.8 % (ref 11.6–14.5)
ERYTHROCYTE [DISTWIDTH] IN BLOOD BY AUTOMATED COUNT: 13 % (ref 11.6–14.5)
GLOBULIN SER CALC-MCNC: 3.6 G/DL (ref 2–4)
GLUCOSE SERPL-MCNC: 182 MG/DL (ref 74–99)
GLUCOSE SERPL-MCNC: 274 MG/DL (ref 74–99)
GLUCOSE UR STRIP.AUTO-MCNC: 500 MG/DL
HBA1C MFR BLD HPLC: 8.9 %
HBA1C MFR BLD: 7.8 % (ref 4.2–5.6)
HCT VFR BLD AUTO: 36.9 % (ref 35–45)
HCT VFR BLD AUTO: 39.3 % (ref 35–45)
HDLC SERPL-MCNC: 39 MG/DL (ref 40–60)
HDLC SERPL: 3.8 {RATIO} (ref 0–5)
HGB BLD-MCNC: 12.4 G/DL (ref 12–16)
HGB BLD-MCNC: 13.4 G/DL (ref 12–16)
HGB UR QL STRIP: NEGATIVE
KETONES UR QL STRIP.AUTO: NEGATIVE MG/DL
LDLC SERPL CALC-MCNC: 82.4 MG/DL (ref 0–100)
LEUKOCYTE ESTERASE UR QL STRIP.AUTO: ABNORMAL
LIPID PROFILE,FLP: ABNORMAL
LYMPHOCYTES # BLD: 2.3 K/UL (ref 0.9–3.6)
LYMPHOCYTES # BLD: 2.3 K/UL (ref 0.9–3.6)
LYMPHOCYTES NFR BLD: 23 % (ref 21–52)
LYMPHOCYTES NFR BLD: 25 % (ref 21–52)
MCH RBC QN AUTO: 29.2 PG (ref 24–34)
MCH RBC QN AUTO: 29.2 PG (ref 24–34)
MCHC RBC AUTO-ENTMCNC: 33.6 G/DL (ref 31–37)
MCHC RBC AUTO-ENTMCNC: 34.1 G/DL (ref 31–37)
MCV RBC AUTO: 85.6 FL (ref 74–97)
MCV RBC AUTO: 86.8 FL (ref 74–97)
MICROALBUMIN UR-MCNC: 4.01 MG/DL (ref 0–3)
MICROALBUMIN/CREAT UR-RTO: 21 MG/G (ref 0–30)
MONOCYTES # BLD: 0.5 K/UL (ref 0.05–1.2)
MONOCYTES # BLD: 0.5 K/UL (ref 0.05–1.2)
MONOCYTES NFR BLD: 6 % (ref 3–10)
MONOCYTES NFR BLD: 6 % (ref 3–10)
NEUTS SEG # BLD: 5.8 K/UL (ref 1.8–8)
NEUTS SEG # BLD: 6.7 K/UL (ref 1.8–8)
NEUTS SEG NFR BLD: 65 % (ref 40–73)
NEUTS SEG NFR BLD: 67 % (ref 40–73)
NITRITE UR QL STRIP.AUTO: NEGATIVE
PH UR STRIP: 6.5 [PH] (ref 5–8)
PLATELET # BLD AUTO: 178 K/UL (ref 135–420)
PLATELET # BLD AUTO: 183 K/UL (ref 135–420)
PMV BLD AUTO: 12 FL (ref 9.2–11.8)
PMV BLD AUTO: 12.5 FL (ref 9.2–11.8)
POTASSIUM SERPL-SCNC: 3.4 MMOL/L (ref 3.5–5.5)
POTASSIUM SERPL-SCNC: 3.7 MMOL/L (ref 3.5–5.5)
PROT SERPL-MCNC: 7.6 G/DL (ref 6.4–8.2)
PROT UR STRIP-MCNC: NEGATIVE MG/DL
RBC # BLD AUTO: 4.25 M/UL (ref 4.2–5.3)
RBC # BLD AUTO: 4.59 M/UL (ref 4.2–5.3)
RBC #/AREA URNS HPF: ABNORMAL /HPF (ref 0–5)
SODIUM SERPL-SCNC: 134 MMOL/L (ref 136–145)
SODIUM SERPL-SCNC: 140 MMOL/L (ref 136–145)
SP GR UR REFRACTOMETRY: 1.03 (ref 1–1.03)
T4 FREE SERPL-MCNC: 1.3 NG/DL (ref 0.7–1.5)
T4 FREE SERPL-MCNC: 1.7 NG/DL (ref 0.7–1.5)
TRIGL SERPL-MCNC: 143 MG/DL (ref ?–150)
TSH SERPL DL<=0.05 MIU/L-ACNC: 0.52 UIU/ML (ref 0.36–3.74)
TSH SERPL DL<=0.05 MIU/L-ACNC: 1.47 UIU/ML (ref 0.36–3.74)
UROBILINOGEN UR QL STRIP.AUTO: 1 EU/DL (ref 0.2–1)
VIT B12 SERPL-MCNC: 556 PG/ML (ref 211–911)
VLDLC SERPL CALC-MCNC: 28.6 MG/DL
WBC # BLD AUTO: 9 K/UL (ref 4.6–13.2)
WBC # BLD AUTO: 9.8 K/UL (ref 4.6–13.2)
WBC URNS QL MICRO: ABNORMAL /HPF (ref 0–4)

## 2020-01-01 PROCEDURE — 99214 OFFICE O/P EST MOD 30 MIN: CPT | Performed by: INTERNAL MEDICINE

## 2020-01-01 PROCEDURE — 82306 VITAMIN D 25 HYDROXY: CPT

## 2020-01-01 PROCEDURE — G8427 DOCREV CUR MEDS BY ELIG CLIN: HCPCS | Performed by: INTERNAL MEDICINE

## 2020-01-01 PROCEDURE — 83036 HEMOGLOBIN GLYCOSYLATED A1C: CPT

## 2020-01-01 PROCEDURE — 85025 COMPLETE CBC W/AUTO DIFF WBC: CPT

## 2020-01-01 PROCEDURE — G8536 NO DOC ELDER MAL SCRN: HCPCS | Performed by: INTERNAL MEDICINE

## 2020-01-01 PROCEDURE — 36415 COLL VENOUS BLD VENIPUNCTURE: CPT

## 2020-01-01 PROCEDURE — 82607 VITAMIN B-12: CPT

## 2020-01-01 PROCEDURE — 84439 ASSAY OF FREE THYROXINE: CPT

## 2020-01-01 PROCEDURE — G9899 SCRN MAM PERF RSLTS DOC: HCPCS | Performed by: INTERNAL MEDICINE

## 2020-01-01 PROCEDURE — 84443 ASSAY THYROID STIM HORMONE: CPT

## 2020-01-01 PROCEDURE — G8399 PT W/DXA RESULTS DOCUMENT: HCPCS | Performed by: INTERNAL MEDICINE

## 2020-01-01 PROCEDURE — G8754 DIAS BP LESS 90: HCPCS | Performed by: INTERNAL MEDICINE

## 2020-01-01 PROCEDURE — G8753 SYS BP > OR = 140: HCPCS | Performed by: INTERNAL MEDICINE

## 2020-01-01 PROCEDURE — G0463 HOSPITAL OUTPT CLINIC VISIT: HCPCS | Performed by: INTERNAL MEDICINE

## 2020-01-01 PROCEDURE — 80076 HEPATIC FUNCTION PANEL: CPT

## 2020-01-01 PROCEDURE — 80061 LIPID PANEL: CPT

## 2020-01-01 PROCEDURE — 1090F PRES/ABSN URINE INCON ASSESS: CPT | Performed by: INTERNAL MEDICINE

## 2020-01-01 PROCEDURE — 80048 BASIC METABOLIC PNL TOTAL CA: CPT

## 2020-01-01 PROCEDURE — 1100F PTFALLS ASSESS-DOCD GE2>/YR: CPT | Performed by: INTERNAL MEDICINE

## 2020-01-01 PROCEDURE — G8419 CALC BMI OUT NRM PARAM NOF/U: HCPCS | Performed by: INTERNAL MEDICINE

## 2020-01-01 PROCEDURE — G9717 DOC PT DX DEP/BP F/U NT REQ: HCPCS | Performed by: INTERNAL MEDICINE

## 2020-01-01 PROCEDURE — 90694 VACC AIIV4 NO PRSRV 0.5ML IM: CPT

## 2020-01-01 PROCEDURE — 81001 URINALYSIS AUTO W/SCOPE: CPT

## 2020-01-01 PROCEDURE — 3017F COLORECTAL CA SCREEN DOC REV: CPT | Performed by: INTERNAL MEDICINE

## 2020-01-01 PROCEDURE — 3288F FALL RISK ASSESSMENT DOCD: CPT | Performed by: INTERNAL MEDICINE

## 2020-01-01 PROCEDURE — 82043 UR ALBUMIN QUANTITATIVE: CPT

## 2020-01-01 RX ORDER — PROMETHAZINE HYDROCHLORIDE 25 MG/1
25 TABLET ORAL
Qty: 90 TAB | Refills: 1 | Status: SHIPPED | OUTPATIENT
Start: 2020-01-01 | End: 2020-01-01 | Stop reason: ALTCHOICE

## 2020-01-01 RX ORDER — NAPROXEN 500 MG/1
500 TABLET ORAL 2 TIMES DAILY WITH MEALS
Qty: 30 TAB | Refills: 0 | Status: SHIPPED | OUTPATIENT
Start: 2020-01-01 | End: 2020-01-01 | Stop reason: SDUPTHER

## 2020-01-01 RX ORDER — LEVOTHYROXINE SODIUM 50 UG/1
50 TABLET ORAL
Qty: 90 TAB | Refills: 1 | Status: CANCELLED | OUTPATIENT
Start: 2020-01-01

## 2020-01-01 RX ORDER — PAROXETINE HYDROCHLORIDE 40 MG/1
40 TABLET, FILM COATED ORAL DAILY
Qty: 90 TAB | Refills: 1 | Status: SHIPPED | OUTPATIENT
Start: 2020-01-01 | End: 2020-01-01 | Stop reason: SDUPTHER

## 2020-01-01 RX ORDER — POTASSIUM CHLORIDE 750 MG/1
20 TABLET, EXTENDED RELEASE ORAL DAILY
Qty: 180 TAB | Refills: 0 | Status: SHIPPED | OUTPATIENT
Start: 2020-01-01 | End: 2020-01-01 | Stop reason: SDUPTHER

## 2020-01-01 RX ORDER — METFORMIN HYDROCHLORIDE 1000 MG/1
1000 TABLET ORAL 2 TIMES DAILY WITH MEALS
Qty: 20 TAB | Refills: 0 | Status: SHIPPED | OUTPATIENT
Start: 2020-01-01

## 2020-01-01 RX ORDER — POTASSIUM CHLORIDE 750 MG/1
20 TABLET, EXTENDED RELEASE ORAL DAILY
Qty: 180 TAB | Refills: 1 | Status: SHIPPED | OUTPATIENT
Start: 2020-01-01 | End: 2020-01-01 | Stop reason: SDUPTHER

## 2020-01-01 RX ORDER — ATORVASTATIN CALCIUM 10 MG/1
10 TABLET, FILM COATED ORAL DAILY
Qty: 90 TAB | Refills: 1 | Status: SHIPPED | OUTPATIENT
Start: 2020-01-01

## 2020-01-01 RX ORDER — FLUTICASONE PROPIONATE AND SALMETEROL 250; 50 UG/1; UG/1
1 POWDER RESPIRATORY (INHALATION) EVERY 12 HOURS
Qty: 3 INHALER | Refills: 1 | Status: SHIPPED | OUTPATIENT
Start: 2020-01-01

## 2020-01-01 RX ORDER — GABAPENTIN 100 MG/1
100 CAPSULE ORAL 3 TIMES DAILY
Qty: 270 CAP | Refills: 1 | Status: CANCELLED | OUTPATIENT
Start: 2020-01-01

## 2020-01-01 RX ORDER — FLUTICASONE PROPIONATE AND SALMETEROL 250; 50 UG/1; UG/1
1 POWDER RESPIRATORY (INHALATION) EVERY 12 HOURS
Qty: 3 INHALER | Refills: 2 | Status: SHIPPED | OUTPATIENT
Start: 2020-01-01 | End: 2020-01-01 | Stop reason: SDUPTHER

## 2020-01-01 RX ORDER — OMEPRAZOLE 40 MG/1
40 CAPSULE, DELAYED RELEASE ORAL DAILY
Qty: 90 CAP | Refills: 1 | Status: CANCELLED | OUTPATIENT
Start: 2020-01-01

## 2020-01-01 RX ORDER — GLIPIZIDE 5 MG/1
5 TABLET ORAL 2 TIMES DAILY
Qty: 180 TAB | Refills: 1 | Status: SHIPPED | OUTPATIENT
Start: 2020-01-01 | End: 2020-01-01 | Stop reason: SDUPTHER

## 2020-01-01 RX ORDER — POTASSIUM CHLORIDE 750 MG/1
20 TABLET, EXTENDED RELEASE ORAL DAILY
Qty: 180 TAB | Refills: 1 | Status: SHIPPED | OUTPATIENT
Start: 2020-01-01

## 2020-01-01 RX ORDER — PAROXETINE HYDROCHLORIDE 40 MG/1
40 TABLET, FILM COATED ORAL DAILY
Qty: 90 TAB | Refills: 1 | Status: SHIPPED | OUTPATIENT
Start: 2020-01-01

## 2020-01-01 RX ORDER — POTASSIUM CHLORIDE 750 MG/1
20 TABLET, EXTENDED RELEASE ORAL DAILY
Qty: 180 TAB | Refills: 1 | Status: CANCELLED | OUTPATIENT
Start: 2020-01-01

## 2020-01-01 RX ORDER — LISINOPRIL 40 MG/1
40 TABLET ORAL DAILY
Qty: 90 TAB | Refills: 1 | Status: SHIPPED | OUTPATIENT
Start: 2020-01-01

## 2020-01-01 RX ORDER — METFORMIN HYDROCHLORIDE 1000 MG/1
1000 TABLET ORAL 2 TIMES DAILY WITH MEALS
Qty: 180 TAB | Refills: 0 | Status: CANCELLED | OUTPATIENT
Start: 2020-01-01

## 2020-01-01 RX ORDER — DOCUSATE SODIUM 100 MG/1
100 CAPSULE, LIQUID FILLED ORAL 2 TIMES DAILY
Qty: 180 CAP | Refills: 1 | Status: SHIPPED | OUTPATIENT
Start: 2020-01-01 | End: 2020-01-01 | Stop reason: ALTCHOICE

## 2020-01-01 RX ORDER — ATORVASTATIN CALCIUM 10 MG/1
10 TABLET, FILM COATED ORAL DAILY
Qty: 90 TAB | Refills: 1 | Status: SHIPPED | OUTPATIENT
Start: 2020-01-01 | End: 2020-01-01 | Stop reason: SDUPTHER

## 2020-01-01 RX ORDER — METFORMIN HYDROCHLORIDE 1000 MG/1
1000 TABLET ORAL 2 TIMES DAILY WITH MEALS
Qty: 180 TAB | Refills: 0 | Status: SHIPPED | OUTPATIENT
Start: 2020-01-01 | End: 2020-01-01 | Stop reason: SDUPTHER

## 2020-01-01 RX ORDER — GABAPENTIN 100 MG/1
100 CAPSULE ORAL 3 TIMES DAILY
Qty: 270 CAP | Refills: 0 | Status: SHIPPED | OUTPATIENT
Start: 2020-01-01 | End: 2020-01-01 | Stop reason: SDUPTHER

## 2020-01-01 RX ORDER — ALBUTEROL SULFATE 90 UG/1
2 AEROSOL, METERED RESPIRATORY (INHALATION)
Qty: 2 INHALER | Refills: 0 | Status: SHIPPED | OUTPATIENT
Start: 2020-01-01

## 2020-01-01 RX ORDER — LEVOTHYROXINE SODIUM 50 UG/1
50 TABLET ORAL
Qty: 90 TAB | Refills: 1 | Status: SHIPPED | OUTPATIENT
Start: 2020-01-01

## 2020-01-01 RX ORDER — METFORMIN HYDROCHLORIDE 1000 MG/1
1000 TABLET ORAL 2 TIMES DAILY WITH MEALS
Qty: 180 TAB | Refills: 1 | Status: SHIPPED | OUTPATIENT
Start: 2020-01-01 | End: 2020-01-01 | Stop reason: SDUPTHER

## 2020-01-01 RX ORDER — HYDROCORTISONE 25 MG/G
CREAM TOPICAL 4 TIMES DAILY
Qty: 30 G | Refills: 2 | Status: SHIPPED | OUTPATIENT
Start: 2020-01-01 | End: 2020-01-01 | Stop reason: ALTCHOICE

## 2020-01-01 RX ORDER — MECLIZINE HYDROCHLORIDE 25 MG/1
25 TABLET ORAL
Qty: 60 TAB | Refills: 1 | Status: SHIPPED | OUTPATIENT
Start: 2020-01-01 | End: 2020-01-01 | Stop reason: SDUPTHER

## 2020-01-01 RX ORDER — LEVOTHYROXINE SODIUM 50 UG/1
50 TABLET ORAL
Qty: 90 TAB | Refills: 0 | Status: SHIPPED | OUTPATIENT
Start: 2020-01-01 | End: 2020-01-01 | Stop reason: SDUPTHER

## 2020-01-01 RX ORDER — GABAPENTIN 100 MG/1
100 CAPSULE ORAL 3 TIMES DAILY
Qty: 270 CAP | Refills: 1 | Status: SHIPPED | OUTPATIENT
Start: 2020-01-01 | End: 2020-01-01 | Stop reason: SDUPTHER

## 2020-01-01 RX ORDER — LISINOPRIL 40 MG/1
40 TABLET ORAL DAILY
Qty: 90 TAB | Refills: 1 | Status: SHIPPED | OUTPATIENT
Start: 2020-01-01 | End: 2020-01-01 | Stop reason: SDUPTHER

## 2020-01-01 RX ORDER — LEVOTHYROXINE SODIUM 50 UG/1
50 TABLET ORAL
Qty: 90 TAB | Refills: 0 | Status: CANCELLED | OUTPATIENT
Start: 2020-01-01

## 2020-01-01 RX ORDER — PAROXETINE HYDROCHLORIDE 40 MG/1
40 TABLET, FILM COATED ORAL DAILY
Qty: 90 TAB | Refills: 1 | Status: CANCELLED | OUTPATIENT
Start: 2020-01-01

## 2020-01-01 RX ORDER — POTASSIUM CHLORIDE 750 MG/1
20 TABLET, EXTENDED RELEASE ORAL DAILY
Qty: 180 TAB | Refills: 0 | Status: CANCELLED | OUTPATIENT
Start: 2020-01-01

## 2020-01-01 RX ORDER — PROPRANOLOL HYDROCHLORIDE 60 MG/1
60 CAPSULE, EXTENDED RELEASE ORAL DAILY
Qty: 90 CAP | Refills: 1 | Status: CANCELLED | OUTPATIENT
Start: 2020-01-01

## 2020-01-01 RX ORDER — CHLORTHALIDONE 50 MG/1
50 TABLET ORAL
Qty: 90 TAB | Refills: 0 | Status: SHIPPED | OUTPATIENT
Start: 2020-01-01

## 2020-01-01 RX ORDER — GLIPIZIDE 5 MG/1
5 TABLET ORAL 2 TIMES DAILY
Qty: 180 TAB | Refills: 1 | Status: SHIPPED | OUTPATIENT
Start: 2020-01-01

## 2020-01-01 RX ORDER — FENOFIBRATE 48 MG/1
48 TABLET, COATED ORAL DAILY
Qty: 90 TAB | Refills: 1 | Status: SHIPPED | OUTPATIENT
Start: 2020-01-01 | End: 2020-01-01 | Stop reason: ALTCHOICE

## 2020-01-01 RX ORDER — OMEPRAZOLE 40 MG/1
40 CAPSULE, DELAYED RELEASE ORAL DAILY
Qty: 90 CAP | Refills: 1 | Status: SHIPPED | OUTPATIENT
Start: 2020-01-01 | End: 2020-01-01 | Stop reason: ALTCHOICE

## 2020-01-01 RX ORDER — PROMETHAZINE HYDROCHLORIDE 25 MG/1
25 TABLET ORAL
Qty: 90 TAB | Refills: 1 | Status: SHIPPED | OUTPATIENT
Start: 2020-01-01 | End: 2020-01-01 | Stop reason: SDUPTHER

## 2020-01-01 RX ORDER — FENOFIBRATE 48 MG/1
48 TABLET, COATED ORAL DAILY
Qty: 90 TAB | Refills: 1 | Status: SHIPPED | OUTPATIENT
Start: 2020-01-01 | End: 2020-01-01 | Stop reason: SDUPTHER

## 2020-01-01 RX ORDER — GABAPENTIN 100 MG/1
100 CAPSULE ORAL 3 TIMES DAILY
Qty: 270 CAP | Refills: 1 | Status: SHIPPED | OUTPATIENT
Start: 2020-01-01

## 2020-01-01 RX ORDER — MECLIZINE HYDROCHLORIDE 25 MG/1
25 TABLET ORAL
Qty: 60 TAB | Refills: 1 | Status: SHIPPED | OUTPATIENT
Start: 2020-01-01

## 2020-01-01 RX ORDER — PROPRANOLOL HYDROCHLORIDE 60 MG/1
60 CAPSULE, EXTENDED RELEASE ORAL DAILY
Qty: 90 CAP | Refills: 1 | Status: SHIPPED | OUTPATIENT
Start: 2020-01-01 | End: 2020-01-01 | Stop reason: SDUPTHER

## 2020-01-01 RX ORDER — PROPRANOLOL HYDROCHLORIDE 60 MG/1
60 CAPSULE, EXTENDED RELEASE ORAL DAILY
Qty: 90 CAP | Refills: 1 | Status: SHIPPED | OUTPATIENT
Start: 2020-01-01

## 2020-01-01 RX ORDER — NAPROXEN 500 MG/1
500 TABLET ORAL
Qty: 40 TAB | Refills: 0 | Status: SHIPPED | OUTPATIENT
Start: 2020-01-01 | End: 2020-01-01 | Stop reason: ALTCHOICE

## 2020-01-08 NOTE — TELEPHONE ENCOUNTER
Patient request refills be sent to the pharmacy. Patient also dropped of prior authorization form for [facility] [place] [facility] [place]. Please advise. Requested Prescriptions     Pending Prescriptions Disp Refills    atorvastatin (LIPITOR) 10 mg tablet 90 Tab 1     Sig: Take 1 Tab by mouth daily.  metFORMIN (GLUCOPHAGE) 1,000 mg tablet 180 Tab 1     Sig: Take 1 Tab by mouth two (2) times daily (with meals).  fenofibrate nanocrystallized (TRICOR) 48 mg tablet 90 Tab 0     Sig: Take 1 Tab by mouth daily.  levothyroxine (SYNTHROID) 50 mcg tablet 90 Tab 1     Sig: Take 1 Tab by mouth Daily (before breakfast).

## 2020-01-08 NOTE — TELEPHONE ENCOUNTER
Levothyroxine last ordered 12/30/19 qty 90 with 1 refill. Refill not needed at this time. Tricor last ordered 07/09/19 qty 90 with 0 refills. Metformin last ordered 12/05/18 qty 180 with 1 refill. Lipitor last ordered 07/06/19 qty 90 with 1 refill.          Last appt 12/11/19  Next scheduled 01/16/2020

## 2020-01-16 NOTE — PROGRESS NOTES
This is the Subsequent Medicare Annual Wellness Exam, performed 12 months or more after the Initial AWV or the last Subsequent AWV I have reviewed the patient's medical history in detail and updated the computerized patient record. History Patient Active Problem List  
Diagnosis Code  Hypertension I10  
 Diabetes ([address] E11.9  GERD (gastroesophageal reflux disease) K21.9  Depression F32.9  
 HLD (hyperlipidemia) E78.5  Acquired hypothyroidism E03.9  Benign positional vertigo H81.10  Asthma J45.909  Sleep disturbance G47.9 Past Medical History:  
Diagnosis Date  Acquired hypothyroidism 2016  Asthma 2016  Benign positional vertigo 2016  Depression  Diabetes ([address]  Diabetic neuropathy ([address] feet  Dizziness  GERD (gastroesophageal reflux disease)  HLD (hyperlipidemia) 2016  Hypercholesterolemia  Hypertension  Sleep disturbance 2016  Thyroid disease Past Surgical History:  
Procedure Laterality Date  HX APPENDECTOMY  2016 Dr. Ced Guido  HX  SECTION    
 HX CHOLECYSTECTOMY  HX COLONOSCOPY  2016 Repeat colonoscopy in 5 years per Dr. Juarez January; last 16  HX HERNIA REPAIR  2017 Dr. Gabriel Palmer. Current Outpatient Medications Medication Sig Dispense Refill  docusate sodium (COLACE) 100 mg capsule Take 1 Cap by mouth two (2) times a day. 180 Cap 1  propranolol LA (INDERAL LA) 60 mg SR capsule Take 1 Cap by mouth daily. Indications: migraine prevention 90 Cap 1  
 meclizine (ANTIVERT) 25 mg tablet Take 1 Tab by mouth three (3) times daily as needed for Dizziness. Indications: sensation of spinning or whirling 60 Tab 1  
 atorvastatin (LIPITOR) 10 mg tablet Take 1 Tab by mouth daily. 90 Tab 1  
 glipiZIDE (GLUCOTROL) 5 mg tablet Take 1 Tab by mouth two (2) times a day.  180 Tab 1  
  lisinopril (PRINIVIL, ZESTRIL) 40 mg tablet Take 1 Tab by mouth daily. 90 Tab 1  
 gabapentin (NEURONTIN) 100 mg capsule Take 1 Cap by mouth three (3) times daily. 270 Cap 1  
 metFORMIN (GLUCOPHAGE) 1,000 mg tablet Take 1 Tab by mouth two (2) times daily (with meals). 180 Tab 1  
 fenofibrate nanocrystallized (TRICOR) 48 mg tablet Take 1 Tab by mouth daily. 90 Tab 1  
 SITagliptin (JANUVIA) 100 mg tablet Take 1 Tab by mouth daily. 90 Tab 1  
 levothyroxine (SYNTHROID) 50 mcg tablet Take 1 Tab by mouth Daily (before breakfast). 90 Tab 1  polyethylene glycol (MIRALAX) 17 gram packet Take 1 Packet by mouth daily. 90 Packet 2  phenylephrine 0.25 % suppository Insert 1 Suppository into rectum two (2) times a day. 60 Suppository 2  potassium chloride (KLOR-CON) 10 mEq tablet Take 1 Tab by mouth daily. 90 Tab 0  
 alendronate (FOSAMAX) 70 mg tablet Take 1 Tab by mouth every seven (7) days. 12 Tab 2  
 albuterol (PROVENTIL HFA, VENTOLIN HFA, PROAIR HFA) 90 mcg/actuation inhaler Take 2 Puffs by inhalation every four (4) hours as needed for Wheezing. 2 Inhaler 2  chlorthalidone (HYGROTEN) 25 mg tablet Take 1 Tab by mouth daily. 90 Tab 2  
 fluticasone propionate (FLONASE) 50 mcg/actuation nasal spray 2 Sprays by Both Nostrils route daily. 1 Bottle 2  
 PARoxetine (PAXIL) 40 mg tablet Take 1 Tab by mouth daily. 90 Tab 2  cetirizine (ZYRTEC) 10 mg tablet Take 1 Tab by mouth daily. 90 Tab 0  
 omeprazole (PRILOSEC) 40 mg capsule Take 40 mg by mouth daily.  garlic 5,695 mg cap Take  by mouth.  ginger, Zingiber officinalis, (GINGER EXTRACT) 250 mg cap Take  by mouth.  Omega-3-DHA-EPA-Fish Oil 1,000 mg (120 mg-180 mg) cap Take  by mouth daily.  hydrocortisone (ANUSOL-HC) 2.5 % rectal cream Insert  into rectum four (4) times daily. 30 g 2  
 fluticasone propion-salmeterol (ADVAIR/WIXELA) 250-50 mcg/dose diskus inhaler Take 1 Puff by inhalation every twelve (12) hours.  3 Inhaler 2  
 
 Allergies Allergen Reactions  Codeine Nausea and Vomiting Family History Problem Relation Age of Onset  Diabetes Mother  Heart Disease Mother  Thyroid Disease Sister Social History Tobacco Use  Smoking status: Never Smoker  Smokeless tobacco: Never Used Substance Use Topics  Alcohol use: Yes Depression Risk Factor Screening:  
 
3 most recent PHQ Screens 1/15/2019 PHQ Not Done - Little interest or pleasure in doing things Not at all Feeling down, depressed, irritable, or hopeless Not at all Total Score PHQ 2 0 Alcohol Risk Factor Screening: Do you average 1 drink per night or more than 7 drinks a week:  No 
 
On any one occasion in the past three months have you have had more than 3 drinks containing alcohol:  No 
 
 
Functional Ability and Level of Safety:  
Hearing: Hearing is good. Activities of Daily Living: The home contains: no safety equipment. Patient does total self care Ambulation: with no difficulty Fall Risk: 
Fall Risk Assessment, last 12 mths 1/16/2020 Able to walk? Yes Fall in past 12 months? Yes Fall with injury? Yes  
Number of falls in past 12 months 1 Fall Risk Score 2 Abuse Screen: 
Patient is not abused Cognitive Screening Has your family/caregiver stated any concerns about your memory: no 
 
 
Patient Care Team  
Patient Care Team: 
Goran Valdivia MD as PCP - General (Internal Medicine) Goran Valdivia MD as PCP - REHABILITATION [facility] Empaneled Provider Olesya Carson NP (Nurse Practitioner) Keesha Selby MD (Inactive) (Colon and Rectal Surgery) Mani Nelson MD (Otolaryngology) Assessment/Plan Education and counseling provided: 
Are appropriate based on today's review and evaluation Diagnoses and all orders for this visit: 
 
1. Medicare annual wellness visit, subsequent 2. Persistent headaches -     propranolol LA (INDERAL LA) 60 mg SR capsule;  Take 1 Cap by mouth daily. Indications: migraine prevention 3. Pure hypercholesterolemia 
-     atorvastatin (LIPITOR) 10 mg tablet; Take 1 Tab by mouth daily. 4. Type 2 diabetes mellitus with diabetic polyneuropathy, without long-term current use of insulin (HCC) 
-     glipiZIDE (GLUCOTROL) 5 mg tablet; Take 1 Tab by mouth two (2) times a day. -     gabapentin (NEURONTIN) 100 mg capsule; Take 1 Cap by mouth three (3) times daily. 5. Essential hypertension 
-     lisinopril (PRINIVIL, ZESTRIL) 40 mg tablet; Take 1 Tab by mouth daily. 6. Generalized abdominal pain 
-     REFERRAL TO GASTROENTEROLOGY 7. Pancreatic cyst 
-     CT ABD W WO CONT; Future 8. Hypovitaminosis D 
-     VITAMIN D, 25 HYDROXY; Future Other orders 
-     docusate sodium (COLACE) 100 mg capsule; Take 1 Cap by mouth two (2) times a day. -     meclizine (ANTIVERT) 25 mg tablet; Take 1 Tab by mouth three (3) times daily as needed for Dizziness. Indications: sensation of spinning or whirling Health Maintenance Due Topic Date Due  Shingrix Vaccine Age 50> (1 of 2) 04/05/2001  BREAST CANCER SCRN MAMMOGRAM  11/16/2019  MICROALBUMIN Q1  01/15/2020  LIPID PANEL Q1  01/15/2020  Pneumococcal 65+ years (2 of 2 - PPSV23) 01/15/2020  MEDICARE YEARLY EXAM  01/16/2020

## 2020-01-16 NOTE — PROGRESS NOTES
Nancy Stanley is a 77 y.o. female (: 1950) presenting to address: 
 
No chief complaint on file. Vitals:  
 20 1305 BP: 131/63 Pulse: 68 Resp: 16 Temp: 97 °F (36.1 °C) TempSrc: Oral  
SpO2: 96% Weight: 137 lb (62.1 kg) Height: 5' 1\" (1.549 m) PainSc:   6 PainLoc: Head Hearing/Vision: No exam data present Learning Assessment:  
 
Learning Assessment 2016 PRIMARY LEARNER Patient HIGHEST LEVEL OF EDUCATION - PRIMARY LEARNER  SOME COLLEGE PRIMARY LANGUAGE ENGLISH  
LEARNER PREFERENCE PRIMARY DEMONSTRATION  
ANSWERED BY patient RELATIONSHIP SELF Depression Screening:  
 
3 most recent PHQ Screens 1/15/2019 PHQ Not Done - Little interest or pleasure in doing things Not at all Feeling down, depressed, irritable, or hopeless Not at all Total Score PHQ 2 0 Fall Risk Assessment:  
 
Fall Risk Assessment, last 12 mths 2020 Able to walk? Yes Fall in past 12 months? Yes Fall with injury? Yes  
Number of falls in past 12 months 1 Fall Risk Score 2 Abuse Screening:  
 
Abuse Screening Questionnaire 1/15/2019 Do you ever feel afraid of your partner? Daniella Alcala Are you in a relationship with someone who physically or mentally threatens you? Daniella Alcala Is it safe for you to go home? Mak Martin Coordination of Care Questionaire: 1. Have you been to the ER, urgent care clinic since your last visit? Hospitalized since your last visit? NO 
 
2. Have you seen or consulted any other health care providers outside of the [address] [place] [address] since your last visit? Include any pap smears or colon screening. NO Advanced Directive: 1. Do you have an Advanced Directive? NO 
 
2. Would you like information on Advanced Directives?  NO

## 2020-01-16 NOTE — PROGRESS NOTES
Assessment/Plan: *Diagnoses and all orders for this visit: 1. Pure hypercholesterolemia 
-     atorvastatin (LIPITOR) 10 mg tablet; Take 1 Tab by mouth daily. 2. Persistent headaches -     propranolol LA (INDERAL LA) 60 mg SR capsule; Take 1 Cap by mouth daily. Indications: migraine prevention 3. Type 2 diabetes mellitus with diabetic polyneuropathy, without long-term current use of insulin (HCC) 
-     glipiZIDE (GLUCOTROL) 5 mg tablet; Take 1 Tab by mouth two (2) times a day. -     gabapentin (NEURONTIN) 100 mg capsule; Take 1 Cap by mouth three (3) times daily. 4. Essential hypertension 
-     lisinopril (PRINIVIL, ZESTRIL) 40 mg tablet; Take 1 Tab by mouth daily. 5. Generalized abdominal pain 
-     REFERRAL TO GASTROENTEROLOGY 6. Pancreatic cyst 
-     CT ABD W WO CONT; Future 7. Medicare annual wellness visit, subsequent 8. Hypovitaminosis D 
-     VITAMIN D, 25 HYDROXY; Future Other orders 
-     docusate sodium (COLACE) 100 mg capsule; Take 1 Cap by mouth two (2) times a day. -     meclizine (ANTIVERT) 25 mg tablet; Take 1 Tab by mouth three (3) times daily as needed for Dizziness. Indications: sensation of spinning or whirling The plan was discussed with the patient. The patient verbalized understanding and is in agreement with the plan. All medication potential side effects were discussed with the patient. 
 
------------------------------------------------------------------------------------------------------------------- Zeke Chua is a 77 y.o. female and presents with No chief complaint on file. Subjective: 
Pt here for f/u. DM:  Needs labs, did not do them yet. HLD:  Will repeat. HTN:  Well controlled. She continues to have pain in her abdomen; no change in her BMs. She had complained about this pain in 2018, I ordered a CT which showed cysts in the pancreas.   I relayed this information to her as the recommendation was to repeat the CT or an MRI yearly to track this for 5 years. I advised her that this needed to be evaluated and followed and referred her to GI for them to manage. On questioning her today about this, I found out that she never followed through on this and never saw GI. Review of Systems - ENT ROS: negative Respiratory ROS: no cough, shortness of breath, or wheezing Cardiovascular ROS: no chest pain or dyspnea on exertion Gastrointestinal ROS: positive for - abdominal pain Musculoskeletal ROS: negative The problem list was updated as a part of today's visit. Patient Active Problem List  
Diagnosis Code  Hypertension I10  
 Diabetes ([facility] [address] E11.9  GERD (gastroesophageal reflux disease) K21.9  Depression F32.9  
 HLD (hyperlipidemia) E78.5  Acquired hypothyroidism E03.9  Benign positional vertigo H81.10  Asthma J45.909  Sleep disturbance G47.9 The PSH, FH were reviewed. SH: Social History Tobacco Use  Smoking status: Never Smoker  Smokeless tobacco: Never Used Substance Use Topics  Alcohol use: Yes  Drug use: No  
 
 
Medications/Allergies: 
Current Outpatient Medications on File Prior to Visit Medication Sig Dispense Refill  metFORMIN (GLUCOPHAGE) 1,000 mg tablet Take 1 Tab by mouth two (2) times daily (with meals). 180 Tab 1  
 fenofibrate nanocrystallized (TRICOR) 48 mg tablet Take 1 Tab by mouth daily. 90 Tab 1  
 SITagliptin (JANUVIA) 100 mg tablet Take 1 Tab by mouth daily. 90 Tab 1  
 levothyroxine (SYNTHROID) 50 mcg tablet Take 1 Tab by mouth Daily (before breakfast). 90 Tab 1  polyethylene glycol (MIRALAX) 17 gram packet Take 1 Packet by mouth daily. 90 Packet 2  phenylephrine 0.25 % suppository Insert 1 Suppository into rectum two (2) times a day. 60 Suppository 2  potassium chloride (KLOR-CON) 10 mEq tablet Take 1 Tab by mouth daily.  90 Tab 0  
 alendronate (FOSAMAX) 70 mg tablet Take 1 Tab by mouth every seven (7) days. 12 Tab 2  
 albuterol (PROVENTIL HFA, VENTOLIN HFA, PROAIR HFA) 90 mcg/actuation inhaler Take 2 Puffs by inhalation every four (4) hours as needed for Wheezing. 2 Inhaler 2  chlorthalidone (HYGROTEN) 25 mg tablet Take 1 Tab by mouth daily. 90 Tab 2  
 fluticasone propionate (FLONASE) 50 mcg/actuation nasal spray 2 Sprays by Both Nostrils route daily. 1 Bottle 2  
 PARoxetine (PAXIL) 40 mg tablet Take 1 Tab by mouth daily. 90 Tab 2  cetirizine (ZYRTEC) 10 mg tablet Take 1 Tab by mouth daily. 90 Tab 0  
 omeprazole (PRILOSEC) 40 mg capsule Take 40 mg by mouth daily.  garlic 3,258 mg cap Take  by mouth.  ginger, Zingiber officinalis, (GINGER EXTRACT) 250 mg cap Take  by mouth.  Omega-3-DHA-EPA-Fish Oil 1,000 mg (120 mg-180 mg) cap Take  by mouth daily.  [DISCONTINUED] atorvastatin (LIPITOR) 10 mg tablet Take 1 Tab by mouth daily. 90 Tab 1  
 hydrocortisone (ANUSOL-HC) 2.5 % rectal cream Insert  into rectum four (4) times daily. 30 g 2  
 [DISCONTINUED] meclizine (ANTIVERT) 25 mg tablet Take 1 Tab by mouth three (3) times daily as needed for Dizziness. Indications: sensation of spinning or whirling 60 Tab 1  
 fluticasone propion-salmeterol (ADVAIR/WIXELA) 250-50 mcg/dose diskus inhaler Take 1 Puff by inhalation every twelve (12) hours. 3 Inhaler 2  
 [DISCONTINUED] docusate sodium (COLACE) 100 mg capsule Take 1 Cap by mouth two (2) times a day. 180 Cap 1  
 [DISCONTINUED] gabapentin (NEURONTIN) 100 mg capsule Take 1 Cap by mouth three (3) times daily. 270 Cap 1  
 [DISCONTINUED] glipiZIDE (GLUCOTROL) 5 mg tablet Take 1 Tab by mouth two (2) times a day. 180 Tab 1  [DISCONTINUED] propranolol LA (INDERAL LA) 60 mg SR capsule Take 1 Cap by mouth daily. Indications: Migraine Prevention 90 Cap 1  
 [DISCONTINUED] lisinopril (PRINIVIL, ZESTRIL) 40 mg tablet Take 1 Tab by mouth daily. 90 Tab 1 No current facility-administered medications on file prior to visit. Allergies Allergen Reactions  Codeine Nausea and Vomiting Health Maintenance:  
Health Maintenance Topic Date Due  Shingrix Vaccine Age 50> (1 of 2) 04/05/2001  BREAST CANCER SCRN MAMMOGRAM  11/16/2019  MICROALBUMIN Q1  01/15/2020  LIPID PANEL Q1  01/15/2020  Pneumococcal 65+ years (2 of 2 - PPSV23) 01/15/2020  MEDICARE YEARLY EXAM  01/16/2020  
 HEMOGLOBIN A1C Q6M  03/12/2020  GLAUCOMA SCREENING Q2Y  04/17/2020  
 EYE EXAM RETINAL OR DILATED  04/17/2020  
 DTaP/Tdap/Td series (2 - Td) 08/11/2020  
 FOOT EXAM Q1  09/12/2020  COLONOSCOPY  12/21/2021  Hepatitis C Screening  Completed  Bone Densitometry (Dexa) Screening  Completed  Influenza Age 5 to Adult  Completed Objective: 
Visit Vitals /63 Pulse 68 Temp 97 °F (36.1 °C) (Oral) Resp 16 Ht 5' 1\" (1.549 m) Wt 137 lb (62.1 kg) SpO2 96% BMI 25.89 kg/m² Nurses notes and VS reviewed. Physical Examination: General appearance - alert, well appearing, and in no distress Ears - bilateral TM's and external ear canals normal 
Mouth - mucous membranes moist, pharynx normal without lesions Neck - supple, no significant adenopathy Chest - clear to auscultation, no wheezes, rales or rhonchi, symmetric air entry Heart - normal rate and regular rhythm Abdomen - tenderness noted diffusely and mild Musculoskeletal - no joint tenderness, deformity or swelling Extremities - peripheral pulses normal, no pedal edema, no clubbing or cyanosis Labwork and Ancillary Studies: CBC w/Diff Lab Results Component Value Date/Time WBC 11.7 01/15/2019 11:54 AM  
 HGB 12.8 01/15/2019 11:54 AM  
 PLATELET 023 83/65/7890 11:54 AM  
  
 
 Basic Metabolic Profile Lab Results Component Value Date/Time  Sodium 140 01/15/2019 11:54 AM  
 Potassium 3.9 01/15/2019 11:54 AM  
 Chloride 100 01/15/2019 11:54 AM  
 CO2 31 01/15/2019 11:54 AM  
 Anion gap 9 01/15/2019 11:54 AM  
 Glucose 129 (H) 01/15/2019 11:54 AM  
 BUN 11 01/15/2019 11:54 AM  
 Creatinine 0.62 01/15/2019 11:54 AM  
 BUN/Creatinine ratio 18 01/15/2019 11:54 AM  
 GFR est AA >60 01/15/2019 11:54 AM  
 GFR est non-AA >60 01/15/2019 11:54 AM  
 Calcium 9.7 01/15/2019 11:54 AM  
  
  
LFT Lab Results Component Value Date/Time ALT (SGPT) 20 01/15/2019 11:54 AM  
 AST (SGOT) 12 (L) 01/15/2019 11:54 AM  
 Alk. phosphatase 48 01/15/2019 11:54 AM  
 Bilirubin, direct 0.1 01/15/2019 11:54 AM  
 Bilirubin, total 0.5 01/15/2019 11:54 AM  
 
 
 
Cholesterol Lab Results Component Value Date/Time  Cholesterol, total 166 01/15/2019 11:54 AM  
 HDL Cholesterol 48 01/15/2019 11:54 AM  
 LDL, calculated 93 01/15/2019 11:54 AM  
 Triglyceride 125 01/15/2019 11:54 AM  
 CHOL/HDL Ratio 3.5 01/15/2019 11:54 AM

## 2020-01-16 NOTE — PATIENT INSTRUCTIONS
Medicare Wellness Visit, Female The best way to live healthy is to have a lifestyle where you eat a well-balanced diet, exercise regularly, limit alcohol use, and quit all forms of tobacco/nicotine, if applicable. Regular preventive services are another way to keep healthy. Preventive services (vaccines, screening tests, monitoring & exams) can help personalize your care plan, which helps you manage your own care. Screening tests can find health problems at the earliest stages, when they are easiest to treat. [facility] follows the current, evidence-based guidelines published by the [facility] ([facility]) when recommending preventive services for our patients. Because we follow these guidelines, sometimes recommendations change over time as research supports it. (For example, mammograms used to be recommended annually. Even though Medicare will still pay for an annual mammogram, the newer guidelines recommend a mammogram every two years for women of average risk). Of course, you and your doctor may decide to screen more often for some diseases, based on your risk and your co-morbidities (chronic disease you are already diagnosed with). Preventive services for you include: - Medicare offers their members a free annual wellness visit, which is time for you and your primary care provider to discuss and plan for your preventive service needs. Take advantage of this benefit every year! 
-All adults over the age of 72 should receive the recommended pneumonia vaccines. Current USPSTF guidelines recommend a series of two vaccines for the best pneumonia protection.  
-All adults should have a flu vaccine yearly and a tetanus vaccine every 10 years.  
-All adults age 48 and older should receive the shingles vaccines (series of two vaccines). -All adults age 38-68 who are overweight should have a diabetes screening test once every three years. -All adults born between [birth year] and 1964 should be screened once for Hepatitis C. 
-Other screening tests and preventive services for persons with diabetes include: an eye exam to screen for diabetic retinopathy, a kidney function test, a foot exam, and stricter control over your cholesterol.  
-Cardiovascular screening for adults with routine risk involves an electrocardiogram (ECG) at intervals determined by your doctor.  
-Colorectal cancer screenings should be done for adults age 54-65 with no increased risk factors for colorectal cancer. There are a number of acceptable methods of screening for this type of cancer. Each test has its own benefits and drawbacks. Discuss with your doctor what is most appropriate for you during your annual wellness visit. The different tests include: colonoscopy (considered the best screening method), a fecal occult blood test, a fecal DNA test, and sigmoidoscopy. 
 
-A bone mass density test is recommended when a woman turns 65 to screen for osteoporosis. This test is only recommended one time, as a screening. Some providers will use this same test as a disease monitoring tool if you already have osteoporosis. -Breast cancer screenings are recommended every other year for women of normal risk, age 54-69. 
-Cervical cancer screenings for women over age 72 are only recommended with certain risk factors. Here is a list of your current Health Maintenance items (your personalized list of preventive services) with a due date: 
Health Maintenance Due Topic Date Due  Shingles Vaccine (1 of 2) 04/05/2001  Mammogram  11/16/2019  Albumin Urine Test  01/15/2020  Cholesterol Test   01/15/2020  Pneumococcal Vaccine (2 of 2 - PPSV23) 01/15/2020 [address] [place] [address] Annual Well Visit  01/16/2020

## 2020-01-22 NOTE — PROGRESS NOTES
Watch sugars closely, a1c is rising. Potassium is low. Increase klorcon to 2 tabs every day. I have sent in a new Rx. Vit d low. Needs to start OTC Vit D 1000 unit every day. Important for her bones.

## 2020-03-04 NOTE — PROGRESS NOTES
Ashlee Mast is a 77 y.o. female (: 1950) presenting to address: Chief Complaint Patient presents with  Leg Swelling Bilateral, painful and itching Vitals:  
 20 1044 BP: 160/83 Pulse: 73 Resp: 18 Temp: 98.6 °F (37 °C) TempSrc: Oral  
SpO2: 98% Weight: 144 lb (65.3 kg) Height: 5' 1\" (1.549 m) PainSc:   6 PainLoc: Leg  
 
 
Learning Assessment:  
 
Learning Assessment 2016 PRIMARY LEARNER Patient HIGHEST LEVEL OF EDUCATION - PRIMARY LEARNER  SOME COLLEGE PRIMARY LANGUAGE ENGLISH  
LEARNER PREFERENCE PRIMARY DEMONSTRATION  
ANSWERED BY patient RELATIONSHIP SELF Depression Screening:  
 
3 most recent PHQ Screens 1/15/2019 PHQ Not Done - Little interest or pleasure in doing things Not at all Feeling down, depressed, irritable, or hopeless Not at all Total Score PHQ 2 0 Fall Risk Assessment:  
 
Fall Risk Assessment, last 12 mths 2020 Able to walk? Yes Fall in past 12 months? Yes Fall with injury? Yes  
Number of falls in past 12 months 1 Fall Risk Score 2 Abuse Screening:  
 
Abuse Screening Questionnaire 1/15/2019 Do you ever feel afraid of your partner? Racquel Villela Are you in a relationship with someone who physically or mentally threatens you? Racquel Villela Is it safe for you to go home? Jazzmine Wilson Coordination of Care Questionaire: 1. Have you been to the ER, urgent care clinic since your last visit? Hospitalized since your last visit? NO 
 
2. Have you seen or consulted any other health care providers outside of the [address] [place] [address] since your last visit? Include any pap smears or colon screening. YES- Gastroenterology Advanced Directive: 1. Do you have an Advanced Directive? YES 
 
2. Would you like information on Advanced Directives?  NO

## 2020-03-04 NOTE — PATIENT INSTRUCTIONS
Low Sodium Diet (2,000 Milligram): Care Instructions Your Care Instructions Too much sodium causes your body to hold on to extra water. This can raise your blood pressure and force your heart and kidneys to work harder. In very serious cases, this could cause you to be put in the hospital. It might even be life-threatening. By limiting sodium, you will feel better and lower your risk of serious problems. The most common source of sodium is salt. People get most of the salt in their diet from canned, prepared, and packaged foods. Fast food and restaurant meals also are very high in sodium. Your doctor will probably limit your sodium to less than 2,000 milligrams (mg) a day. This limit counts all the sodium in prepared and packaged foods and any salt you add to your food. Follow-up care is a key part of your treatment and safety. Be sure to make and go to all appointments, and call your doctor if you are having problems. It's also a good idea to know your test results and keep a list of the medicines you take. How can you care for yourself at home? Read food labels · Read labels on cans and food packages. The labels tell you how much sodium is in each serving. Make sure that you look at the serving size. If you eat more than the serving size, you have eaten more sodium. · Food labels also tell you the Percent Daily Value for sodium. Choose products with low Percent Daily Values for sodium. · Be aware that sodium can come in forms other than salt, including monosodium glutamate (MSG), sodium citrate, and sodium bicarbonate (baking soda). MSG is often added to Asian food. When you eat out, you can sometimes ask for food without MSG or added salt. Buy low-sodium foods · Buy foods that are labeled \"unsalted\" (no salt added), \"sodium-free\" (less than 5 mg of sodium per serving), or \"low-sodium\" (less than 140 mg of sodium per serving).  Foods labeled \"reduced-sodium\" and \"light sodium\" may still have too much sodium. Be sure to read the label to see how much sodium you are getting. · Buy fresh vegetables, or frozen vegetables without added sauces. Buy low-sodium versions of canned vegetables, soups, and other canned goods. Prepare low-sodium meals · Cut back on the amount of salt you use in cooking. This will help you adjust to the taste. Do not add salt after cooking. One teaspoon of salt has about 2,300 mg of sodium. · Take the salt shaker off the table. · Flavor your food with garlic, lemon juice, onion, vinegar, herbs, and spices. Do not use soy sauce, lite soy sauce, steak sauce, onion salt, garlic salt, celery salt, mustard, or ketchup on your food. · Use low-sodium salad dressings, sauces, and ketchup. Or make your own salad dressings and sauces without adding salt. · Use less salt (or none) when recipes call for it. You can often use half the salt a recipe calls for without losing flavor. Other foods such as rice, pasta, and grains do not need added salt. · Rinse canned vegetables, and cook them in fresh water. This removes somebut not allof the salt. · Avoid water that is naturally high in sodium or that has been treated with water softeners, which add sodium. Call your local water company to find out the sodium content of your water supply. If you buy bottled water, read the label and choose a sodium-free brand. Avoid high-sodium foods · Avoid eating: 
? Smoked, cured, salted, and canned meat, fish, and poultry. ? Ham, burnette, hot dogs, and luncheon meats. ? Regular, hard, and processed cheese and regular peanut butter. ? Crackers with salted tops, and other salted snack foods such as pretzels, chips, and salted popcorn. ? Frozen prepared meals, unless labeled low-sodium. ? Canned and dried soups, broths, and bouillon, unless labeled sodium-free or low-sodium. ? Canned vegetables, unless labeled sodium-free or low-sodium. ? Darlene Maria D fries, pizza, tacos, and other fast foods. ? Pickles, olives, ketchup, and other condiments, especially soy sauce, unless labeled sodium-free or low-sodium. Where can you learn more? Go to http://bertha-shayla.info/. Enter C597 in the search box to learn more about \"Low Sodium Diet (2,000 Milligram): Care Instructions. \" Current as of: November 7, 2018 Content Version: 12.2 © 1098-9079 [employer]. Care instructions adapted under license by [employer] (which disclaims liability or warranty for this information). If you have questions about a medical condition or this instruction, always ask your healthcare professional. [address] any warranty or liability for your use of this information. How to Read a Food Label to Limit Sodium: Care Instructions Your Care Instructions Sodium causes your body to hold on to extra water. This can raise your blood pressure and force your heart and kidneys to work harder. In very serious cases, this could cause you to be put in the hospital. It might even be life-threatening. By limiting sodium, you will feel better and lower your risk of serious problems. Processed foods, fast food, and restaurant foods are the major sources of dietary sodium. The most common name for sodium is salt. Try to limit how much sodium you eat to less than 2,300 milligrams (mg) a day. If you limit your sodium to 1,500 mg a day, you can lower your blood pressure even more. This limit counts all the salt that you eat in foods you cook or in packaged foods. Keep a list of everything you eat and drink. Follow-up care is a key part of your treatment and safety. Be sure to make and go to all appointments, and call your doctor if you are having problems. It's also a good idea to know your test results and keep a list of the medicines you take. How can you care for yourself at home? Read ingredient lists on food labels · Read the list of ingredients on food labels to help you find how much sodium is in a food. The label lists the ingredients in a food in descending order (from the most to the least). If salt or sodium is high on the list, there may be a lot of sodium in the food. · Know that sodium has different names. Sodium is also called monosodium glutamate (MSG, common in [facility] food), sodium citrate, sodium alginate, sodium hydroxide, and sodium phosphate. Read Nutrition Facts labels · On most foods, there is a Nutrition Facts label. This will tell you how much sodium is in one serving of food. Look at both the serving size and the sodium amount. The serving size is located at the top of the label, usually right under the \"Nutrition Facts\" title. The amount of sodium is given in the list under the title. It is given in milligrams (mg). ? Check the serving size carefully. A single serving is often very small, and you may eat more than one serving. If this is the case, you will eat more sodium than listed on the label. For example, if the serving size for a canned soup is 1 cup and the sodium amount is 470 mg, if you have 2 cups you will eat 940 mg of sodium. · The nutrition facts for fresh fruits and vegetables are not listed on the food. They may be listed somewhere in the store. These foods usually have no sodium or low sodium. · The Nutrition Facts label also gives you the Percent Daily Value for sodium. This is how much of the recommended amount of sodium a serving contains. The daily value for sodium is less than 2,300 mg. So if the Percent Daily Value says 50%, this means one serving is giving you half of this, or 1,150 mg. Buy low-sodium foods · Look for foods that are made with less sodium. Watch for the following words on the label. ? \"Unsalted\" means there is no sodium added to the food. But there may be sodium already in the food naturally. ? \"Sodium-free\" means a serving has less than 5 mg of sodium. ? \"Very low sodium\" means a serving has 35 mg or less of sodium. ? \"Low-sodium\" means a serving has 140 mg or less of sodium. · \"Reduced-sodium\" means that there is 25% less sodium than what the food normally has. This is still usually too much sodium. Try not to buy foods with this on the label. · Buy fresh vegetables, or frozen vegetables without added sauces. Buy low-sodium versions of canned vegetables, soups, and other canned goods. Where can you learn more? Go to http://berthaSugarCRMshayla.info/. Enter 26 329893 in the search box to learn more about \"How to Read a Food Label to Limit Sodium: Care Instructions. \" Current as of: November 7, 2018 Content Version: 12.2 © 7451-4475 LoginRadius. Care instructions adapted under license by [employer] (which disclaims liability or warranty for this information). If you have questions about a medical condition or this instruction, always ask your healthcare professional. [place] [address] any warranty or liability for your use of this information. Leg and Ankle Edema: Care Instructions Your Care Instructions Swelling in the legs, ankles, and feet is called edema. It is common after you sit or stand for a while. Long plane flights or car rides often cause swelling in the legs and feet. You may also have swelling if you have to stand for long periods of time at your job. Problems with the veins in the legs (varicose veins) and changes in hormones can also cause swelling. Sometimes the swelling in the ankles and feet is caused by a more serious problem, such as heart failure, infection, blood clots, or liver or kidney disease. Follow-up care is a key part of your treatment and safety. Be sure to make and go to all appointments, and call your doctor if you are having problems. It's also a good idea to know your test results and keep a list of the medicines you take. How can you care for yourself at home? · If your doctor gave you medicine, take it as prescribed. Call your doctor if you think you are having a problem with your medicine. · Whenever you are resting, raise your legs up. Try to keep the swollen area higher than the level of your heart. · Take breaks from standing or sitting in one position. ? Walk around to increase the blood flow in your lower legs. ? Move your feet and ankles often while you stand, or tighten and relax your leg muscles. · Wear support stockings. Put them on in the morning, before swelling gets worse. · Eat a balanced diet. Lose weight if you need to. · Limit the amount of salt (sodium) in your diet. Salt holds fluid in the body and may increase swelling. When should you call for help? Call 911 anytime you think you may need emergency care. For example, call if: 
  · You have symptoms of a blood clot in your lung (called a pulmonary embolism). These may include: 
? Sudden chest pain. ? Trouble breathing. ? Coughing up blood.  
 Call your doctor now or seek immediate medical care if: 
  · You have signs of a blood clot, such as: 
? Pain in your calf, back of the knee, thigh, or groin. ? Redness and swelling in your leg or groin.  
  · You have symptoms of infection, such as: 
? Increased pain, swelling, warmth, or redness. ? Red streaks or pus. ? A fever.  
 Watch closely for changes in your health, and be sure to contact your doctor if: 
  · Your swelling is getting worse.  
  · You have new or worsening pain in your legs.  
  · You do not get better as expected. Where can you learn more? Go to http://bertha-shayla.info/. Enter B818 in the search box to learn more about \"Leg and Ankle Edema: Care Instructions. \" Current as of: June 26, 2019 Content Version: 12.2 © 1418-7357 Zachary Prell.  Care instructions adapted under license by [employer] (which disclaims liability or warranty for this information). If you have questions about a medical condition or this instruction, always ask your healthcare professional. [place] [address] any warranty or liability for your use of this information.

## 2020-03-04 NOTE — PROGRESS NOTES
HISTORY OF PRESENT ILLNESS Mine Meyers is a 77 y.o. female. HPI 
C/o B/L shins swelling, started about 2 weeks ago, worse in the afternoon, better in the morning, no recent trauma, no rash HTN, not controlled, bp is elevated, she is taking her meds daily Hypokalemia, not controlled, recent k was low, and her dose of Klor con was increased to 2 tablets daily, but she did not get the new Rx, and she has been taking only one daily Asthma, stable, no wheezing or sob, no cough Review of Systems Constitutional: Negative for chills and fever. Respiratory: Negative for cough and shortness of breath. Cardiovascular: Negative for chest pain, palpitations, orthopnea and claudication. Gastrointestinal: Negative for abdominal pain and nausea. Skin: Negative for rash. Neurological: Negative for dizziness and headaches. Physical Exam 
Vitals signs reviewed. Cardiovascular:  
   Rate and Rhythm: Normal rate and regular rhythm. Pulses: Normal pulses. Heart sounds: Normal heart sounds. No murmur. Pulmonary:  
   Effort: Pulmonary effort is normal. No respiratory distress. Breath sounds: Normal breath sounds. No wheezing. Abdominal:  
   Palpations: Abdomen is soft. Tenderness: There is no abdominal tenderness. Musculoskeletal:  
   Right lower leg: Edema (1 plus pitting edema, shin) present. Left lower leg: Edema (1 plus pitting edema, shin.) present. Comments: No calves tenderness/erythema Neurological:  
   Mental Status: She is oriented to person, place, and time. ASSESSMENT and PLAN Diagnoses and all orders for this visit: 
 
1. Bilateral lower extremity edema 
-     chlorthalidone (HYGROTEN) 50 mg tablet; Take 1 Tab by mouth every morning. 
-     XR CHEST PA LAT; Future Keep legs elevated, use support knee high stockings during the day 2. Essential hypertension, not controlled -     chlorthalidone (HYGROTEN) 50 mg tablet; Take 1 Tab by mouth every morning. 3. Moderate persistent asthma without complication, stable -     XR CHEST PA LAT; Future 4. Hypokalemia, not controlled, advised pt to increase her Klor con to 2 tabs daily -     METABOLIC PANEL, BASIC; Future 
 
rtc 3 wks

## 2020-03-06 NOTE — PROGRESS NOTES
Potassium is ok, continue Klor con tablets, 2 daily as prescribed by Dr Slim Wall Random glucose is ok @ 182

## 2020-04-03 NOTE — TELEPHONE ENCOUNTER
Requested Prescriptions     Pending Prescriptions Disp Refills    potassium chloride (KLOR-CON) 10 mEq tablet 180 Tab 1     Sig: Take 2 Tabs by mouth daily.  gabapentin (NEURONTIN) 100 mg capsule 270 Cap 1     Sig: Take 1 Cap by mouth three (3) times daily.  levothyroxine (SYNTHROID) 50 mcg tablet 90 Tab 1     Sig: Take 1 Tab by mouth Daily (before breakfast). Pt called to request a refill.      Future Appointments   Date Time Provider Davion Waters   6/2/2020 11:30 AM Malia Mejia MD [facility]

## 2020-04-03 NOTE — TELEPHONE ENCOUNTER
Potassium last ordered 01/22/2020 qty 180 with 1 refill. Patient has a refill on this medication. Gabapentin last ordered 01/16/2020 qty 270 with 1 refill. Patient has a refill on this medication. Synthroid last ordered 12/30/19 qty 90 with 1 refill. Patient has a refill on this medication. Patient has refills for all medications that she requested. Please call her back and let her know that she needs to contact her pharmacy for these refills.

## 2020-04-06 NOTE — TELEPHONE ENCOUNTER
Pt is requesting for the rx to be sent to a different pharmacy that does not have previous records on her because it is not a local pharmacy. The [facility] is [facility] and she is currently living w/ her family in [place] [address].

## 2020-04-08 NOTE — TELEPHONE ENCOUNTER
Albuterol last ordered 09/12/19 qty 2 inhalers with 2 refills. Synthroid last ordered 04/06/2020 qty 90 with 0 refills. Refill not needed at this time. Januvia last ordered 12/30/19 qty 90 with 1 refill. Klor-con last ordered 04/06/2020 qty 180 with 0 refills. Refill not needed at this time. Metformin last ordered 01/08/2020 qty 180 with 1 refill. Was sent to [facility] pharmacy that is currently closed, patient requesting refills be sent to [facility].      Last appt 03/04/2020  Next scheduled 06/02/2020

## 2020-04-08 NOTE — TELEPHONE ENCOUNTER
Requested Prescriptions     Pending Prescriptions Disp Refills    albuterol (PROVENTIL HFA, VENTOLIN HFA, PROAIR HFA) 90 mcg/actuation inhaler 2 Inhaler 2     Sig: Take 2 Puffs by inhalation every four (4) hours as needed for Wheezing.  levothyroxine (SYNTHROID) 50 mcg tablet 90 Tab 0     Sig: Take 1 Tab by mouth Daily (before breakfast).  SITagliptin (JANUVIA) 100 mg tablet 90 Tab 1     Sig: Take 1 Tab by mouth daily.  potassium chloride (KLOR-CON) 10 mEq tablet 180 Tab 0     Sig: Take 2 Tabs by mouth daily.  metFORMIN (GLUCOPHAGE) 1,000 mg tablet 180 Tab 1     Sig: Take 1 Tab by mouth two (2) times daily (with meals).

## 2020-05-06 NOTE — PROGRESS NOTES
Elvira Mas is a 72 y.o. female who was seen by synchronous (real-time) audio-video technology on 5/6/2020. Consent: Elvira Mas, who was seen by synchronous (real-time) audio-video technology, and/or her healthcare decision maker, is aware that this patient-initiated, Telehealth encounter on 5/6/2020 is a billable service, with coverage as determined by her insurance carrier. She is aware that she may receive a bill and has provided verbal consent to proceed: Yes. Assessment & Plan:  
Diagnoses and all orders for this visit: 
 
1. Chronic nausea -     promethazine (PHENERGAN) 25 mg tablet; Take 1 Tab by mouth every eight (8) hours as needed for Nausea. Indications: chronic nausea 2. Gastroesophageal reflux disease with esophagitis Other orders 
-     fluticasone propion-salmeteroL (ADVAIR/WIXELA) 250-50 mcg/dose diskus inhaler; Take 1 Puff by inhalation every twelve (12) hours. Will switch her to Phenergan and see how this helps her. Pt has an appt with us in June. [address] Subjective: Elvira Mas is a 72 y.o. female who was seen for Nausea Pt was seen on a virtual visit today. She has continued to have issues with her nausea. In the past when she had symptoms, she was taking Zofran and was using this up until recently. But now says that the Zofran really did not work as well as she wanted. She does see Dr. April Sharpe for her acid reflux and had an EGD on Feb 20th. She was having some issue with food getting in her throat. Prior to Admission medications Medication Sig Start Date End Date Taking? Authorizing Provider  
promethazine (PHENERGAN) 25 mg tablet Take 1 Tab by mouth every eight (8) hours as needed for Nausea. Indications: chronic nausea 5/6/20  Yes Brenton aMldonado MD  
fluticasone propion-salmeteroL (ADVAIR/WIXELA) 250-50 mcg/dose diskus inhaler Take 1 Puff by inhalation every twelve (12) hours.  5/6/20  Yes Brenton Maldonado MD  
 albuterol (PROVENTIL HFA, VENTOLIN HFA, PROAIR HFA) 90 mcg/actuation inhaler Take 2 Puffs by inhalation every four (4) hours as needed for Wheezing. 4/9/20   Agnes Mendoza MD  
SITagliptin (JANUVIA) 100 mg tablet Take 1 Tab by mouth daily. 4/9/20   Agnes Mendoza MD  
metFORMIN (GLUCOPHAGE) 1,000 mg tablet Take 1 Tab by mouth two (2) times daily (with meals). 4/9/20   Agnes Mendoza MD  
potassium chloride (KLOR-CON) 10 mEq tablet Take 2 Tabs by mouth daily. 4/6/20   Agnes Mendoza MD  
gabapentin (NEURONTIN) 100 mg capsule Take 1 Cap by mouth three (3) times daily. 4/6/20   Agnes Mendoza MD  
levothyroxine (SYNTHROID) 50 mcg tablet Take 1 Tab by mouth Daily (before breakfast). 4/6/20   Agnes Mendoza MD  
chlorthalidone (HYGROTEN) 50 mg tablet Take 1 Tab by mouth every morning. 3/4/20   Richie Terry MD  
docusate sodium (COLACE) 100 mg capsule Take 1 Cap by mouth two (2) times a day. 1/16/20   Agnes Mendoza MD  
propranolol LA (INDERAL LA) 60 mg SR capsule Take 1 Cap by mouth daily. Indications: migraine prevention 1/16/20   Agnes Mendzoa MD  
meclizine (ANTIVERT) 25 mg tablet Take 1 Tab by mouth three (3) times daily as needed for Dizziness. Indications: sensation of spinning or whirling 1/16/20   Agnes Mendoza MD  
atorvastatin (LIPITOR) 10 mg tablet Take 1 Tab by mouth daily. 1/16/20   Agnes Mendoza MD  
glipiZIDE (GLUCOTROL) 5 mg tablet Take 1 Tab by mouth two (2) times a day. 1/16/20   Agnes Mendoza MD  
lisinopril (PRINIVIL, ZESTRIL) 40 mg tablet Take 1 Tab by mouth daily. 1/16/20   Agnes Mendoza MD  
fenofibrate nanocrystallized (TRICOR) 48 mg tablet Take 1 Tab by mouth daily. 1/8/20   Agnes Mendoza MD  
hydrocortisone (ANUSOL-HC) 2.5 % rectal cream Insert  into rectum four (4) times daily. 12/11/19   Agnes Mendoza MD  
polyethylene glycol [facility]) 17 gram packet Take 1 Packet by mouth daily.  12/11/19   Agnes Mendoza MD  
 phenylephrine 0.25 % suppository Insert 1 Suppository into rectum two (2) times a day. 12/11/19   Brenton Maldonado MD  
alendronate (FOSAMAX) 70 mg tablet Take 1 Tab by mouth every seven (7) days. 9/12/19   Brenton Maldonado MD  
fluticasone propionate (FLONASE) 50 mcg/actuation nasal spray 2 Sprays by Both Nostrils route daily. 9/12/19   Brenton Maldonado MD  
PARoxetine (PAXIL) 40 mg tablet Take 1 Tab by mouth daily. 9/12/19   Brenton Maldonado MD  
fluticasone propion-salmeterol (ADVAIR/WIXELA) 250-50 mcg/dose diskus inhaler Take 1 Puff by inhalation every twelve (12) hours. 7/22/19 5/6/20  Brenton Maldonado MD  
cetirizine (ZYRTEC) 10 mg tablet Take 1 Tab by mouth daily. 7/9/19   Brenton Maldonado MD  
omeprazole (PRILOSEC) 40 mg capsule Take 40 mg by mouth daily. 5/3/19   Provider, Historical  
garlic 4,130 mg cap Take  by mouth. Provider, Historical  
ginger, Zingiber officinalis, (GINGER EXTRACT) 250 mg cap Take  by mouth. Provider, Historical  
Omega-3-DHA-EPA-Fish Oil 1,000 mg (120 mg-180 mg) cap Take  by mouth daily. Provider, Historical  
 
Allergies Allergen Reactions  Codeine Nausea and Vomiting Patient Active Problem List  
Diagnosis Code  Hypertension I10  
 Diabetes ([facility] [address] E11.9  GERD (gastroesophageal reflux disease) K21.9  Depression F32.9  
 HLD (hyperlipidemia) E78.5  Acquired hypothyroidism E03.9  Benign positional vertigo H81.10  Asthma J45.909  Sleep disturbance G47.9 Current Outpatient Medications Medication Sig Dispense Refill  promethazine (PHENERGAN) 25 mg tablet Take 1 Tab by mouth every eight (8) hours as needed for Nausea. Indications: chronic nausea 90 Tab 1  
 fluticasone propion-salmeteroL (ADVAIR/WIXELA) 250-50 mcg/dose diskus inhaler Take 1 Puff by inhalation every twelve (12) hours.  3 Inhaler 2  
 albuterol (PROVENTIL HFA, VENTOLIN HFA, PROAIR HFA) 90 mcg/actuation inhaler Take 2 Puffs by inhalation every four (4) hours as needed for Wheezing. 2 Inhaler 0  
 SITagliptin (JANUVIA) 100 mg tablet Take 1 Tab by mouth daily. 90 Tab 0  
 metFORMIN (GLUCOPHAGE) 1,000 mg tablet Take 1 Tab by mouth two (2) times daily (with meals). 180 Tab 0  
 potassium chloride (KLOR-CON) 10 mEq tablet Take 2 Tabs by mouth daily. 180 Tab 0  
 gabapentin (NEURONTIN) 100 mg capsule Take 1 Cap by mouth three (3) times daily. 270 Cap 0  
 levothyroxine (SYNTHROID) 50 mcg tablet Take 1 Tab by mouth Daily (before breakfast). 90 Tab 0  chlorthalidone (HYGROTEN) 50 mg tablet Take 1 Tab by mouth every morning. 90 Tab 0  
 docusate sodium (COLACE) 100 mg capsule Take 1 Cap by mouth two (2) times a day. 180 Cap 1  propranolol LA (INDERAL LA) 60 mg SR capsule Take 1 Cap by mouth daily. Indications: migraine prevention 90 Cap 1  
 meclizine (ANTIVERT) 25 mg tablet Take 1 Tab by mouth three (3) times daily as needed for Dizziness. Indications: sensation of spinning or whirling 60 Tab 1  
 atorvastatin (LIPITOR) 10 mg tablet Take 1 Tab by mouth daily. 90 Tab 1  
 glipiZIDE (GLUCOTROL) 5 mg tablet Take 1 Tab by mouth two (2) times a day. 180 Tab 1  
 lisinopril (PRINIVIL, ZESTRIL) 40 mg tablet Take 1 Tab by mouth daily. 90 Tab 1  
 fenofibrate nanocrystallized (TRICOR) 48 mg tablet Take 1 Tab by mouth daily. 90 Tab 1  
 hydrocortisone (ANUSOL-HC) 2.5 % rectal cream Insert  into rectum four (4) times daily. 30 g 2  polyethylene glycol (MIRALAX) 17 gram packet Take 1 Packet by mouth daily. 90 Packet 2  phenylephrine 0.25 % suppository Insert 1 Suppository into rectum two (2) times a day. 60 Suppository 2  
 alendronate (FOSAMAX) 70 mg tablet Take 1 Tab by mouth every seven (7) days. 12 Tab 2  
 fluticasone propionate (FLONASE) 50 mcg/actuation nasal spray 2 Sprays by Both Nostrils route daily. 1 Bottle 2  
 PARoxetine (PAXIL) 40 mg tablet Take 1 Tab by mouth daily. 90 Tab 2  cetirizine (ZYRTEC) 10 mg tablet Take 1 Tab by mouth daily.  90 Tab 0  
  omeprazole (PRILOSEC) 40 mg capsule Take 40 mg by mouth daily.  garlic 6,615 mg cap Take  by mouth.  willie, Zingiber officinalis, (WILLIE EXTRACT) 250 mg cap Take  by mouth.  Omega-3-DHA-EPA-Fish Oil 1,000 mg (120 mg-180 mg) cap Take  by mouth daily. Allergies Allergen Reactions  Codeine Nausea and Vomiting Past Medical History:  
Diagnosis Date  Acquired hypothyroidism 2016  Asthma 2016  Benign positional vertigo 2016  Depression  Diabetes ([facility] [address]  Diabetic neuropathy ([facility] [address] feet  Dizziness  GERD (gastroesophageal reflux disease) Had EGD 2020 with Dr. Abigail Mcdonald. showed chronic inflammation form acid reflux. no Nguyen's.  HLD (hyperlipidemia) 2016  Hypercholesterolemia  Hypertension  Sleep disturbance 2016  Thyroid disease Past Surgical History:  
Procedure Laterality Date  HX APPENDECTOMY  2016 Dr. Terrell Varela  HX  SECTION    
 HX CHOLECYSTECTOMY  HX COLONOSCOPY  2016 Repeat colonoscopy in 5 years per Dr. Ayaka Muñoz; last 16  HX HERNIA REPAIR  2017 Dr. Conchita Gee. Review of Systems Gastrointestinal: Positive for nausea. All other systems reviewed and are negative. Objective: There were no vitals taken for this visit. General: alert, cooperative, no distress Mental  status: normal mood, behavior, speech, dress, motor activity, and thought processes, able to follow commands HENT: NCAT Neck: no visualized mass Resp: no respiratory distress Neuro: no gross deficits Skin: no discoloration or lesions of concern on visible areas Psychiatric: normal affect, consistent with stated mood, no evidence of hallucinations Additional exam findings: We discussed the expected course, resolution and complications of the diagnosis(es) in detail.   Medication risks, benefits, costs, interactions, and alternatives were discussed as indicated. I advised her to contact the office if her condition worsens, changes or fails to improve as anticipated. She expressed understanding with the diagnosis(es) and plan. Abbie Smith is a 72 y.o. female who was evaluated by a video visit encounter for concerns as above. Patient identification was verified prior to start of the visit. A caregiver was present when appropriate. Due to this being a TeleHealth encounter ([facility]), evaluation of the following organ systems was limited: Vitals/Constitutional/EENT/Resp/CV/GI//MS/Neuro/Skin/Heme-Lymph-Imm. Pursuant to the emergency declaration under the [facility], [facility]5 waiver authority and the [employer] Act, this Virtual  Visit was conducted, with patient's (and/or legal guardian's) consent, to reduce the patient's risk of exposure to COVID-19 and provide necessary medical care. Services were provided through a video synchronous discussion virtually to substitute for in-person clinic visit. Patient and provider were located at their individual homes.  
 
 
Kristen Alvarado MD

## 2020-06-02 PROBLEM — E11.40 TYPE 2 DIABETES MELLITUS WITH DIABETIC NEUROPATHY (HCC): Status: ACTIVE | Noted: 2020-01-01

## 2020-06-02 NOTE — PROGRESS NOTES
Assessment/Plan: *Diagnoses and all orders for this visit: 
 
1. Gastroesophageal reflux disease with esophagitis 
-     omeprazole (PRILOSEC) 40 mg capsule; Take 1 Cap by mouth daily. 2. Acquired hypothyroidism 3. Type 2 diabetes mellitus with diabetic polyneuropathy, without long-term current use of insulin ([facility]) 
-     SITagliptin (JANUVIA) 100 mg tablet; Take 1 Tab by mouth daily. 
-     AMB POC HEMOGLOBIN A1C 
 
4. Essential hypertension 5. Pure hypercholesterolemia 6. Type 2 diabetes mellitus with diabetic neuropathy, without long-term current use of insulin ([facility] [address] 7. Other chest pain -     AMB POC EKG ROUTINE W/ 12 LEADS, INTER & REP 
 
 
 
EKG was NSR. Unlikely that it is cardiac in nature but could be her uncontrolled reflux. Will start omeprazole again. Will f/u in 3 mon for DM. Repeat A1c in office. The plan was discussed with the patient. The patient verbalized understanding and is in agreement with the plan. All medication potential side effects were discussed with the patient. 
 
------------------------------------------------------------------------------------------------------------------- Angela Kern is a 72 y.o. female and presents with Diabetes; Rib Pain (mostly on left side . ); and Chest Pain Subjective: 
Pt here for f/u. DM:  Not well controlled, A1c has increased to 8.9%!! She has not been following her diet. HTN:  Well controlled. Mentions some period chest pain that she has, is short lived. Has hx of reflux and on discussing this, realizes that she has not been taking her omeprazole recently. HLD:  Stable and controlled. Hypothyroidism: asymptomatic. Review of Systems - General ROS: negative The problem list was updated as a part of today's visit. Patient Active Problem List  
Diagnosis Code  Hypertension I10  
 Diabetes ([facility] [address] E11.9  GERD (gastroesophageal reflux disease) K21.9  Depression F32.9  
 HLD (hyperlipidemia) E78.5  Acquired hypothyroidism E03.9  Benign positional vertigo H81.10  Asthma J45.909  Sleep disturbance G47.9  Type 2 diabetes mellitus with diabetic neuropathy (HCC) E11.40 The PSH, FH were reviewed. SH: Social History Tobacco Use  Smoking status: Never Smoker  Smokeless tobacco: Never Used Substance Use Topics  Alcohol use: Yes  Drug use: No  
 
 
Medications/Allergies: 
Current Outpatient Medications on File Prior to Visit Medication Sig Dispense Refill  PARoxetine (PAXIL) 40 mg tablet Take 1 Tab by mouth daily. 90 Tab 1  
 levothyroxine (SYNTHROID) 50 mcg tablet Take 1 Tab by mouth Daily (before breakfast). 90 Tab 1  propranolol LA (INDERAL LA) 60 mg SR capsule Take 1 Cap by mouth daily. Indications: migraine prevention 90 Cap 1  
 fluticasone propion-salmeteroL (ADVAIR/WIXELA) 250-50 mcg/dose diskus inhaler Take 1 Puff by inhalation every twelve (12) hours. 3 Inhaler 2  
 albuterol (PROVENTIL HFA, VENTOLIN HFA, PROAIR HFA) 90 mcg/actuation inhaler Take 2 Puffs by inhalation every four (4) hours as needed for Wheezing. 2 Inhaler 0  
 metFORMIN (GLUCOPHAGE) 1,000 mg tablet Take 1 Tab by mouth two (2) times daily (with meals). 180 Tab 0  
 potassium chloride (KLOR-CON) 10 mEq tablet Take 2 Tabs by mouth daily. 180 Tab 0  
 gabapentin (NEURONTIN) 100 mg capsule Take 1 Cap by mouth three (3) times daily. 270 Cap 0  chlorthalidone (HYGROTEN) 50 mg tablet Take 1 Tab by mouth every morning. 90 Tab 0  
 docusate sodium (COLACE) 100 mg capsule Take 1 Cap by mouth two (2) times a day. 180 Cap 1  
 meclizine (ANTIVERT) 25 mg tablet Take 1 Tab by mouth three (3) times daily as needed for Dizziness. Indications: sensation of spinning or whirling 60 Tab 1  
 atorvastatin (LIPITOR) 10 mg tablet Take 1 Tab by mouth daily.  90 Tab 1  
 glipiZIDE (GLUCOTROL) 5 mg tablet Take 1 Tab by mouth two (2) times a day. 180 Tab 1  
 lisinopril (PRINIVIL, ZESTRIL) 40 mg tablet Take 1 Tab by mouth daily. 90 Tab 1  
 fenofibrate nanocrystallized (TRICOR) 48 mg tablet Take 1 Tab by mouth daily. 90 Tab 1  
 hydrocortisone (ANUSOL-HC) 2.5 % rectal cream Insert  into rectum four (4) times daily. 30 g 2  polyethylene glycol (MIRALAX) 17 gram packet Take 1 Packet by mouth daily. 90 Packet 2  phenylephrine 0.25 % suppository Insert 1 Suppository into rectum two (2) times a day. 60 Suppository 2  
 alendronate (FOSAMAX) 70 mg tablet Take 1 Tab by mouth every seven (7) days. 12 Tab 2  
 fluticasone propionate (FLONASE) 50 mcg/actuation nasal spray 2 Sprays by Both Nostrils route daily. 1 Bottle 2  
 cetirizine (ZYRTEC) 10 mg tablet Take 1 Tab by mouth daily. 90 Tab 0  
 garlic 6,606 mg cap Take  by mouth.  ginger, Zingiber officinalis, (GINGER EXTRACT) 250 mg cap Take  by mouth.  Omega-3-DHA-EPA-Fish Oil 1,000 mg (120 mg-180 mg) cap Take  by mouth daily.  promethazine (PHENERGAN) 25 mg tablet Take 1 Tab by mouth every eight (8) hours as needed for Nausea. Indications: chronic nausea 90 Tab 1  [DISCONTINUED] SITagliptin (JANUVIA) 100 mg tablet Take 1 Tab by mouth daily. 90 Tab 0  [DISCONTINUED] omeprazole (PRILOSEC) 40 mg capsule Take 40 mg by mouth daily. No current facility-administered medications on file prior to visit. Allergies Allergen Reactions  Codeine Nausea and Vomiting Health Maintenance:  
Health Maintenance Topic Date Due  Shingrix Vaccine Age 50> (1 of 2) 04/05/2001  Pneumococcal 65+ years (2 of 2 - PPSV23) 01/15/2020  GLAUCOMA SCREENING Q2Y  04/17/2020  Eye Exam Retinal or Dilated  04/17/2020  Influenza Age 5 to Adult  08/01/2020  
 DTaP/Tdap/Td series (2 - Td) 08/11/2020  Foot Exam Q1  09/12/2020  Medicare Yearly Exam  01/16/2021  A1C test (Diabetic or Prediabetic)  01/16/2021  MICROALBUMIN Q1  01/16/2021  Lipid Screen  01/16/2021  Colonoscopy  12/21/2021  Breast Cancer Screen Mammogram  02/04/2022  Hepatitis C Screening  Completed  Bone Densitometry (Dexa) Screening  Completed Objective: 
Visit Vitals /75 Pulse 70 Temp 97.3 °F (36.3 °C) (Oral) Resp 16 Ht 5' 1\" (1.549 m) Wt 134 lb (60.8 kg) SpO2 97% BMI 25.32 kg/m² Nurses notes and VS reviewed. Physical Examination: General appearance - alert, well appearing, and in no distress Chest - clear to auscultation, no wheezes, rales or rhonchi, symmetric air entry Heart - normal rate, regular rhythm, normal S1, S2, no murmurs, rubs, clicks or gallops Abdomen - soft, nontender, nondistended, no masses or organomegaly Labwork and Ancillary Studies: CBC w/Diff Lab Results Component Value Date/Time WBC 9.8 01/16/2020 01:55 PM  
 HGB 13.4 01/16/2020 01:55 PM  
 PLATELET 134 56/77/3157 01:55 PM  
  
 
 Basic Metabolic Profile Lab Results Component Value Date/Time Sodium 140 03/04/2020 11:40 AM  
 Potassium 3.7 03/04/2020 11:40 AM  
 Chloride 107 03/04/2020 11:40 AM  
 CO2 28 03/04/2020 11:40 AM  
 Anion gap 5 03/04/2020 11:40 AM  
 Glucose 182 (H) 03/04/2020 11:40 AM  
 BUN 9 03/04/2020 11:40 AM  
 Creatinine 0.66 03/04/2020 11:40 AM  
 BUN/Creatinine ratio 14 03/04/2020 11:40 AM  
 GFR est AA >60 03/04/2020 11:40 AM  
 GFR est non-AA >60 03/04/2020 11:40 AM  
 Calcium 8.6 03/04/2020 11:40 AM  
  
  
LFT Lab Results Component Value Date/Time ALT (SGPT) 19 01/16/2020 01:55 PM  
 Alk. phosphatase 58 01/16/2020 01:55 PM  
 Bilirubin, direct 0.2 01/16/2020 01:55 PM  
 Bilirubin, total 0.6 01/16/2020 01:55 PM  
 
 
 
Cholesterol Lab Results Component Value Date/Time  Cholesterol, total 150 01/16/2020 01:55 PM  
 HDL Cholesterol 39 (L) 01/16/2020 01:55 PM  
 LDL, calculated 82.4 01/16/2020 01:55 PM  
 Triglyceride 143 01/16/2020 01:55 PM  
 CHOL/HDL Ratio 3.8 01/16/2020 01:55 PM

## 2020-06-02 NOTE — PATIENT INSTRUCTIONS

## 2020-06-08 NOTE — TELEPHONE ENCOUNTER
Patient called because she fell on Saturday and hit her chest on the door knob . I asked patient if she was dizzy and this is why she fell . She said yes. I spoke with Dr Katrin Downey about this and she would like patient to address the Dizziness with Dr Aydee Nguyen Neuro who she see's for headaches. Patient told  to use  tylenol and ice for pain . She voiced understanding .

## 2020-06-11 NOTE — PROGRESS NOTES
Yoly Radford is a 72 y.o. female (: 1950) presenting to address: Chief Complaint Patient presents with  Rib Pain  
  left side . hit left side on door knob on saturday  Shoulder Pain  
  pain from side goes up into shoulder  Dizziness  
  referral was placed to Dr Duglas White office . Vitals:  
 20 1343 BP: 122/68 Pulse: 72 Resp: 16 Temp: 97.5 °F (36.4 °C) TempSrc: Oral  
SpO2: 97% Weight: 134 lb (60.8 kg) Height: 5' 1\" (1.549 m) PainSc:   7 PainLoc: Rib Cage Hearing/Vision: No exam data present Learning Assessment:  
 
Learning Assessment 2016 PRIMARY LEARNER Patient HIGHEST LEVEL OF EDUCATION - PRIMARY LEARNER  SOME COLLEGE PRIMARY LANGUAGE ENGLISH  
LEARNER PREFERENCE PRIMARY DEMONSTRATION  
ANSWERED BY patient RELATIONSHIP SELF Depression Screening:  
 
3 most recent PHQ Screens 1/15/2019 PHQ Not Done - Little interest or pleasure in doing things Not at all Feeling down, depressed, irritable, or hopeless Not at all Total Score PHQ 2 0 Fall Risk Assessment:  
 
Fall Risk Assessment, last 12 mths 2020 Able to walk? Yes Fall in past 12 months? Yes Fall with injury? Yes  
Number of falls in past 12 months 1 Fall Risk Score 2 Abuse Screening:  
 
Abuse Screening Questionnaire 1/15/2019 Do you ever feel afraid of your partner? Victor Manuelhine Kendra Are you in a relationship with someone who physically or mentally threatens you? Isadora Kendra Is it safe for you to go home? Suri Melendez Coordination of Care Questionaire: 1. Have you been to the ER, urgent care clinic since your last visit? Hospitalized since your last visit? NO 
 
2. Have you seen or consulted any other health care providers outside of the [address] [place] [address] since your last visit? Include any pap smears or colon screening. NO Advanced Directive: 1. Do you have an Advanced Directive? NO 
 
2. Would you like information on Advanced Directives?  NO

## 2020-06-16 NOTE — TELEPHONE ENCOUNTER
Please notify pt that her ribs are fine. No fractures were seen. I do want to get an echo because the heart looked slightly larger on Xray. Order placed.

## 2020-06-23 NOTE — TELEPHONE ENCOUNTER
Patient notified of results and need for echo . She voiced understanding and I have her number to central scheduling for wm .

## 2020-06-29 NOTE — TELEPHONE ENCOUNTER
Requested Prescriptions     Pending Prescriptions Disp Refills    gabapentin (NEURONTIN) 100 mg capsule 270 Cap 0     Sig: Take 1 Cap by mouth three (3) times daily.  naproxen (NAPROSYN) 500 mg tablet 30 Tab 0     Sig: Take 1 Tab by mouth two (2) times daily (with meals).  potassium chloride (KLOR-CON) 10 mEq tablet 180 Tab 0     Sig: Take 2 Tabs by mouth daily.  meclizine (ANTIVERT) 25 mg tablet 60 Tab 1     Sig: Take 1 Tab by mouth three (3) times daily as needed for Dizziness. Indications: sensation of spinning or whirling    levothyroxine (SYNTHROID) 50 mcg tablet 90 Tab 1     Sig: Take 1 Tab by mouth Daily (before breakfast).  glipiZIDE (GLUCOTROL) 5 mg tablet 180 Tab 1     Sig: Take 1 Tab by mouth two (2) times a day.  fluticasone propion-salmeteroL (ADVAIR/WIXELA) 250-50 mcg/dose diskus inhaler 3 Inhaler 2     Sig: Take 1 Puff by inhalation every twelve (12) hours.  fenofibrate nanocrystallized (TRICOR) 48 mg tablet 90 Tab 1     Sig: Take 1 Tab by mouth daily.

## 2020-07-01 NOTE — TELEPHONE ENCOUNTER
Last OV 6/11/20 . Gabapentin , potassium last refilled 4/6/20 0 refills. Meclizine 1/16/20 60 with 1 refill . Glipizide 1/16/20 for 180 with 1 refill . Flonase 9/12/20191 with 2 refills  . Tricor 1/8/20 for 90 with 1 . 6/11/20 for 30 tabs  0 refills . levothyroxine 5/15/20 for 90 with 1 No future appointments.

## 2020-07-21 NOTE — TELEPHONE ENCOUNTER
Patient had an appointment today with Dereje Yee and she missed her appointment. When she called to reschedule they told her that she needed to call her PCP to reschedule. I am not sure if she is giving all the details but is requesting to speak to a nurse on why she missed the appointment.

## 2020-08-13 NOTE — TELEPHONE ENCOUNTER
Called patient back and informed her that the medication for dizziness is Antivert (meclizine) which was sent to the [facility] on 7/1/20

## 2020-08-13 NOTE — TELEPHONE ENCOUNTER
Potassium last refilled 7/1/20 for 90 . Paxil 5/15/20 for 90 with 1 . Omeprazole 6/2/20 for 90 with 1 . Patient should have refills / new script already for the previously listed  . Lisinopril 1/16/20 for 90 with 1 . Last OV 6/11/20 No future appointments.

## 2020-09-01 NOTE — TELEPHONE ENCOUNTER
Pt is calling because she is currently experiencing rectal bleeding for the past couple of days. Also pt stated that she had leg swollen in both legs.  Pt was offered a v.v appt but declined instead she would like an in-office appt

## 2020-09-01 NOTE — TELEPHONE ENCOUNTER
I spoke with Dr Zeke Bean about patients concerns okay to schedule in office . I called patient to let her know she has been scheduled for 9/2 at 11 am but there was no answer .

## 2020-09-02 NOTE — PROGRESS NOTES
Assessment/Plan: *Diagnoses and all orders for this visit: 1. Hemorrhoids, unspecified hemorrhoid type -     phenylephrine 0.25 % suppository; Insert 1 Suppository into rectum two (2) times a day. -     hydrocortisone (ANUSOL-HC) 2.5 % rectal cream; Insert  into rectum four (4) times daily. 
-     REFERRAL TO COLON AND RECTAL SURGERY 2. Dysphagia, unspecified type 
-     REFERRAL TO GASTROENTEROLOGY 3. Bilateral leg edema 4. Moderate persistent asthma without complication Other orders -     promethazine (PHENERGAN) 25 mg tablet; Take 1 Tab by mouth every eight (8) hours as needed for Nausea. Pt will get compression socks and wear them daily. She already is on a strong diuretic so will not increase meds. If not responding to this, will do a PVL. Will refer to specialists above. Advised to start taking her Advair the correct way, 1 puff twice daily NOT 2 puffs in evening. She will monitor her wheezing and notify me if not better. The plan was discussed with the patient. The patient verbalized understanding and is in agreement with the plan. All medication potential side effects were discussed with the patient. 
 
------------------------------------------------------------------------------------------------------------------- Lenny Garcia is a 72 y.o. female and presents with Leg Swelling (better today ); Breathing Problem (asthma ); and Rectal Bleeding (ran out of suppositories and cream ) Subjective: 
Pt seen for several issues. Leg edema, getting worse over the last 3 days. Has not been an issue in the past, denies any SOB. Legs look NL when she first wakes up. Having problems again with her hemorrhoids, they are bleeding and are painful. We talked about having them removed. Having issues with swallowing, feels like food is getting stuck. Takes Prilosec for GERD. At times, will cough while eating. Has noted issue with wheezing. Has Advair and uses it daily but reveals that she takes it aas 2 puffs once in the evening, which is not how she is supposed to take it. Review of Systems - General ROS: negative The problem list was updated as a part of today's visit. Patient Active Problem List  
Diagnosis Code  Hypertension I10  
 Diabetes ([facility] [address] E11.9  GERD (gastroesophageal reflux disease) K21.9  Depression F32.9  
 HLD (hyperlipidemia) E78.5  Acquired hypothyroidism E03.9  Benign positional vertigo H81.10  Asthma J45.909  Sleep disturbance G47.9  Type 2 diabetes mellitus with diabetic neuropathy (HCC) E11.40 The PSH, FH were reviewed. SH: Social History Tobacco Use  Smoking status: Never Smoker  Smokeless tobacco: Never Used Substance Use Topics  Alcohol use: Yes  Drug use: No  
 
 
Medications/Allergies: 
Current Outpatient Medications on File Prior to Visit Medication Sig Dispense Refill  lisinopriL (PRINIVIL, ZESTRIL) 40 mg tablet Take 1 Tab by mouth daily. 90 Tab 1  
 gabapentin (NEURONTIN) 100 mg capsule Take 1 Cap by mouth three (3) times daily. 270 Cap 1  
 naproxen (NAPROSYN) 500 mg tablet Take 1 Tab by mouth two (2) times daily as needed for Pain. 40 Tab 0  
 potassium chloride (KLOR-CON) 10 mEq tablet Take 2 Tabs by mouth daily. 180 Tab 1  
 meclizine (ANTIVERT) 25 mg tablet Take 1 Tab by mouth three (3) times daily as needed for Dizziness. Indications: sensation of spinning or whirling 60 Tab 1  
 glipiZIDE (GLUCOTROL) 5 mg tablet Take 1 Tab by mouth two (2) times a day. 180 Tab 1  
 fluticasone propion-salmeteroL (ADVAIR/WIXELA) 250-50 mcg/dose diskus inhaler Take 1 Puff by inhalation every twelve (12) hours. 3 Inhaler 1  
 fenofibrate nanocrystallized (TRICOR) 48 mg tablet Take 1 Tab by mouth daily. 90 Tab 1  
 SITagliptin (JANUVIA) 100 mg tablet Take 1 Tab by mouth daily.  90 Tab 1  
  omeprazole (PRILOSEC) 40 mg capsule Take 1 Cap by mouth daily. 90 Cap 1  
 PARoxetine (PAXIL) 40 mg tablet Take 1 Tab by mouth daily. 90 Tab 1  
 levothyroxine (SYNTHROID) 50 mcg tablet Take 1 Tab by mouth Daily (before breakfast). 90 Tab 1  propranolol LA (INDERAL LA) 60 mg SR capsule Take 1 Cap by mouth daily. Indications: migraine prevention 90 Cap 1  
 albuterol (PROVENTIL HFA, VENTOLIN HFA, PROAIR HFA) 90 mcg/actuation inhaler Take 2 Puffs by inhalation every four (4) hours as needed for Wheezing. 2 Inhaler 0  
 metFORMIN (GLUCOPHAGE) 1,000 mg tablet Take 1 Tab by mouth two (2) times daily (with meals). 180 Tab 0  chlorthalidone (HYGROTEN) 50 mg tablet Take 1 Tab by mouth every morning. 90 Tab 0  
 docusate sodium (COLACE) 100 mg capsule Take 1 Cap by mouth two (2) times a day. 180 Cap 1  
 atorvastatin (LIPITOR) 10 mg tablet Take 1 Tab by mouth daily. 90 Tab 1  polyethylene glycol (MIRALAX) 17 gram packet Take 1 Packet by mouth daily. 90 Packet 2  
 alendronate (FOSAMAX) 70 mg tablet Take 1 Tab by mouth every seven (7) days. 12 Tab 2  
 fluticasone propionate (FLONASE) 50 mcg/actuation nasal spray 2 Sprays by Both Nostrils route daily. 1 Bottle 2  
 cetirizine (ZYRTEC) 10 mg tablet Take 1 Tab by mouth daily. 90 Tab 0  
 garlic 4,170 mg cap Take  by mouth.  ginger, Zingiber officinalis, (GINGER EXTRACT) 250 mg cap Take  by mouth.  Omega-3-DHA-EPA-Fish Oil 1,000 mg (120 mg-180 mg) cap Take  by mouth daily.  [DISCONTINUED] hydrocortisone (ANUSOL-HC) 2.5 % rectal cream Insert  into rectum four (4) times daily. 30 g 2  
 [DISCONTINUED] phenylephrine 0.25 % suppository Insert 1 Suppository into rectum two (2) times a day. 60 Suppository 2 No current facility-administered medications on file prior to visit. Allergies Allergen Reactions  Codeine Nausea and Vomiting Health Maintenance:  
Health Maintenance Topic Date Due  
  Shingrix Vaccine Age 50> (1 of 2) 04/05/2001  Pneumococcal 65+ years (2 of 2 - PPSV23) 01/15/2020  GLAUCOMA SCREENING Q2Y  04/17/2020  Eye Exam Retinal or Dilated  04/17/2020  
 DTaP/Tdap/Td series (2 - Td) 08/11/2020  Flu Vaccine (1) 09/01/2020  Foot Exam Q1  09/12/2020  Medicare Yearly Exam  01/16/2021  MICROALBUMIN Q1  01/16/2021  Lipid Screen  01/16/2021  A1C test (Diabetic or Prediabetic)  06/02/2021  Colonoscopy  12/21/2021  Breast Cancer Screen Mammogram  02/04/2022  Hepatitis C Screening  Completed  Bone Densitometry (Dexa) Screening  Completed Objective: 
Visit Vitals /74 Pulse 70 Temp 97.5 °F (36.4 °C) (Oral) Resp 16 Ht 5' 1\" (1.549 m) Wt 140 lb (63.5 kg) SpO2 98% BMI 26.45 kg/m² Nurses notes and VS reviewed. Physical Examination: General appearance - alert, well appearing, and in no distress Chest - mild wheezes, no crackles Heart - normal rate and regular rhythm, + murmur Extremities - pedal edema 1-2 + Labwork and Ancillary Studies: CBC w/Diff Lab Results Component Value Date/Time WBC 9.0 06/11/2020 02:18 PM  
 HGB 12.4 06/11/2020 02:18 PM  
 PLATELET 397 21/89/7598 02:18 PM  
  
 
 Basic Metabolic Profile Lab Results Component Value Date/Time Sodium 140 03/04/2020 11:40 AM  
 Potassium 3.7 03/04/2020 11:40 AM  
 Chloride 107 03/04/2020 11:40 AM  
 CO2 28 03/04/2020 11:40 AM  
 Anion gap 5 03/04/2020 11:40 AM  
 Glucose 182 (H) 03/04/2020 11:40 AM  
 BUN 9 03/04/2020 11:40 AM  
 Creatinine 0.66 03/04/2020 11:40 AM  
 BUN/Creatinine ratio 14 03/04/2020 11:40 AM  
 GFR est AA >60 03/04/2020 11:40 AM  
 GFR est non-AA >60 03/04/2020 11:40 AM  
 Calcium 8.6 03/04/2020 11:40 AM  
  
  
LFT Lab Results Component Value Date/Time ALT (SGPT) 19 01/16/2020 01:55 PM  
 Alk.  phosphatase 58 01/16/2020 01:55 PM  
 Bilirubin, direct 0.2 01/16/2020 01:55 PM  
 Bilirubin, total 0.6 01/16/2020 01:55 PM  
 
 Cholesterol Lab Results Component Value Date/Time  Cholesterol, total 150 01/16/2020 01:55 PM  
 HDL Cholesterol 39 (L) 01/16/2020 01:55 PM  
 LDL, calculated 82.4 01/16/2020 01:55 PM  
 Triglyceride 143 01/16/2020 01:55 PM  
 CHOL/HDL Ratio 3.8 01/16/2020 01:55 PM

## 2020-09-02 NOTE — PATIENT INSTRUCTIONS
Hemorrhoids: Care Instructions Your Care Instructions Hemorrhoids are enlarged veins that develop in the anal canal. Bleeding during bowel movements, itching, swelling, and rectal pain are the most common symptoms. They can be uncomfortable at times, but hemorrhoids rarely are a serious problem. You can treat most hemorrhoids with simple changes to your diet and bowel habits. These changes include eating more fiber and not straining to pass stools. Most hemorrhoids do not need surgery or other treatment unless they are very large and painful or bleed a lot. Follow-up care is a key part of your treatment and safety. Be sure to make and go to all appointments, and call your doctor if you are having problems. It's also a good idea to know your test results and keep a list of the medicines you take. How can you care for yourself at home? · Sit in a few inches of warm water (sitz bath) 3 times a day and after bowel movements. The warm water helps with pain and itching. · Put ice on your anal area several times a day for 10 minutes at a time. Put a thin cloth between the ice and your skin. Follow this by placing a warm, wet towel on the area for another 10 to 20 minutes. · Take pain medicines exactly as directed. ? If the doctor gave you a prescription medicine for pain, take it as prescribed. ? If you are not taking a prescription pain medicine, ask your doctor if you can take an over-the-counter medicine. · Keep the anal area clean, but be gentle. Use water and a fragrance-free soap, such as [place], or use baby wipes or medicated pads, such as Tucks. · Wear cotton underwear and loose clothing to decrease moisture in the anal area. · Eat more fiber. Include foods such as whole-grain breads and cereals, raw vegetables, raw and dried fruits, and beans. · Drink plenty of fluids, enough so that your urine is light yellow or clear like water.  If you have kidney, heart, or liver disease and have to limit fluids, talk with your doctor before you increase the amount of fluids you drink. · Use a stool softener that contains bran or psyllium. You can save money by buying bran or psyllium (available in bulk at most health food stores) and sprinkling it on foods or stirring it into fruit juice. Or you can use a product such as Metamucil or Hydrocil. · Practice healthy bowel habits. ? Go to the bathroom as soon as you have the urge. ? Avoid straining to pass stools. Relax and give yourself time to let things happen naturally. ? Do not hold your breath while passing stools. ? Do not read while sitting on the toilet. Get off the toilet as soon as you have finished. · Take your medicines exactly as prescribed. Call your doctor if you think you are having a problem with your medicine. When should you call for help? [id number] anytime you think you may need emergency care. For example, call if: 
· You pass maroon or very bloody stools. Call your doctor now or seek immediate medical care if: 
· You have increased pain. · You have increased bleeding. Watch closely for changes in your health, and be sure to contact your doctor if: 
· Your symptoms have not improved after 3 or 4 days. Where can you learn more? Go to http://bertha-shayla.info/ Enter F228 in the search box to learn more about \"Hemorrhoids: Care Instructions. \" Current as of: August 12, 2019               Content Version: 12.5 © 5630-8833 [employer]. Care instructions adapted under license by [employer] (which disclaims liability or warranty for this information). If you have questions about a medical condition or this instruction, always ask your healthcare professional. [place] [address] any warranty or liability for your use of this information.

## 2020-09-25 NOTE — TELEPHONE ENCOUNTER
Patient left voicemail at nurses station with concerns of hallucination . I spoke with  who says its been going on the last 3 days . she is in [place] with daughter also says she is getting forgetful lately and speaks mean at times  . He will call his daughter and have patient call me. I left her a message but cell phone went straight to voicemail . Patient called and I also spoke with her and daughter, she says patient woke her up screaming that she saw someone on the roof . I asked patient if she was having any frequent urination / burning or lower abdominal discomfort . She is up peeing 3 times a night but no other symptoms . I told patient that she should go to urgent care and be evaluated for a UTI . In older patients confusion/ hallucination  can be a sign of an infection . I also scheduled her for a memory testing appointment on 9/30/20 for 2:30 pm. I told her to bring her  so he can be a second pair of ears and help her with any following testing or specialist visit . I called and left appointment details on 's voicemail and asked that he come with her .

## 2020-09-30 NOTE — PATIENT INSTRUCTIONS

## 2020-09-30 NOTE — PROGRESS NOTES
Shruthi Cronin is a 72 y.o. female (: 1950) presenting to address: Chief Complaint Patient presents with  Memory Loss Vitals:  
 20 1433 BP: (!) 147/70 Pulse: 100 Resp: 16 Temp: 97.3 °F (36.3 °C) TempSrc: Oral  
SpO2: 98% Weight: 137 lb (62.1 kg) Height: 5' 1\" (1.549 m) PainSc:   0 - No pain Hearing/Vision: No exam data present Learning Assessment:  
 
Learning Assessment 2016 PRIMARY LEARNER Patient HIGHEST LEVEL OF EDUCATION - PRIMARY LEARNER  SOME COLLEGE PRIMARY LANGUAGE ENGLISH  
LEARNER PREFERENCE PRIMARY DEMONSTRATION  
ANSWERED BY patient RELATIONSHIP SELF Depression Screening:  
 
3 most recent PHQ Screens 1/15/2019 PHQ Not Done - Little interest or pleasure in doing things Not at all Feeling down, depressed, irritable, or hopeless Not at all Total Score PHQ 2 0 Fall Risk Assessment:  
 
Fall Risk Assessment, last 12 mths 2020 Able to walk? Yes Fall in past 12 months? Yes Fall with injury? Yes  
Number of falls in past 12 months 1 Fall Risk Score 2 Abuse Screening:  
 
Abuse Screening Questionnaire 1/15/2019 Do you ever feel afraid of your partner? Genoveva Juarez Are you in a relationship with someone who physically or mentally threatens you? Genoveva Juarez Is it safe for you to go home? Kevin Patten Coordination of Care Questionaire: 1. Have you been to the ER, urgent care clinic since your last visit? Hospitalized since your last visit? NO 
 
2. Have you seen or consulted any other health care providers outside of the [address] [place] [address] since your last visit? Include any pap smears or colon screening. NO Advanced Directive: 1. Do you have an Advanced Directive? NO 
 
2. Would you like information on Advanced Directives? NO Flu shot Immunization/s administered 2020 by Charline Wu LPN with guardian's consent. Patient tolerated procedure well. No reactions noted.

## 2020-09-30 NOTE — PROGRESS NOTES
Assessment/Plan: *Diagnoses and all orders for this visit: 
 
1. Memory difficulties 
-     REFERRAL TO NEUROLOGY 2. Hallucinations 
-     REFERRAL TO PSYCHIATRY 3. Needs flu shot 
-     FLU (FLUAD QUAD INFLUENZA VACCINE,QUAD,ADJUVANTED) We went through her medication list and discontinued several of them. I reminded her of the importance of working on her diet so we can better control her HTN and DM. With this, I can hopefully reduce more of her meds. She scored a 24/30 on her [facility] test.  Will refer her to Neuro. F/u in 2 months. .  Will discuss visits to specialists and repeat A1c. Spent 45 mins with pt and her . > 50% of the visit was spent on counseling the pt. Answered all their questions. He has agreed to help manage things at home, go through her meds. Will try to make it to appts with her. He still works. The plan was discussed with the patient. The patient verbalized understanding and is in agreement with the plan. All medication potential side effects were discussed with the patient. 
 
------------------------------------------------------------------------------------------------------------------- Antonio Fuller is a 72 y.o. female and presents with Memory Loss Subjective: 
Pt here with her . There has been a increasing concern regarding her memory. He has noticed this over the last 6-8 months. .  In the last 2 months, this has gotten even worse and while visiting her daughter recently, pt began hallucinating about seeing people in the hallway and hearing someone on the roof.  has also noticed her to have mood swings, one minute she may be happy and the next minute she is irritable and yelling. Review of Systems - General ROS: negative The problem list was updated as a part of today's visit. Patient Active Problem List  
Diagnosis Code  Hypertension I10  
 Diabetes ([facility] [address] E11.9  GERD (gastroesophageal reflux disease) K21.9  Depression F32.9  
 HLD (hyperlipidemia) E78.5  Acquired hypothyroidism E03.9  Benign positional vertigo H81.10  Asthma J45.909  Sleep disturbance G47.9  Type 2 diabetes mellitus with diabetic neuropathy (HCC) E11.40 The PSH, FH were reviewed. SH: Social History Tobacco Use  Smoking status: Never Smoker  Smokeless tobacco: Never Used Substance Use Topics  Alcohol use: Yes  Drug use: No  
 
 
Medications/Allergies: 
Current Outpatient Medications on File Prior to Visit Medication Sig Dispense Refill  lisinopriL (PRINIVIL, ZESTRIL) 40 mg tablet Take 1 Tab by mouth daily. 90 Tab 1  
 gabapentin (NEURONTIN) 100 mg capsule Take 1 Cap by mouth three (3) times daily. 270 Cap 1  potassium chloride (KLOR-CON) 10 mEq tablet Take 2 Tabs by mouth daily. 180 Tab 1  
 meclizine (ANTIVERT) 25 mg tablet Take 1 Tab by mouth three (3) times daily as needed for Dizziness. Indications: sensation of spinning or whirling 60 Tab 1  
 glipiZIDE (GLUCOTROL) 5 mg tablet Take 1 Tab by mouth two (2) times a day. 180 Tab 1  
 fluticasone propion-salmeteroL (ADVAIR/WIXELA) 250-50 mcg/dose diskus inhaler Take 1 Puff by inhalation every twelve (12) hours. 3 Inhaler 1  
 SITagliptin (JANUVIA) 100 mg tablet Take 1 Tab by mouth daily. 90 Tab 1  
 PARoxetine (PAXIL) 40 mg tablet Take 1 Tab by mouth daily. 90 Tab 1  
 levothyroxine (SYNTHROID) 50 mcg tablet Take 1 Tab by mouth Daily (before breakfast). 90 Tab 1  propranolol LA (INDERAL LA) 60 mg SR capsule Take 1 Cap by mouth daily. Indications: migraine prevention 90 Cap 1  
 albuterol (PROVENTIL HFA, VENTOLIN HFA, PROAIR HFA) 90 mcg/actuation inhaler Take 2 Puffs by inhalation every four (4) hours as needed for Wheezing. 2 Inhaler 0  
 metFORMIN (GLUCOPHAGE) 1,000 mg tablet Take 1 Tab by mouth two (2) times daily (with meals).  180 Tab 0  
  chlorthalidone (HYGROTEN) 50 mg tablet Take 1 Tab by mouth every morning. 90 Tab 0  
 atorvastatin (LIPITOR) 10 mg tablet Take 1 Tab by mouth daily. 90 Tab 1  polyethylene glycol (MIRALAX) 17 gram packet Take 1 Packet by mouth daily. 90 Packet 2  
 alendronate (FOSAMAX) 70 mg tablet Take 1 Tab by mouth every seven (7) days. 12 Tab 2  
 [DISCONTINUED] phenylephrine 0.25 % suppository Insert 1 Suppository into rectum two (2) times a day. 60 Suppository 2  
 [DISCONTINUED] hydrocortisone (ANUSOL-HC) 2.5 % rectal cream Insert  into rectum four (4) times daily. 30 g 2  
 [DISCONTINUED] promethazine (PHENERGAN) 25 mg tablet Take 1 Tab by mouth every eight (8) hours as needed for Nausea. 90 Tab 1  [DISCONTINUED] naproxen (NAPROSYN) 500 mg tablet Take 1 Tab by mouth two (2) times daily as needed for Pain. 40 Tab 0  [DISCONTINUED] fenofibrate nanocrystallized (TRICOR) 48 mg tablet Take 1 Tab by mouth daily. 90 Tab 1  [DISCONTINUED] omeprazole (PRILOSEC) 40 mg capsule Take 1 Cap by mouth daily. 90 Cap 1  
 [DISCONTINUED] docusate sodium (COLACE) 100 mg capsule Take 1 Cap by mouth two (2) times a day. 180 Cap 1  
 [DISCONTINUED] fluticasone propionate (FLONASE) 50 mcg/actuation nasal spray 2 Sprays by Both Nostrils route daily. 1 Bottle 2  
 [DISCONTINUED] cetirizine (ZYRTEC) 10 mg tablet Take 1 Tab by mouth daily. 90 Tab 0  [DISCONTINUED] garlic 1,334 mg cap Take  by mouth.  [DISCONTINUED] ginger, Zingiber officinalis, (GINGER EXTRACT) 250 mg cap Take  by mouth.  [DISCONTINUED] Omega-3-DHA-EPA-Fish Oil 1,000 mg (120 mg-180 mg) cap Take  by mouth daily. No current facility-administered medications on file prior to visit. Allergies Allergen Reactions  Codeine Nausea and Vomiting Health Maintenance:  
Health Maintenance Topic Date Due  Shingrix Vaccine Age 50> (1 of 2) 04/05/2001  Pneumococcal 65+ years (2 of 2 - PPSV23) 01/15/2020  GLAUCOMA SCREENING Q2Y  04/17/2020  Eye Exam Retinal or Dilated  04/17/2020  
 DTaP/Tdap/Td series (2 - Td) 08/11/2020  Flu Vaccine (1) 09/01/2020  Foot Exam Q1  09/12/2020  Medicare Yearly Exam  01/16/2021  MICROALBUMIN Q1  01/16/2021  Lipid Screen  01/16/2021  A1C test (Diabetic or Prediabetic)  06/02/2021  Colonoscopy  12/21/2021  Breast Cancer Screen Mammogram  02/04/2022  Hepatitis C Screening  Completed  Bone Densitometry (Dexa) Screening  Completed Objective: 
Visit Vitals BP (!) 147/70 Pulse 100 Temp 97.3 °F (36.3 °C) (Oral) Resp 16 Ht 5' 1\" (1.549 m) Wt 137 lb (62.1 kg) SpO2 98% BMI 25.89 kg/m² Nurses notes and VS reviewed. Physical Examination: General appearance - alert, well appearing, and in no distress Labwork and Ancillary Studies: CBC w/Diff Lab Results Component Value Date/Time WBC 9.0 06/11/2020 02:18 PM  
 HGB 12.4 06/11/2020 02:18 PM  
 PLATELET 479 30/37/6805 02:18 PM  
  
 
 Basic Metabolic Profile Lab Results Component Value Date/Time Sodium 140 03/04/2020 11:40 AM  
 Potassium 3.7 03/04/2020 11:40 AM  
 Chloride 107 03/04/2020 11:40 AM  
 CO2 28 03/04/2020 11:40 AM  
 Anion gap 5 03/04/2020 11:40 AM  
 Glucose 182 (H) 03/04/2020 11:40 AM  
 BUN 9 03/04/2020 11:40 AM  
 Creatinine 0.66 03/04/2020 11:40 AM  
 BUN/Creatinine ratio 14 03/04/2020 11:40 AM  
 GFR est AA >60 03/04/2020 11:40 AM  
 GFR est non-AA >60 03/04/2020 11:40 AM  
 Calcium 8.6 03/04/2020 11:40 AM  
  
  
LFT Lab Results Component Value Date/Time ALT (SGPT) 19 01/16/2020 01:55 PM  
 Alk. phosphatase 58 01/16/2020 01:55 PM  
 Bilirubin, direct 0.2 01/16/2020 01:55 PM  
 Bilirubin, total 0.6 01/16/2020 01:55 PM  
 
 
 
Cholesterol Lab Results Component Value Date/Time  Cholesterol, total 150 01/16/2020 01:55 PM  
 HDL Cholesterol 39 (L) 01/16/2020 01:55 PM  
 LDL, calculated 82.4 01/16/2020 01:55 PM  
 Triglyceride 143 01/16/2020 01:55 PM  
 CHOL/HDL Ratio 3.8 01/16/2020 01:55 PM

## 2020-10-20 NOTE — TELEPHONE ENCOUNTER
Requested Prescriptions     Pending Prescriptions Disp Refills    metFORMIN (GLUCOPHAGE) 1,000 mg tablet 180 Tab 0     Sig: Take 1 Tab by mouth two (2) times daily (with meals).

## 2020-10-21 NOTE — TELEPHONE ENCOUNTER
Pt is currently out of her metformin and she would like to know if a 10day supply could be sent to [address], [address] until she is able to  her medication from [place]

## 2020-10-29 NOTE — TELEPHONE ENCOUNTER
[place] called [place] wont be ready for another week. Pt states that she is unable to wait that long and her supply from [facility] is about to run out. Patient would like to know if she is able to get another supply from [facility] [place] [facility] [place], please advise. Pt would like a returned call from the nurse if/when the medication is ready.

## 2020-11-16 NOTE — TELEPHONE ENCOUNTER
Requested Prescriptions     Pending Prescriptions Disp Refills    PARoxetine (PAXIL) 40 mg tablet 90 Tab 1     Sig: Take 1 Tab by mouth daily.  SITagliptin (JANUVIA) 100 mg tablet 90 Tab 1     Sig: Take 1 Tab by mouth daily.  lisinopriL (PRINIVIL, ZESTRIL) 40 mg tablet 90 Tab 1     Sig: Take 1 Tab by mouth daily. Pt called to request a refill. Pt is requesting to be called once the medication has been sent over since it will be going to [facility]. No future appointments.

## 2020-12-11 NOTE — PROGRESS NOTES
Adrianna Correa is a 72 y.o. female (: 1950) presenting to address: Chief Complaint Patient presents with  Leg Swelling  
  better today  Breathing Problem  
  asthma  Rectal Bleeding  
  ran out of suppositories and cream   
 
 
Vitals:  
 20 1106 BP: 182/74 Pulse: 70 Resp: 16 Temp: 97.5 °F (36.4 °C) TempSrc: Oral  
SpO2: 98% Weight: 140 lb (63.5 kg) Height: 5' 1\" (1.549 m) PainSc:   0 - No pain Hearing/Vision: No exam data present Learning Assessment:  
 
Learning Assessment 2016 PRIMARY LEARNER Patient HIGHEST LEVEL OF EDUCATION - PRIMARY LEARNER  SOME COLLEGE PRIMARY LANGUAGE ENGLISH  
LEARNER PREFERENCE PRIMARY DEMONSTRATION  
ANSWERED BY patient RELATIONSHIP SELF Depression Screening:  
 
3 most recent PHQ Screens 1/15/2019 PHQ Not Done - Little interest or pleasure in doing things Not at all Feeling down, depressed, irritable, or hopeless Not at all Total Score PHQ 2 0 Fall Risk Assessment:  
 
Fall Risk Assessment, last 12 mths 2020 Able to walk? Yes Fall in past 12 months? Yes Fall with injury? Yes  
Number of falls in past 12 months 1 Fall Risk Score 2 Abuse Screening:  
 
Abuse Screening Questionnaire 1/15/2019 Do you ever feel afraid of your partner? Marcelle Jimenezi Are you in a relationship with someone who physically or mentally threatens you? Marcelle Jimenezi Is it safe for you to go home? John Paul Chan Coordination of Care Questionaire: 1. Have you been to the ER, urgent care clinic since your last visit? Hospitalized since your last visit? NO 
 
2. Have you seen or consulted any other health care providers outside of the [address] [place] [address] since your last visit? Include any pap smears or colon screening. No 
 
 
Advanced Directive: 1. Do you have an Advanced Directive? NO  
 
2. Would you like information on Advanced Directives?  NO 
 
 
 
 Chest tube with minimal drainage, still a pocket visible projecting to the left upper lobe.  Another dose of tPA/dornase has been requested.

## 2022-08-03 NOTE — TELEPHONE ENCOUNTER
From: Elke Lyon  To: Yesi Julien  Sent: 8/3/2022 12:54 PM CDT  Subject: Mucus Plug    I think I’m losing my mucus plug but I’m not 100% sure I looked it up on google and that’s what it looks like . I just wanted to let you know .   Potassium last ordered 07/09/19 qty 90 with 0 refills.      Last appt 09/12/19  Next scheduled 01/16/20

## 2023-07-11 NOTE — TELEPHONE ENCOUNTER
Requested Prescriptions     Pending Prescriptions Disp Refills    chlorthalidone (HYGROTEN) 25 mg tablet 90 Tab 0     Sig: Take 1 Tab by mouth daily.  SITagliptin (JANUVIA) 100 mg tablet 90 Tab 1     Sig: Take 1 Tab by mouth daily. Remaining pills:0  Last appt:1/15/18  Next appt:05/14/19    Pharmacy: [facility] [address]      Patient aware of 72 hour time frame. Detail Level: Generalized Detail Level: Zone Sunscreen Recommendations: Encouraged use of zinc based sunscreen, wide brimmed hats, and SPF protected clothing while in the sun, as it has been proven to prevent further photo damage from the sun. Detail Level: Simple Detail Level: Detailed

## 2023-09-01 NOTE — PROGRESS NOTES
PHYSICAL THERAPY - DAILY TREATMENT NOTE    Patient Name: Apolinar Jones        Date: 2017  : 1950   YES Patient  Verified  Visit #:     Insurance: Payor: Chiara Hilario / Plan: [place] / Product Type: Medicare /      In time: 7449 Out time: 1100   Total Treatment Time: 30     Medicare Time Tracking (below)   Total Timed Codes (min):  10 1:1 Treatment Time:  10     TREATMENT AREA =  Neck pain [M54.2]    SUBJECTIVE  Pain Level (on 0 to 10 scale):  6  / 10   Medication Changes/New allergies or changes in medical history, any new surgeries or procedures? NO    If yes, update Summary List   Subjective Functional Status/Changes:  []  No changes reported     See POC          OBJECTIVE      10 min Therapeutic Exercise:  [x]  See flow sheet   Rationale:      increase ROM and increase strength to improve the patients ability to perform ADL's.         min Patient Education:  YES  Reviewed HEP   []  Progressed/Changed HEP based on: Other Objective/Functional Measures:    See POC     Post Treatment Pain Level (on 0 to 10) scale:   6   10     ASSESSMENT  Assessment/Changes in Function:     See POC     []  See Progress Note/Recertification   Patient will continue to benefit from skilled PT services to modify and progress therapeutic interventions, address functional mobility deficits, address ROM deficits, address strength deficits, analyze and address soft tissue restrictions, analyze and cue movement patterns, analyze and modify body mechanics/ergonomics and assess and modify postural abnormalities to attain remaining goals.    Progress toward goals / Updated goals:    See POC     PLAN  [x]  Upgrade activities as tolerated YES Continue plan of care   []  Discharge due to :    []  Other:      Therapist: Rubi Enriquez, PT    Date: 2017 Time: 11:16 AM     Future Appointments  Date Time Provider Davion Waters   2017 12:00 PM Rubi Enriquez, PT [facility] 2/2/2017 12:00 PM Kole Faulkner, PT [facility]   2/7/2017 12:00 PM May Laura, PT [facility]   2/9/2017 12:00 PM May Laura, PT [facility]   2/13/2017 12:00 PM Kole Faulkner, PT [facility]   2/16/2017 12:00 PM May Laura, PT [facility]   2/21/2017 12:00 PM May Laura, PT [facility]   2/23/2017 12:00 PM May Laura, PT [facility]   3/20/2017 1:00 PM Evonne Hendricks MD [facility] [Use of Plain Language] : use of plain language [Adequate] : adequate [None] : none [] : I have reviewed management goals with caretaker and provided a copy of care plan